# Patient Record
Sex: FEMALE | Race: WHITE | Employment: UNEMPLOYED | URBAN - METROPOLITAN AREA
[De-identification: names, ages, dates, MRNs, and addresses within clinical notes are randomized per-mention and may not be internally consistent; named-entity substitution may affect disease eponyms.]

---

## 2022-03-18 PROBLEM — Z98.84 STATUS POST GASTRIC BYPASS FOR OBESITY: Status: ACTIVE | Noted: 2020-11-10

## 2022-03-19 PROBLEM — I10 ESSENTIAL HYPERTENSION: Status: ACTIVE | Noted: 2018-03-23

## 2022-06-02 ENCOUNTER — APPOINTMENT (OUTPATIENT)
Dept: ULTRASOUND IMAGING | Age: 32
End: 2022-06-02
Payer: COMMERCIAL

## 2022-06-02 ENCOUNTER — HOSPITAL ENCOUNTER (OUTPATIENT)
Age: 32
Setting detail: OBSERVATION
Discharge: HOME | End: 2022-06-03
Attending: EMERGENCY MEDICINE
Payer: COMMERCIAL

## 2022-06-02 ENCOUNTER — HOSPITAL ENCOUNTER (EMERGENCY)
Dept: CT IMAGING | Age: 32
Discharge: HOME | End: 2022-06-05
Payer: COMMERCIAL

## 2022-06-02 ENCOUNTER — HOSPITAL ENCOUNTER (EMERGENCY)
Dept: GENERAL RADIOLOGY | Age: 32
Discharge: HOME | End: 2022-06-05
Payer: COMMERCIAL

## 2022-06-02 DIAGNOSIS — R79.89 ELEVATED LFTS: ICD-10-CM

## 2022-06-02 DIAGNOSIS — R60.0 BILATERAL LOWER EXTREMITY EDEMA: ICD-10-CM

## 2022-06-02 DIAGNOSIS — E88.09 HYPOALBUMINEMIA: ICD-10-CM

## 2022-06-02 DIAGNOSIS — D64.9 ANEMIA, UNSPECIFIED TYPE: Primary | ICD-10-CM

## 2022-06-02 PROBLEM — R74.8 ELEVATED LIVER ENZYMES: Status: ACTIVE | Noted: 2022-06-02

## 2022-06-02 PROBLEM — Z98.84 STATUS POST GASTRIC BYPASS FOR OBESITY: Status: ACTIVE | Noted: 2020-11-10

## 2022-06-02 PROBLEM — I10 ESSENTIAL HYPERTENSION: Status: ACTIVE | Noted: 2018-03-23

## 2022-06-02 LAB
ALBUMIN SERPL-MCNC: 1.8 G/DL (ref 3.5–5)
ALBUMIN/GLOB SERPL: 0.5 {RATIO} (ref 1.2–3.5)
ALP SERPL-CCNC: 187 U/L (ref 50–130)
ALT SERPL-CCNC: 17 U/L (ref 12–65)
ANION GAP SERPL CALC-SCNC: 7 MMOL/L (ref 7–16)
AST SERPL-CCNC: 51 U/L (ref 15–37)
BASOPHILS # BLD: 0 K/UL (ref 0–0.2)
BASOPHILS NFR BLD: 0 % (ref 0–2)
BILIRUB SERPL-MCNC: 2.3 MG/DL (ref 0.2–1.1)
BUN SERPL-MCNC: 6 MG/DL (ref 6–23)
CALCIUM SERPL-MCNC: 7.5 MG/DL (ref 8.3–10.4)
CHLORIDE SERPL-SCNC: 110 MMOL/L (ref 98–107)
CO2 SERPL-SCNC: 26 MMOL/L (ref 21–32)
CREAT SERPL-MCNC: 0.6 MG/DL (ref 0.6–1)
DIFFERENTIAL METHOD BLD: ABNORMAL
EKG ATRIAL RATE: 95 BPM
EKG DIAGNOSIS: NORMAL
EKG P AXIS: 56 DEGREES
EKG P-R INTERVAL: 150 MS
EKG Q-T INTERVAL: 390 MS
EKG QRS DURATION: 96 MS
EKG QTC CALCULATION (BAZETT): 490 MS
EKG R AXIS: 24 DEGREES
EKG T AXIS: 37 DEGREES
EKG VENTRICULAR RATE: 95 BPM
EOSINOPHIL # BLD: 0.1 K/UL (ref 0–0.8)
EOSINOPHIL NFR BLD: 1 % (ref 0.5–7.8)
ERYTHROCYTE [DISTWIDTH] IN BLOOD BY AUTOMATED COUNT: 25.5 % (ref 11.9–14.6)
GLOBULIN SER CALC-MCNC: 3.5 G/DL (ref 2.3–3.5)
GLUCOSE SERPL-MCNC: 87 MG/DL (ref 65–100)
HCG UR QL: NEGATIVE
HCT VFR BLD AUTO: 23.6 % (ref 35.8–46.3)
HGB BLD-MCNC: 7.1 G/DL (ref 11.7–15.4)
HGB RETIC QN AUTO: 31 PG (ref 29–35)
IMM GRANULOCYTES # BLD AUTO: 0 K/UL (ref 0–0.5)
IMM GRANULOCYTES NFR BLD AUTO: 1 % (ref 0–5)
IMM RETICS NFR: 30.4 % (ref 3–15.9)
LYMPHOCYTES # BLD: 2.6 K/UL (ref 0.5–4.6)
LYMPHOCYTES NFR BLD: 40 % (ref 13–44)
MCH RBC QN AUTO: 25.2 PG (ref 26.1–32.9)
MCHC RBC AUTO-ENTMCNC: 30.1 G/DL (ref 31.4–35)
MCV RBC AUTO: 83.7 FL (ref 79.6–97.8)
MONOCYTES # BLD: 0.5 K/UL (ref 0.1–1.3)
MONOCYTES NFR BLD: 8 % (ref 4–12)
NEUTS SEG # BLD: 3.3 K/UL (ref 1.7–8.2)
NEUTS SEG NFR BLD: 51 % (ref 43–78)
NRBC # BLD: 0 K/UL (ref 0–0.2)
NT PRO BNP: 93 PG/ML (ref 5–125)
PLATELET # BLD AUTO: 196 K/UL (ref 150–450)
PMV BLD AUTO: 12.2 FL (ref 9.4–12.3)
POTASSIUM SERPL-SCNC: 4.2 MMOL/L (ref 3.5–5.1)
PROT SERPL-MCNC: 5.3 G/DL (ref 6.3–8.2)
RBC # BLD AUTO: 2.82 M/UL (ref 4.05–5.2)
RETICS # AUTO: 0.1 M/UL (ref 0.03–0.1)
RETICS/RBC NFR AUTO: 3.7 % (ref 0.3–2)
SODIUM SERPL-SCNC: 143 MMOL/L (ref 136–145)
WBC # BLD AUTO: 6.6 K/UL (ref 4.3–11.1)

## 2022-06-02 PROCEDURE — 36415 COLL VENOUS BLD VENIPUNCTURE: CPT

## 2022-06-02 PROCEDURE — 82746 ASSAY OF FOLIC ACID SERUM: CPT

## 2022-06-02 PROCEDURE — 76700 US EXAM ABDOM COMPLETE: CPT

## 2022-06-02 PROCEDURE — 83540 ASSAY OF IRON: CPT

## 2022-06-02 PROCEDURE — 84300 ASSAY OF URINE SODIUM: CPT

## 2022-06-02 PROCEDURE — 80053 COMPREHEN METABOLIC PANEL: CPT

## 2022-06-02 PROCEDURE — 84156 ASSAY OF PROTEIN URINE: CPT

## 2022-06-02 PROCEDURE — G0378 HOSPITAL OBSERVATION PER HR: HCPCS

## 2022-06-02 PROCEDURE — 82607 VITAMIN B-12: CPT

## 2022-06-02 PROCEDURE — 80143 DRUG ASSAY ACETAMINOPHEN: CPT

## 2022-06-02 PROCEDURE — 96366 THER/PROPH/DIAG IV INF ADDON: CPT

## 2022-06-02 PROCEDURE — 80074 ACUTE HEPATITIS PANEL: CPT

## 2022-06-02 PROCEDURE — 85025 COMPLETE CBC W/AUTO DIFF WBC: CPT

## 2022-06-02 PROCEDURE — 83880 ASSAY OF NATRIURETIC PEPTIDE: CPT

## 2022-06-02 PROCEDURE — 96375 TX/PRO/DX INJ NEW DRUG ADDON: CPT

## 2022-06-02 PROCEDURE — 82570 ASSAY OF URINE CREATININE: CPT

## 2022-06-02 PROCEDURE — P9047 ALBUMIN (HUMAN), 25%, 50ML: HCPCS | Performed by: NURSE PRACTITIONER

## 2022-06-02 PROCEDURE — 70450 CT HEAD/BRAIN W/O DYE: CPT

## 2022-06-02 PROCEDURE — 6370000000 HC RX 637 (ALT 250 FOR IP): Performed by: FAMILY MEDICINE

## 2022-06-02 PROCEDURE — 82728 ASSAY OF FERRITIN: CPT

## 2022-06-02 PROCEDURE — 86923 COMPATIBILITY TEST ELECTRIC: CPT

## 2022-06-02 PROCEDURE — 86900 BLOOD TYPING SEROLOGIC ABO: CPT

## 2022-06-02 PROCEDURE — 93005 ELECTROCARDIOGRAM TRACING: CPT | Performed by: EMERGENCY MEDICINE

## 2022-06-02 PROCEDURE — 71045 X-RAY EXAM CHEST 1 VIEW: CPT

## 2022-06-02 PROCEDURE — 6360000002 HC RX W HCPCS: Performed by: NURSE PRACTITIONER

## 2022-06-02 PROCEDURE — 6370000000 HC RX 637 (ALT 250 FOR IP): Performed by: NURSE PRACTITIONER

## 2022-06-02 PROCEDURE — 85046 RETICYTE/HGB CONCENTRATE: CPT

## 2022-06-02 PROCEDURE — 99285 EMERGENCY DEPT VISIT HI MDM: CPT

## 2022-06-02 PROCEDURE — 94760 N-INVAS EAR/PLS OXIMETRY 1: CPT

## 2022-06-02 PROCEDURE — 96365 THER/PROPH/DIAG IV INF INIT: CPT

## 2022-06-02 PROCEDURE — 2580000003 HC RX 258: Performed by: NURSE PRACTITIONER

## 2022-06-02 PROCEDURE — 81025 URINE PREGNANCY TEST: CPT

## 2022-06-02 RX ORDER — POLYETHYLENE GLYCOL 3350 17 G/17G
17 POWDER, FOR SOLUTION ORAL DAILY PRN
Status: DISCONTINUED | OUTPATIENT
Start: 2022-06-02 | End: 2022-06-03 | Stop reason: HOSPADM

## 2022-06-02 RX ORDER — ONDANSETRON 2 MG/ML
4 INJECTION INTRAMUSCULAR; INTRAVENOUS EVERY 6 HOURS PRN
Status: DISCONTINUED | OUTPATIENT
Start: 2022-06-02 | End: 2022-06-03 | Stop reason: HOSPADM

## 2022-06-02 RX ORDER — ACETAMINOPHEN 650 MG/1
650 SUPPOSITORY RECTAL EVERY 6 HOURS PRN
Status: DISCONTINUED | OUTPATIENT
Start: 2022-06-02 | End: 2022-06-03 | Stop reason: HOSPADM

## 2022-06-02 RX ORDER — DULOXETIN HYDROCHLORIDE 30 MG/1
30 CAPSULE, DELAYED RELEASE ORAL DAILY
Status: DISCONTINUED | OUTPATIENT
Start: 2022-06-02 | End: 2022-06-03 | Stop reason: HOSPADM

## 2022-06-02 RX ORDER — LORAZEPAM 0.5 MG/1
0.5 TABLET ORAL ONCE
Status: COMPLETED | OUTPATIENT
Start: 2022-06-02 | End: 2022-06-02

## 2022-06-02 RX ORDER — SODIUM CHLORIDE 9 MG/ML
INJECTION, SOLUTION INTRAVENOUS PRN
Status: DISCONTINUED | OUTPATIENT
Start: 2022-06-02 | End: 2022-06-03 | Stop reason: HOSPADM

## 2022-06-02 RX ORDER — LAMOTRIGINE 25 MG/1
75 TABLET ORAL DAILY
Status: DISCONTINUED | OUTPATIENT
Start: 2022-06-02 | End: 2022-06-03 | Stop reason: HOSPADM

## 2022-06-02 RX ORDER — FAMOTIDINE 20 MG/1
20 TABLET, FILM COATED ORAL 2 TIMES DAILY
Status: DISCONTINUED | OUTPATIENT
Start: 2022-06-02 | End: 2022-06-03 | Stop reason: HOSPADM

## 2022-06-02 RX ORDER — ONDANSETRON 4 MG/1
4 TABLET, ORALLY DISINTEGRATING ORAL EVERY 8 HOURS PRN
Status: DISCONTINUED | OUTPATIENT
Start: 2022-06-02 | End: 2022-06-03 | Stop reason: HOSPADM

## 2022-06-02 RX ORDER — SODIUM CHLORIDE 0.9 % (FLUSH) 0.9 %
5-40 SYRINGE (ML) INJECTION EVERY 12 HOURS SCHEDULED
Status: DISCONTINUED | OUTPATIENT
Start: 2022-06-02 | End: 2022-06-03 | Stop reason: HOSPADM

## 2022-06-02 RX ORDER — 0.9 % SODIUM CHLORIDE 0.9 %
1000 INTRAVENOUS SOLUTION INTRAVENOUS ONCE
Status: DISCONTINUED | OUTPATIENT
Start: 2022-06-02 | End: 2022-06-03 | Stop reason: HOSPADM

## 2022-06-02 RX ORDER — ALBUMIN (HUMAN) 12.5 G/50ML
25 SOLUTION INTRAVENOUS EVERY 6 HOURS
Status: DISCONTINUED | OUTPATIENT
Start: 2022-06-02 | End: 2022-06-03 | Stop reason: HOSPADM

## 2022-06-02 RX ORDER — SODIUM CHLORIDE 0.9 % (FLUSH) 0.9 %
5-40 SYRINGE (ML) INJECTION PRN
Status: DISCONTINUED | OUTPATIENT
Start: 2022-06-02 | End: 2022-06-03 | Stop reason: HOSPADM

## 2022-06-02 RX ORDER — FUROSEMIDE 10 MG/ML
20 INJECTION INTRAMUSCULAR; INTRAVENOUS 2 TIMES DAILY
Status: COMPLETED | OUTPATIENT
Start: 2022-06-02 | End: 2022-06-03

## 2022-06-02 RX ORDER — ACETAMINOPHEN 325 MG/1
650 TABLET ORAL EVERY 6 HOURS PRN
Status: DISCONTINUED | OUTPATIENT
Start: 2022-06-02 | End: 2022-06-03 | Stop reason: HOSPADM

## 2022-06-02 RX ORDER — ENOXAPARIN SODIUM 100 MG/ML
40 INJECTION SUBCUTANEOUS DAILY
Status: DISCONTINUED | OUTPATIENT
Start: 2022-06-03 | End: 2022-06-03 | Stop reason: HOSPADM

## 2022-06-02 RX ADMIN — DULOXETINE HYDROCHLORIDE 30 MG: 30 CAPSULE, DELAYED RELEASE ORAL at 20:17

## 2022-06-02 RX ADMIN — FAMOTIDINE 20 MG: 20 TABLET, FILM COATED ORAL at 20:17

## 2022-06-02 RX ADMIN — ALBUMIN (HUMAN) 25 G: 0.25 INJECTION, SOLUTION INTRAVENOUS at 16:49

## 2022-06-02 RX ADMIN — SODIUM CHLORIDE, PRESERVATIVE FREE 10 ML: 5 INJECTION INTRAVENOUS at 20:18

## 2022-06-02 RX ADMIN — LORAZEPAM 0.5 MG: 0.5 TABLET ORAL at 20:18

## 2022-06-02 RX ADMIN — LAMOTRIGINE 75 MG: 25 TABLET ORAL at 20:17

## 2022-06-02 RX ADMIN — ALBUMIN (HUMAN) 25 G: 0.25 INJECTION, SOLUTION INTRAVENOUS at 23:12

## 2022-06-02 RX ADMIN — METOPROLOL TARTRATE 25 MG: 25 TABLET, FILM COATED ORAL at 20:18

## 2022-06-02 RX ADMIN — FUROSEMIDE 20 MG: 10 INJECTION, SOLUTION INTRAMUSCULAR; INTRAVENOUS at 17:42

## 2022-06-02 ASSESSMENT — ENCOUNTER SYMPTOMS
ABDOMINAL DISTENTION: 1
SHORTNESS OF BREATH: 1

## 2022-06-02 NOTE — CONSULTS
[facility] Consult Note       Referring Physician:  Jelly Lazcano    Consult Date:  6/2/2022    Admit Date:  6/2/2022    Chief Complaint:  Elevated liver enzymes    Subjective:     History of Present Illness:  Patient is a 36 y.o. female, new to [facility], with a remote Vanessa-en-Y bypass who is seen in consultation at the request of Naresh Ramon for elevated liver enzymes. She was admitted for BLE edema, fatigue, anemia, low albumin. She reports elevated liver enzymes 2 years ago that were blamed on acetaminophen overuse for tooth pain. She and her mom report that they do not know if enzymes returned to normal.    She endorses chronic, unchanged abdominal pain since age 17. She has 3 professionally done tattoos. She denies frequent NSAID/acetaminophen use, melena, rectal bleed, N/V, alcohol use, history of IVDA or nasal cocaine, blood transfusion. PMH:  Past Medical History:   Diagnosis Date    Anxiety     Bilateral leg edema 2020    if she doesn't eat enough protein    Hypertension 02/09/2021    Interstitial cystitis 2017       PSH:  Past Surgical History:   Procedure Laterality Date    CHOLECYSTECTOMY  2012    GASTRIC BYPASS SURGERY  2010    was 283 lbs       Allergies:  No Known Allergies    Home Medications:  Prior to Admission medications    Medication Sig Start Date End Date Taking? Authorizing Provider   DULoxetine (CYMBALTA) 30 MG extended release capsule Take 30 mg by mouth daily 3/1/22   Ar Automatic Reconciliation   HYDROcodone-acetaminophen (NORCO) 5-325 MG per tablet Take 1 tablet by mouth every 4 hours as needed.  12/4/21   Ar Automatic Reconciliation   ibuprofen (ADVIL;MOTRIN) 600 MG tablet Take 1 tablet by mouth every 6 hours as needed 12/1/21   Ar Automatic Reconciliation   lamoTRIgine (LAMICTAL) 25 MG tablet Take 75 mg by mouth daily 3/1/22   Ar Automatic Reconciliation   metoprolol tartrate (LOPRESSOR) 25 MG tablet Take 25 mg by mouth 2 times daily 11/3/21   Ar Automatic Reconciliation       [facility] Medications:  Current Facility-Administered Medications   Medication Dose Route Frequency    0.9 % sodium chloride bolus  1,000 mL IntraVENous Once    albumin human 25 % IV solution 25 g  25 g IntraVENous Q6H    furosemide (LASIX) injection 20 mg  20 mg IntraVENous BID    sodium chloride flush 0.9 % injection 5-40 mL  5-40 mL IntraVENous 2 times per day    sodium chloride flush 0.9 % injection 5-40 mL  5-40 mL IntraVENous PRN    0.9 % sodium chloride infusion   IntraVENous PRN    [START ON 6/3/2022] enoxaparin (LOVENOX) injection 40 mg  40 mg SubCUTAneous Daily    ondansetron (ZOFRAN-ODT) disintegrating tablet 4 mg  4 mg Oral Q8H PRN    Or    ondansetron (ZOFRAN) injection 4 mg  4 mg IntraVENous Q6H PRN    polyethylene glycol (GLYCOLAX) packet 17 g  17 g Oral Daily PRN    acetaminophen (TYLENOL) tablet 650 mg  650 mg Oral Q6H PRN    Or    acetaminophen (TYLENOL) suppository 650 mg  650 mg Rectal Q6H PRN    famotidine (PEPCID) tablet 20 mg  20 mg Oral BID       Social History:  Social History     Tobacco Use    Smoking status: Current Every Day Smoker     Packs/day: 0.50    Smokeless tobacco: Never Used    Tobacco comment: Quit smoking: started age 14   Substance Use Topics    Alcohol use: Not Currently       Family History:  Family History   Problem Relation Age of Onset    Other Father         doesn't have contact with    Ovarian Cancer Mother 39    Other Mother         fibromyalgia    Rheum Arthritis Mother        Review of Systems:  A detailed 10 system ROS is obtained, with pertinent positives as listed above. All others are negative. Diet:      Objective:     Physical Exam:  Vitals:  /88   Pulse 88   Temp 97.7 °F (36.5 °C) (Oral)   Resp 16   Ht 5' 6\" (1.676 m)   Wt 206 lb (93.4 kg)   SpO2 100%   BMI 33.25 kg/m²   Gen:  Pt is alert, cooperative, no acute distress  Skin:  Extremities and face reveal no rashes.  No palmar erythema. No telangiectasias on the chest wall. HEENT: Sclerae anicteric. Extra-occular muscles are intact. No oral ulcers. No abnormal pigmentation of the lips. The neck is supple. Nasal and lip piercings  Cardiovascular: Regular rate and rhythm. No murmurs, gallops, or rubs. Respiratory:  Comfortable breathing with no accessory muscle use. Clear breath sounds anteriorly with no wheezes, rales, or rhonchi. GI:  Abdomen nondistended, soft, and nontender. Normal active bowel sounds. No enlargement of the liver or spleen. No masses palpable. Rectal:  Deferred  Musculoskeletal:  No pitting edema of the lower legs. Extremities have good range of motion. No costovertebral tenderness. Neurological:  Gross memory appears intact. Patient is alert and oriented. Psychiatric:  Mood appears appropriate with judgement intact. Lymphatic:  No cervical or supraclavicular adenopathy. Laboratory:    Recent Labs     06/02/22  1219   WBC 6.6   HGB 7.1*   HCT 23.6*      MCV 83.7      K 4.2   *   CO2 26   BUN 6          Assessment:       Principal Problem:    Elevated LFTs  Active Problems:    Hypoalbuminemia    Anemia    Leg edema    Elevated liver enzymes    Status post gastric bypass for obesity    Essential hypertension  Resolved Problems:    * No resolved hospital problems. *     36 y.o. female, new to [facility], with a remote Vanessa-en-Y bypass who is seen in consultation at the request of Arcenio Dubose for elevated liver enzymes. She was admitted for BLE edema, fatigue, anemia, low albumin. Plan:       1. RUQ US  2. Hepatitis panel - pending  3. Iron labs, folate, B12  4. Outpatient follow up with bariatric nutrition  5. EGD/colon as outpatient if iron deficient  6.  Check prealbumin    Joe Akers MD  [facility], [place]

## 2022-06-02 NOTE — ED NOTES
Report called to [facility], RN on 3rd floor. #20 jelco via the left upper arm intact without swelling or redness.   Transported to [facility] via the wheelchair     Krunal Zapata RN  06/02/22 2936

## 2022-06-02 NOTE — ACP (ADVANCE CARE PLANNING)
[facility] Hospitalist Service  At the heart of better care     Advance Care Planning   Admit Date:  2022 12:17 PM   Name:  Leydi Mcfarlane   Age:  36 y.o. Sex:  female  :  1990   MRN:  [id number]   Room:  [address] [place] [address] is able to make her own decisions:   Yes    If pt unable to make decisions, POA/surrogate decision maker:  Kenny Orellana    Other members present in the meeting:   NA    Patient / surrogate decision-maker directed: Full code    Other ACP topics discussed, if applicable:   Blood transfusion    Patient or surrogate consented to discussion of the current conditions, workup, management plans, prognosis, and understand the risk for further deterioration. Time spent: 30 minutes in direct discussion (face to face and/or over phone).       Signed:  AMERICA Shearer CNP

## 2022-06-02 NOTE — ED TRIAGE NOTES
Pt states that she's had lower body swelling for the past several weeks. Pt states that today she had a syncopal episode and fell today. Pt hit her head on the edge of a counter when she fell. Pt denies blood thinners. Pt denies neck pain but endorses headache.

## 2022-06-02 NOTE — ED PROVIDER NOTES
[facility] Emergency Department Provider Note                     PCP:                Parker Hopkins DO               Age: 36 y.o. Sex: female           ICD-10-CM    1. Anemia, unspecified type  D64.9       2. Bilateral lower extremity edema  R60.0       3. Hypoalbuminemia  E88.09       4. Elevated LFTs  R79.89           EKG: NSR rate of 95. Normal axis. Nonspecific st changes. No stemi    MDM  Number of Diagnoses or Management Options  Anemia, unspecified type  Bilateral lower extremity edema  Elevated LFTs  Hypoalbuminemia  Diagnosis management comments: Patient's 74-year-old female with history of gastric bypass and cholecystectomy who presents with lower extremity edema and fatigue worsening over the past 2 to 3 weeks. Found to have an acute anemia with hemoglobin of 7.1. Hemoccult negative. Denies any bleeding. Does not have menstrual cycles. Also has hypoalbuminemia along with elevated liver enzymes. No longer has a gallbladder and has no abdominal pain. At this point in time hepatitis panel is pending. No evidence of heart failure with clear chest x-ray and normal BNP. Hospitalist team consulted for admission for further evaluation and management.     Risk of Complications, Morbidity, and/or Mortality  Presenting problems: moderate  Diagnostic procedures: moderate  Management options: moderate    Patient Progress  Patient progress: stable      Orders Placed This Encounter   Procedures    CT HEAD WO CONTRAST    XR CHEST PORTABLE    US ABDOMEN COMPLETE    CBC with Auto Differential    CMP    proBNP, N-TERMINAL    Hepatitis Panel, Acute    Sodium, urine, random    Protein / creatinine ratio, urine    Protein, urine, random    Transferrin Saturation    Ferritin    Folate    Vitamin B12    Reticulocytes    Acetaminophen Level    Prealbumin    Magnesium    Haptoglobin    Iron    Hemoglobin and Hematocrit    Vital signs per unit routine    Inpatient consult to GI    Inpatient consult to Dietitian    POC Pregnancy Urine Qual    POC Pregnancy Urine Qual    EKG 12 Lead    TYPE AND SCREEN    PREPARE RBC (CROSSMATCH), 1 Units    Saline lock IV    Place in Observation Service    Discharge patient        Ricardo Marie is a 36 y.o. female who presents to the Emergency Department with chief complaint of    Chief Complaint   Patient presents with    Fall    Loss of Consciousness    Groin Swelling      Patient is a 68-year-old female with history of gastric bypass surgery who presents with lower extremity edema. She says she had lower leg swelling about a year ago that resolved spontaneously after several days. She states this started a few weeks ago and has gotten to the point that her legs are very heavy and painful. Swelling has progressed to her lower abdomen. She states that she is urinating fine. Sometimes she gets short of breath going upstairs because her legs are so heavy. No chest pain or abdominal pain. No recent medication changes. History of heart failure liver renal failure. Denies possible pregnancy. Review of Systems   Constitutional:  Positive for fatigue. HENT: Negative. Respiratory:  Positive for shortness of breath. Cardiovascular:  Positive for leg swelling. Negative for chest pain and palpitations. Gastrointestinal:  Positive for abdominal distention. Genitourinary: Negative. All other systems reviewed and are negative. All other systems reviewed and are negative. [unfilled]     [unfilled]    @[email or web address]     Social Connections: Not on file        Allergies   Allergen Reactions    Lamictal [Lamotrigine] Rash        Vitals signs and nursing note reviewed. No data found. Physical Exam  Vitals and nursing note reviewed. Constitutional:       General: She is not in acute distress. Appearance: She is well-developed. She is obese. She is not ill-appearing or toxic-appearing. HENT:      Head: Normocephalic.    Eyes: Extraocular Movements: Extraocular movements intact. Comments: Conjunctival pallor   Cardiovascular:      Rate and Rhythm: Regular rhythm. Tachycardia present. Pulses: Normal pulses. Heart sounds: Normal heart sounds. Pulmonary:      Effort: Pulmonary effort is normal.      Breath sounds: Normal breath sounds. Genitourinary:     Rectum: Guaiac result negative. Musculoskeletal:         General: Swelling and tenderness present. Cervical back: Normal range of motion and neck supple. Right lower leg: Edema present. Left lower leg: Edema present. Comments: Bilateral lower extremity edema up to the pannus both legs are tight and tender. No erythema or warmth. 2+ pitting edema. Skin:     General: Skin is warm and dry. Findings: No rash. Neurological:      General: No focal deficit present. Mental Status: She is alert. Mental status is at baseline. Psychiatric:         Mood and Affect: Mood normal.         Behavior: Behavior normal.         Thought Content: Thought content normal.            Procedures    [unfilled]     US ABDOMEN COMPLETE   Final Result      1. Mild hepatosplenomegaly and probable hepatic steatosis no definite focal   liver mass identified. 2.  No biliary ductal dilatation status post cholecystectomy. CT HEAD WO CONTRAST   Final Result   No acute intracranial abnormality. XR CHEST PORTABLE   Final Result   Unremarkable portable chest radiograph. Voice dictation software was used during the making of this note. This software is not perfect and grammatical and other typographical errors may be present. This note has not been completely proofread for errors.        BENITO Haq  06/02/22 [facility] [address]  11/01/22 6786

## 2022-06-02 NOTE — H&P
Hospitalist History and Physical   Admit Date:  2022 12:17 PM   Name:  Nikkie Smith   Age:  36 y.o. Sex:  female  :  1990   MRN:  [id number]   Room:      Presenting Complaint: Fall, Loss of Consciousness, and Groin Swelling     Reason(s) for Admission: Elevated liver enzymes [R74.8]     History of Present Illness:   Nikkie Smith is a 36 y.o. female with medical history of gastric bypass, HTN who presented with  lower extremity edema and fatigue worsening over the past 2 to 3 weeks. Found to have an acute anemia with hemoglobin of 7.1. Hemoccult negative. Denies any bleeding. Does not have menstrual cycles. Also has hypoalbuminemia along with elevated liver enzymes. No longer has a gallbladder and has no abdominal pain. She denies any N/V/D, denies melena or hematemesis. She does state taking Exedrin and Tylenol \"probably more than I should\" for teeth pain. Review of Systems:  10 systems reviewed and negative except as noted in HPI. Assessment & Plan:     Principal Problem:    Elevated LFTs  Plan: liver U/S  Consult GI  Trend  Hepatitis panel pending  Tylenol level pending    Active Problems:    Hypoalbuminemia  Plan: Albumin IV Q6H  Trend  Likely absorption issue from gastric bypass      Anemia  Plan: GI consult  Anemia panel  Trend  Transfuse <7      Leg edema  Plan: as listed above  Check renal U/S and labs for concerns of nephrotic syndrome        Status post gastric bypass for obesity  Plan: noted  Nutrition consult      Essential hypertension  Plan: home meds resumed        Dispo/Discharge Plannin-2 days    Diet: ADULT DIET; Full Liquid  VTE ppx: Lovenox SQ  Code status: Full Code    Hospital Problems:  Principal Problem:    Elevated LFTs  Active Problems:    Hypoalbuminemia    Anemia    Leg edema    Elevated liver enzymes    Status post gastric bypass for obesity    Essential hypertension  Resolved Problems:    * No resolved hospital problems.  *       Past History:     Past Medical History:   Diagnosis Date    Anxiety     Bilateral leg edema 2020    if she doesn't eat enough protein    Hypertension 02/09/2021    Interstitial cystitis 2017     Past Surgical History:   Procedure Laterality Date    CHOLECYSTECTOMY  2012    GASTRIC BYPASS SURGERY  2010    was 283 lbs      No Known Allergies   Social History     Tobacco Use    Smoking status: Current Every Day Smoker     Packs/day: 0.50    Smokeless tobacco: Never Used    Tobacco comment: Quit smoking: started age 14   Substance Use Topics    Alcohol use: Not Currently      Family History   Problem Relation Age of Onset    Other Father         doesn't have contact with    Ovarian Cancer Mother 39    Other Mother         fibromyalgia    Rheum Arthritis Mother       Family history reviewed and negative except as noted above. There is no immunization history on file for this patient. Prior to Admit Medications:  Current Outpatient Medications   Medication Instructions    DULoxetine (CYMBALTA) 30 mg, Oral, DAILY    HYDROcodone-acetaminophen (NORCO) 5-325 MG per tablet 1 tablet, Oral, EVERY 4 HOURS PRN    ibuprofen (ADVIL;MOTRIN) 600 MG tablet 1 tablet, Oral, EVERY 6 HOURS PRN    lamoTRIgine (LAMICTAL) 75 mg, Oral, DAILY    metoprolol tartrate (LOPRESSOR) 25 mg, Oral, 2 TIMES DAILY         Objective:     Patient Vitals for the past 24 hrs:   Temp Pulse Resp BP SpO2   06/02/22 1515 -- (!) 133 16 (!) 144/90 93 %   06/02/22 1154 98.2 °F (36.8 °C) (!) 114 18 107/68 100 %       Oxygen Therapy  SpO2: 93 %    Estimated body mass index is 33.25 kg/m² as calculated from the following:    Height as of this encounter: 5' 6\" (1.676 m). Weight as of this encounter: 206 lb (93.4 kg). No intake or output data in the 24 hours ending 06/02/22 1526      Physical Exam:  Blood pressure (!) 144/90, pulse (!) 133, temperature 98.2 °F (36.8 °C), resp.  rate 16, height 5' 6\" (1.676 m), weight 206 lb (93.4 kg), SpO2 93 %.  General:    Well nourished. No overt distress, BMI 33.25  Head:  Normocephalic, atraumatic  Eyes:  Sclerae appear normal.  Pupils equally round. ENT:  Nares appear normal, no drainage. Moist oral mucosa  Neck:  No restricted ROM. Trachea midline   CV:   RRR. No m/r/g. No jugular venous distension. Lungs:   CTAB. No wheezing, rhonchi, or rales. Respirations even, unlabored  Abdomen: Bowel sounds present. Soft, nontender, nondistended. Extremities: No cyanosis or clubbing. 2+ edema LE bilat. Skin:     No rashes; pale coloration. Warm and dry. Neuro:  CN II-XII grossly intact. Sensation intact. A&Ox3  Psych:  Normal mood and affect.       I have reviewed ordered lab tests and independently visualized imaging below:    Last 24hr Labs:  Recent Results (from the past 24 hour(s))   EKG 12 Lead    Collection Time: 06/02/22 12:14 PM   Result Value Ref Range    Ventricular Rate 95 BPM    Atrial Rate 95 BPM    P-R Interval 150 ms    QRS Duration 96 ms    Q-T Interval 390 ms    QTc Calculation (Bazett) 490 ms    P Axis 56 degrees    R Axis 24 degrees    T Axis 37 degrees    Diagnosis Normal sinus rhythm    CBC with Auto Differential    Collection Time: 06/02/22 12:19 PM   Result Value Ref Range    WBC 6.6 4.3 - 11.1 K/uL    RBC 2.82 (L) 4.05 - 5.2 M/uL    Hemoglobin 7.1 (L) 11.7 - 15.4 g/dL    Hematocrit 23.6 (L) 35.8 - 46.3 %    MCV 83.7 79.6 - 97.8 FL    MCH 25.2 (L) 26.1 - 32.9 PG    MCHC 30.1 (L) 31.4 - 35.0 g/dL    RDW 25.5 (H) 11.9 - 14.6 %    Platelets 063 547 - 805 K/uL    MPV 12.2 9.4 - 12.3 FL    nRBC 0.00 0.0 - 0.2 K/uL    Differential Type AUTOMATED      Seg Neutrophils 51 43 - 78 %    Lymphocytes 40 13 - 44 %    Monocytes 8 4.0 - 12.0 %    Eosinophils % 1 0.5 - 7.8 %    Basophils 0 0.0 - 2.0 %    Immature Granulocytes 1 0.0 - 5.0 %    Segs Absolute 3.3 1.7 - 8.2 K/UL    Absolute Lymph # 2.6 0.5 - 4.6 K/UL    Absolute Mono # 0.5 0.1 - 1.3 K/UL    Absolute Eos # 0.1 0.0 - 0.8 K/UL    Basophils Absolute 0.0 0.0 - 0.2 K/UL    Absolute Immature Granulocyte 0.0 0.0 - 0.5 K/UL   CMP    Collection Time: 06/02/22 12:19 PM   Result Value Ref Range    Sodium 143 136 - 145 mmol/L    Potassium 4.2 3.5 - 5.1 mmol/L    Chloride 110 (H) 98 - 107 mmol/L    CO2 26 21 - 32 mmol/L    Anion Gap 7 7 - 16 mmol/L    Glucose 87 65 - 100 mg/dL    BUN 6 6 - 23 MG/DL    CREATININE 0.60 0.6 - 1.0 MG/DL    GFR African American >60 >60 ml/min/1.73m2    GFR Non- >60 >60 ml/min/1.73m2    Calcium 7.5 (L) 8.3 - 10.4 MG/DL    Total Bilirubin 2.3 (H) 0.2 - 1.1 MG/DL    ALT 17 12 - 65 U/L    AST 51 (H) 15 - 37 U/L    Alk Phosphatase 187 (H) 50 - 130 U/L    Total Protein 5.3 (L) 6.3 - 8.2 g/dL    Albumin 1.8 (L) 3.5 - 5.0 g/dL    Globulin 3.5 2.3 - 3.5 g/dL    Albumin/Globulin Ratio 0.5 (L) 1.2 - 3.5     proBNP, N-TERMINAL    Collection Time: 06/02/22 12:19 PM   Result Value Ref Range    NT Pro-BNP 93 5 - 125 PG/ML   TYPE AND SCREEN    Collection Time: 06/02/22  1:03 PM   Result Value Ref Range    Crossmatch expiration date 06/05/2022,2359     ABO/Rh AB POSITIVE     Antibody Screen NEG    POC Pregnancy Urine Qual    Collection Time: 06/02/22  2:12 PM   Result Value Ref Range    Preg Test, Ur Negative NEG         Other Studies:  CT HEAD WO CONTRAST    Result Date: 6/2/2022  EXAM: CT HEAD WITHOUT CONTRAST INDICATION: fall. COMPARISON: None. TECHNIQUE: Contiguous axial images were obtained from the skull base through the vertex without IV contrast. Radiation dose reduction techniques were used for this study. Our CT scanners use one or all of the following: Automated exposure control, adjustment of the mA and/or kV according to patient size, iterative reconstruction. FINDINGS: Normal appearance of the brain parenchyma without evidence of acute infarction, hemorrhage, or mass lesion. No hydrocephalus or midline shift. No extra-axial mass or hemorrhage. The basal cisterns are patent.  The visualized portions of the orbits are normal. The mastoid air cells and paranasal sinuses are patent. The visualized vascular structures have an unremarkable noncontrast appearance. The calvarium and soft tissues appear normal.     No acute intracranial abnormality. XR CHEST PORTABLE    Result Date: 6/2/2022  Portable chest: History: Dyspnea Comparison: None Findings: A single view of the chest was obtained at 12:36 PM. The cardiac silhouette is normal in size and configuration. The lungs and pleural spaces are clear. The pulmonary vascularity is within normal limits. Unremarkable portable chest radiograph. Echocardiogram:  No results found for this or any previous visit. Meds previously ordered:  Orders Placed This Encounter   Medications    0.9 % sodium chloride bolus    albumin human 25 % IV solution 25 g    furosemide (LASIX) injection 20 mg    sodium chloride flush 0.9 % injection 5-40 mL    sodium chloride flush 0.9 % injection 5-40 mL    0.9 % sodium chloride infusion    enoxaparin (LOVENOX) injection 40 mg     Order Specific Question:   Indication of Use     Answer:   Prophylaxis-DVT/PE    OR Linked Order Group     ondansetron (ZOFRAN-ODT) disintegrating tablet 4 mg     ondansetron (ZOFRAN) injection 4 mg    polyethylene glycol (GLYCOLAX) packet 17 g    OR Linked Order Group     acetaminophen (TYLENOL) tablet 650 mg     acetaminophen (TYLENOL) suppository 650 mg    famotidine (PEPCID) tablet 20 mg       Blood Product Consent:  I have discussed with the patient the rationale for blood component transfusion; its benefits in treating or preventing fatigue, organ damage, or death; and its risk which includes mild transfusion reactions, rare risk of blood borne infection, or more serious but rare reactions. I have discussed the alternatives to transfusion, including the risk and consequences of not receiving transfusion.  The patient had an opportunity to ask questions and had agreed to proceed with transfusion of blood components.     Signed:  AMERICA Richardson - CNP

## 2022-06-03 VITALS
DIASTOLIC BLOOD PRESSURE: 79 MMHG | BODY MASS INDEX: 33.11 KG/M2 | TEMPERATURE: 98.4 F | HEART RATE: 82 BPM | WEIGHT: 206 LBS | SYSTOLIC BLOOD PRESSURE: 123 MMHG | RESPIRATION RATE: 17 BRPM | HEIGHT: 66 IN | OXYGEN SATURATION: 100 %

## 2022-06-03 PROBLEM — E87.6 ACUTE HYPOKALEMIA: Status: ACTIVE | Noted: 2020-01-01

## 2022-06-03 PROBLEM — R17 ELEVATED BILIRUBIN: Status: ACTIVE | Noted: 2022-06-02

## 2022-06-03 LAB
ALBUMIN SERPL-MCNC: 2 G/DL (ref 3.5–5)
ALBUMIN/GLOB SERPL: 0.7 {RATIO} (ref 1.2–3.5)
ALP SERPL-CCNC: 158 U/L (ref 50–130)
ALT SERPL-CCNC: 12 U/L (ref 12–65)
ANION GAP SERPL CALC-SCNC: 11 MMOL/L (ref 7–16)
APAP SERPL-MCNC: 4 UG/ML (ref 10–30)
AST SERPL-CCNC: 17 U/L (ref 15–37)
BASOPHILS # BLD: 0 K/UL (ref 0–0.2)
BASOPHILS NFR BLD: 0 % (ref 0–2)
BILIRUB SERPL-MCNC: 2 MG/DL (ref 0.2–1.1)
BUN SERPL-MCNC: 5 MG/DL (ref 6–23)
CALCIUM SERPL-MCNC: 7.5 MG/DL (ref 8.3–10.4)
CHLORIDE SERPL-SCNC: 108 MMOL/L (ref 98–107)
CO2 SERPL-SCNC: 27 MMOL/L (ref 21–32)
CREAT SERPL-MCNC: 0.5 MG/DL (ref 0.6–1)
CREAT UR-MCNC: 73 MG/DL
DIFFERENTIAL METHOD BLD: ABNORMAL
EOSINOPHIL # BLD: 0.1 K/UL (ref 0–0.8)
EOSINOPHIL NFR BLD: 1 % (ref 0.5–7.8)
ERYTHROCYTE [DISTWIDTH] IN BLOOD BY AUTOMATED COUNT: 26 % (ref 11.9–14.6)
FERRITIN SERPL-MCNC: 12 NG/ML (ref 8–388)
FOLATE SERPL-MCNC: 12.8 NG/ML (ref 3.1–17.5)
GLOBULIN SER CALC-MCNC: 2.9 G/DL (ref 2.3–3.5)
GLUCOSE SERPL-MCNC: 79 MG/DL (ref 65–100)
HAPTOGLOB SERPL-MCNC: <8 MG/DL (ref 30–200)
HAV IGM SER QL: NONREACTIVE
HBV CORE IGM SER QL: NONREACTIVE
HBV SURFACE AG SER QL: NONREACTIVE
HCT VFR BLD AUTO: 21.1 % (ref 35.8–46.3)
HCT VFR BLD AUTO: 24.8 % (ref 35.8–46.3)
HCV AB SER QL: NONREACTIVE
HGB BLD-MCNC: 6.3 G/DL (ref 11.7–15.4)
HGB BLD-MCNC: 7.5 G/DL (ref 11.7–15.4)
HISTORY CHECK: NORMAL
IMM GRANULOCYTES # BLD AUTO: 0.1 K/UL (ref 0–0.5)
IMM GRANULOCYTES NFR BLD AUTO: 1 % (ref 0–5)
IRON SATN MFR SERPL: 15 %
IRON SERPL-MCNC: 24 UG/DL (ref 35–150)
IRON SERPL-MCNC: 41 UG/DL (ref 35–150)
LYMPHOCYTES # BLD: 2.6 K/UL (ref 0.5–4.6)
LYMPHOCYTES NFR BLD: 42 % (ref 13–44)
MAGNESIUM SERPL-MCNC: 1.9 MG/DL (ref 1.8–2.4)
MCH RBC QN AUTO: 25.4 PG (ref 26.1–32.9)
MCHC RBC AUTO-ENTMCNC: 29.9 G/DL (ref 31.4–35)
MCV RBC AUTO: 85.1 FL (ref 79.6–97.8)
MONOCYTES # BLD: 0.6 K/UL (ref 0.1–1.3)
MONOCYTES NFR BLD: 9 % (ref 4–12)
NEUTS SEG # BLD: 2.8 K/UL (ref 1.7–8.2)
NEUTS SEG NFR BLD: 47 % (ref 43–78)
NRBC # BLD: 0 K/UL (ref 0–0.2)
PLATELET # BLD AUTO: 184 K/UL (ref 150–450)
PLATELET COMMENT: ADEQUATE
PMV BLD AUTO: 11.5 FL (ref 9.4–12.3)
POTASSIUM SERPL-SCNC: 2.8 MMOL/L (ref 3.5–5.1)
PREALB SERPL-MCNC: 3.3 MG/DL (ref 18–35.7)
PROT SERPL-MCNC: 4.9 G/DL (ref 6.3–8.2)
PROT UR-MCNC: 13 MG/DL
PROT UR-MCNC: 15 MG/DL
PROT/CREAT UR-RTO: 0.2
RBC # BLD AUTO: 2.48 M/UL (ref 4.05–5.2)
RBC MORPH BLD: ABNORMAL
SODIUM SERPL-SCNC: 146 MMOL/L (ref 136–145)
SODIUM UR-SCNC: 72 MMOL/L
TIBC SERPL-MCNC: 274 UG/DL (ref 250–450)
VIT B12 SERPL-MCNC: 891 PG/ML (ref 193–986)
WBC # BLD AUTO: 6.2 K/UL (ref 4.3–11.1)
WBC MORPH BLD: ABNORMAL

## 2022-06-03 PROCEDURE — P9047 ALBUMIN (HUMAN), 25%, 50ML: HCPCS | Performed by: NURSE PRACTITIONER

## 2022-06-03 PROCEDURE — 84134 ASSAY OF PREALBUMIN: CPT

## 2022-06-03 PROCEDURE — P9016 RBC LEUKOCYTES REDUCED: HCPCS

## 2022-06-03 PROCEDURE — 83010 ASSAY OF HAPTOGLOBIN QUANT: CPT

## 2022-06-03 PROCEDURE — 36430 TRANSFUSION BLD/BLD COMPNT: CPT

## 2022-06-03 PROCEDURE — 36415 COLL VENOUS BLD VENIPUNCTURE: CPT

## 2022-06-03 PROCEDURE — 83540 ASSAY OF IRON: CPT

## 2022-06-03 PROCEDURE — 85025 COMPLETE CBC W/AUTO DIFF WBC: CPT

## 2022-06-03 PROCEDURE — 6370000000 HC RX 637 (ALT 250 FOR IP): Performed by: INTERNAL MEDICINE

## 2022-06-03 PROCEDURE — G0378 HOSPITAL OBSERVATION PER HR: HCPCS

## 2022-06-03 PROCEDURE — 6360000002 HC RX W HCPCS: Performed by: NURSE PRACTITIONER

## 2022-06-03 PROCEDURE — 96366 THER/PROPH/DIAG IV INF ADDON: CPT

## 2022-06-03 PROCEDURE — 2580000003 HC RX 258: Performed by: NURSE PRACTITIONER

## 2022-06-03 PROCEDURE — 6370000000 HC RX 637 (ALT 250 FOR IP): Performed by: NURSE PRACTITIONER

## 2022-06-03 PROCEDURE — 85018 HEMOGLOBIN: CPT

## 2022-06-03 PROCEDURE — 96376 TX/PRO/DX INJ SAME DRUG ADON: CPT

## 2022-06-03 PROCEDURE — 83735 ASSAY OF MAGNESIUM: CPT

## 2022-06-03 PROCEDURE — 80053 COMPREHEN METABOLIC PANEL: CPT

## 2022-06-03 RX ORDER — POTASSIUM BICARBONATE 25 MEQ/1
50 TABLET, EFFERVESCENT ORAL 2 TIMES DAILY
Qty: 120 TABLET | Refills: 0 | Status: SHIPPED | OUTPATIENT
Start: 2022-06-03 | End: 2022-06-22 | Stop reason: ALTCHOICE

## 2022-06-03 RX ORDER — POTASSIUM CHLORIDE 20 MEQ/1
40 TABLET, EXTENDED RELEASE ORAL
Status: DISCONTINUED | OUTPATIENT
Start: 2022-06-03 | End: 2022-06-03

## 2022-06-03 RX ORDER — SODIUM CHLORIDE 9 MG/ML
INJECTION, SOLUTION INTRAVENOUS PRN
Status: DISCONTINUED | OUTPATIENT
Start: 2022-06-03 | End: 2022-06-03 | Stop reason: HOSPADM

## 2022-06-03 RX ADMIN — POTASSIUM BICARBONATE 40 MEQ: 782 TABLET, EFFERVESCENT ORAL at 16:45

## 2022-06-03 RX ADMIN — FAMOTIDINE 20 MG: 20 TABLET, FILM COATED ORAL at 10:09

## 2022-06-03 RX ADMIN — DULOXETINE HYDROCHLORIDE 30 MG: 30 CAPSULE, DELAYED RELEASE ORAL at 10:09

## 2022-06-03 RX ADMIN — ALBUMIN (HUMAN) 25 G: 0.25 INJECTION, SOLUTION INTRAVENOUS at 11:01

## 2022-06-03 RX ADMIN — ALBUMIN (HUMAN) 25 G: 0.25 INJECTION, SOLUTION INTRAVENOUS at 16:23

## 2022-06-03 RX ADMIN — ALBUMIN (HUMAN) 25 G: 0.25 INJECTION, SOLUTION INTRAVENOUS at 05:37

## 2022-06-03 RX ADMIN — SODIUM CHLORIDE, PRESERVATIVE FREE 10 ML: 5 INJECTION INTRAVENOUS at 10:13

## 2022-06-03 RX ADMIN — POTASSIUM CHLORIDE 40 MEQ: 20 TABLET, EXTENDED RELEASE ORAL at 12:16

## 2022-06-03 RX ADMIN — FUROSEMIDE 20 MG: 10 INJECTION, SOLUTION INTRAMUSCULAR; INTRAVENOUS at 10:58

## 2022-06-03 RX ADMIN — METOPROLOL TARTRATE 25 MG: 25 TABLET, FILM COATED ORAL at 10:10

## 2022-06-03 RX ADMIN — LAMOTRIGINE 75 MG: 25 TABLET ORAL at 10:09

## 2022-06-03 NOTE — PLAN OF CARE
Problem: Discharge Planning  Goal: Discharge to home or other facility with appropriate resources  Outcome: Progressing  Flowsheets (Taken 6/3/2022 5630)  Discharge to home or other facility with appropriate resources: Identify barriers to discharge with patient and caregiver     Problem: Safety - Adult  Goal: Free from fall injury  Outcome: Progressing     Problem: ABCDS Injury Assessment  Goal: Absence of physical injury  Outcome: Progressing

## 2022-06-03 NOTE — PLAN OF CARE
Problem: Discharge Planning  Goal: Discharge to home or other facility with appropriate resources  6/3/2022 1846 by Ada Valentino RN  Outcome: Completed  6/3/2022 1046 by Ada Valentino RN  Outcome: Progressing  Flowsheets (Taken 6/3/2022 0930)  Discharge to home or other facility with appropriate resources: Identify barriers to discharge with patient and caregiver     Problem: Safety - Adult  Goal: Free from fall injury  6/3/2022 1846 by Ada Valentino RN  Outcome: Completed  6/3/2022 1046 by Ada Valentino RN  Outcome: Progressing     Problem: ABCDS Injury Assessment  Goal: Absence of physical injury  6/3/2022 1846 by Ada Valentino RN  Outcome: Completed  6/3/2022 1046 by Ada Valentino RN  Outcome: Progressing

## 2022-06-03 NOTE — PROGRESS NOTES
[facility] Progress Note         Admit Date:  6/2/2022    Today's Date:  6/3/2022    CC: Elevated liver enzymes     Subjective:     Patient denies abdominal pain, nausea, or vomiting. She wants to eat. Medications:   Current Facility-Administered Medications   Medication Dose Route Frequency    0.9 % sodium chloride infusion   IntraVENous PRN    potassium chloride (KLOR-CON M) extended release tablet 40 mEq  40 mEq Oral TID WC    0.9 % sodium chloride bolus  1,000 mL IntraVENous Once    albumin human 25 % IV solution 25 g  25 g IntraVENous Q6H    sodium chloride flush 0.9 % injection 5-40 mL  5-40 mL IntraVENous 2 times per day    sodium chloride flush 0.9 % injection 5-40 mL  5-40 mL IntraVENous PRN    0.9 % sodium chloride infusion   IntraVENous PRN    enoxaparin (LOVENOX) injection 40 mg  40 mg SubCUTAneous Daily    ondansetron (ZOFRAN-ODT) disintegrating tablet 4 mg  4 mg Oral Q8H PRN    Or    ondansetron (ZOFRAN) injection 4 mg  4 mg IntraVENous Q6H PRN    polyethylene glycol (GLYCOLAX) packet 17 g  17 g Oral Daily PRN    acetaminophen (TYLENOL) tablet 650 mg  650 mg Oral Q6H PRN    Or    acetaminophen (TYLENOL) suppository 650 mg  650 mg Rectal Q6H PRN    famotidine (PEPCID) tablet 20 mg  20 mg Oral BID    DULoxetine (CYMBALTA) extended release capsule 30 mg  30 mg Oral Daily    lamoTRIgine (LAMICTAL) tablet 75 mg  75 mg Oral Daily    metoprolol tartrate (LOPRESSOR) tablet 25 mg  25 mg Oral BID       Review of Systems:  ROS was obtained, with pertinent positives as listed above. No chest pain or SOB.     Diet:  Full liquid     Objective:   Vitals:  BP (!) 115/95   Pulse 73   Temp 98.4 °F (36.9 °C) (Oral)   Resp 17   Ht 5' 6\" (1.676 m)   Wt 206 lb (93.4 kg)   SpO2 100%   BMI 33.25 kg/m²   Intake/Output:  06/03 0701 - 06/03 1900  In: 567.5 [P.O.:240]  Out: 0   06/01 1901 - 06/03 0700  In: 205 [I.V.:205]  Out: -   Exam:  General appearance: alert, cooperative, no distress, lying in bed  Lungs: clear to auscultation bilaterally anteriorly  Heart: regular rate and rhythm  Abdomen: soft, mild TTP to lower abdomen. Bowel sounds normal. No masses, no organomegaly  Extremities: extremities normal, atraumatic, no cyanosis or edema  Neuro:  alert and oriented    Data Review (Labs):    Recent Labs     06/02/22  1219 06/03/22  0439   WBC 6.6 6.2   HGB 7.1* 6.3*   HCT 23.6* 21.1*    184   MCV 83.7 85.1    146*   K 4.2 2.8*   * 108*   CO2 26 27   BUN 6 5*   MG  --  1.9   AST 51* 17   ALT 17 12     6/2/22  Hep A IgM NR   NONREACTIVE    Hep B Core Ab, IgM NR   NONREACTIVE    Hepatitis B Surface Ag NR   NONREACTIVE    Hepatitis C Ab NR   NONREACTIVE      COMPLETE ABDOMINAL SONOGRAPHY, June 2, 2022:       CLINICAL HISTORY: Abnormal liver function tests and anemia.       FINDINGS: Multiple images from real time ultrasound evaluation of the abdomen   show that the gallbladder is surgically absent.  The common bile duct is normal   in caliber at 5 mm, and no significant intrahepatic bilary ductal dilatation is   evident.  Diffuse hepatic parenchymal hyperechogenicity with sonic attenuation   is most commonly associated with fatty infiltration.  The liver is mildly   enlarged at 21 cm.  Diffuse hepatic parenchymal hyperechogenicity with sonic   attenuation is most commonly associated with fatty infiltration. No focal liver   lesion is seen.  The spleen is mildly enlarged at 14.2 cm.  The pancreas,   kidneys, aorta, and IVC appear unremarkable as imaged.           Impression       1.  Mild hepatosplenomegaly and probable hepatic steatosis no definite focal   liver mass identified.    2.  No biliary ductal dilatation status post cholecystectomy.         EXAM: CT HEAD WITHOUT CONTRAST 6/2/22       INDICATION: fall.       COMPARISON: None.        TECHNIQUE: Contiguous axial images were obtained from the skull base through the   vertex without IV contrast. Radiation dose reduction techniques were used for   this study. Our CT scanners use one or all of the following: Automated exposure   control, adjustment of the mA and/or kV according to patient size, iterative   reconstruction.       FINDINGS:    Normal appearance of the brain parenchyma without evidence of acute infarction,   hemorrhage, or mass lesion. No hydrocephalus or midline shift. No extra-axial   mass or hemorrhage. The basal cisterns are patent.        The visualized portions of the orbits are normal. The mastoid air cells and   paranasal sinuses are patent.       The visualized vascular structures have an unremarkable noncontrast appearance.       The calvarium and soft tissues appear normal.           Impression   No acute intracranial abnormality. Portable chest: 6/2/2       History: Dyspnea       Comparison: None       Findings: A single view of the chest was obtained at 12:36 PM.       The cardiac silhouette is normal in size and configuration. The lungs and   pleural spaces are clear. The pulmonary vascularity is within normal limits.           Impression   Unremarkable portable chest radiograph. Assessment:     Principal Problem:    Elevated bilirubin  Active Problems:    Hypoalbuminemia    Normocytic anemia    Bilateral leg edema    Acute hypokalemia    Status post gastric bypass for obesity    Essential hypertension  Resolved Problems:    * No resolved hospital problems. *    36 y.o. female, new to [facility], with a remote Vanessa-en-Y bypass who is seen in consultation at the request of Diaz Root for elevated liver enzymes. She was admitted for BLE edema, fatigue, anemia, low albumin. Abdominal US 6/2/22  1.  Mild hepatosplenomegaly and probable hepatic steatosis no definite focal   liver mass identified. 2.  No biliary ductal dilatation status post cholecystectomy. LFTs trending down, , ALT 12 (normal), AST 17 (normal), bilirubin 2.0. Acute Hepatitis panel non reactive.

## 2022-06-03 NOTE — CARE COORDINATION
Chart screened by  for discharge planning. Patient is ambulatory, independent, insured, and established with primary care. No needs identified at this time. Please consult  if any new issues arise.     Nadia Montero LMSW    [address] [facility]

## 2022-06-03 NOTE — PROGRESS NOTES
Pt is A&Ox4. Discharged at this time. H/H re-check >7.0. PIVx1 removed w tip intact. AVS printed and reviewed w patient. Paper scrips for K+ given to patient.

## 2022-06-03 NOTE — CARE COORDINATION
06/03/22 1430   Service Assessment   PCP Verified by CM Yes   Discharge Planning   Patient expects to be discharged to: [facility]   Services At/After Discharge None     Carmell Romberg, LMSW    [address] [facility]

## 2022-06-03 NOTE — DISCHARGE SUMMARY
Hospitalist Discharge Summary   Admit Date:  2022 12:17 PM   DC Note date: 6/3/2022  Name:  Shaquille Evans   Age:  36 y.o. Sex:  female  :  1990   MRN:  [id number]   Room:  [facility]  PCP:  Ranjeet Parker DO    Presenting Complaint: Fall, Loss of Consciousness, and Groin Swelling     Initial Admission Diagnosis: Hypoalbuminemia [E88.09]  Elevated LFTs [R79.89]  Elevated liver enzymes [R74.8]  Bilateral lower extremity edema [R60.0]  Anemia, unspecified type [D64.9]     Problem List for this Hospitalization (present on admission):    Principal Problem:    Elevated bilirubin  Active Problems:    Hypoalbuminemia    Normocytic anemia    Bilateral leg edema    Acute hypokalemia    Status post gastric bypass for obesity    Essential hypertension  Resolved Problems:    * No resolved hospital problems. *    Did Patient have Sepsis (YES OR NO): NO    Hospital Course:  36 y.o. female with medical history of gastric bypass, HTN who presented with  lower extremity edema and fatigue worsening over the past 2 to 3 weeks.  Found to have an acute anemia with hemoglobin of 7.1. Hemoccult negative.  Denies any bleeding.  Does not have menstrual cycles.  Also has hypoalbuminemia along with elevated liver enzymes.  No longer has a gallbladder and has no abdominal pain. She denies any N/V/D, denies melena or hematemesis. She does state taking Exedrin and Tylenol \"probably more than I should\" for teeth pain. Hgb dropped to 6.3 and she got 1U PRBC. Hgb came up to 7.5. Her liver enzymes improved. RUQ U/S showed fatty liver. Her potassium dropped to 2.8 so it was replaced. She remained stable and was discharged home with oral potassium supplementation. Disposition: HOME  Diet: ADULT DIET;  Regular  Code Status: Full Code    Follow Ups:  PCP in 3-5 days    Follow up labs/diagnostics (ultimately defer to outpatient provider):  CMP early next week by PCP    Time spent in patient discharge and coordination 33 minutes. Plan was discussed with family member, nursing, and case management. All questions answered. Patient was stable at time of discharge. Instructions given to call a physician or return if any concerns. Current Discharge Medication List      START taking these medications    Details   potassium bicarbonate (EFFER-K) 25 MEQ disintegrating tablet Take 2 tablets by mouth 2 times daily  Qty: 120 tablet, Refills: 0         CONTINUE these medications which have NOT CHANGED    Details   DULoxetine (CYMBALTA) 30 MG extended release capsule Take 30 mg by mouth daily      HYDROcodone-acetaminophen (NORCO) 5-325 MG per tablet Take 1 tablet by mouth every 4 hours as needed. ibuprofen (ADVIL;MOTRIN) 600 MG tablet Take 1 tablet by mouth every 6 hours as needed      lamoTRIgine (LAMICTAL) 25 MG tablet Take 75 mg by mouth daily      metoprolol tartrate (LOPRESSOR) 25 MG tablet Take 25 mg by mouth 2 times daily             Procedures done this admission:  * No surgery found *    Consults this admission:  IP CONSULT TO GI  IP CONSULT TO DIETITIAN    Echocardiogram results:  No results found for this or any previous visit. Diagnostic Imaging/Tests:   CT HEAD WO CONTRAST    Result Date: 6/2/2022  EXAM: CT HEAD WITHOUT CONTRAST INDICATION: fall. COMPARISON: None. TECHNIQUE: Contiguous axial images were obtained from the skull base through the vertex without IV contrast. Radiation dose reduction techniques were used for this study. Our CT scanners use one or all of the following: Automated exposure control, adjustment of the mA and/or kV according to patient size, iterative reconstruction. FINDINGS: Normal appearance of the brain parenchyma without evidence of acute infarction, hemorrhage, or mass lesion. No hydrocephalus or midline shift. No extra-axial mass or hemorrhage. The basal cisterns are patent. The visualized portions of the orbits are normal. The mastoid air cells and paranasal sinuses are patent.  The visualized vascular structures have an unremarkable noncontrast appearance. The calvarium and soft tissues appear normal.     No acute intracranial abnormality. US ABDOMEN COMPLETE    Result Date: 6/2/2022  COMPLETE ABDOMINAL SONOGRAPHY, June 2, 2022: CLINICAL HISTORY: Abnormal liver function tests and anemia. FINDINGS: Multiple images from real time ultrasound evaluation of the abdomen show that the gallbladder is surgically absent. The common bile duct is normal in caliber at 5 mm, and no significant intrahepatic bilary ductal dilatation is evident. Diffuse hepatic parenchymal hyperechogenicity with sonic attenuation is most commonly associated with fatty infiltration. The liver is mildly enlarged at 21 cm. Diffuse hepatic parenchymal hyperechogenicity with sonic attenuation is most commonly associated with fatty infiltration. No focal liver lesion is seen. The spleen is mildly enlarged at 14.2 cm. The pancreas, kidneys, aorta, and IVC appear unremarkable as imaged. 1.  Mild hepatosplenomegaly and probable hepatic steatosis no definite focal liver mass identified. 2.  No biliary ductal dilatation status post cholecystectomy. XR CHEST PORTABLE    Result Date: 6/2/2022  Portable chest: History: Dyspnea Comparison: None Findings: A single view of the chest was obtained at 12:36 PM. The cardiac silhouette is normal in size and configuration. The lungs and pleural spaces are clear. The pulmonary vascularity is within normal limits. Unremarkable portable chest radiograph.          Labs: Results:       BMP, Mg, Phos Recent Labs     06/02/22 1219 06/03/22 0439    146*   K 4.2 2.8*   * 108*   CO2 26 27   BUN 6 5*   MG  --  1.9      CBC Recent Labs     06/02/22 1219 06/03/22 0439 06/03/22  1844   WBC 6.6 6.2  --    RBC 2.82* 2.48*  --    HGB 7.1* 6.3* 7.5*   HCT 23.6* 21.1* 24.8*    184  --       LFT Recent Labs     06/02/22 1219 06/03/22 0439   ALT 17 12   GLOB 3.5 2.9 Cardiac Testing No results found for: BNP, CPK, RCK1, CKMB   Coagulation Tests No results found for: INR, APTT   A1c No results found for: HBA1C   Lipid Panel No results found for: CHOL, CHOLPOCT, CHOLX, CHLST, CHOLV, 949402, HDL, HDLC, LDL, LDLC, 346039, VLDLC, VLDL, TGLX, TRIGL   Thyroid Panel Lab Results   Component Value Date    TSH 1.840 11/03/2021    TSH 1.460 11/10/2020        Most Recent UA No results found for: MUCUS, UCOM       All Labs from Last 24 Hrs:  Recent Results (from the past 24 hour(s))   Comprehensive Metabolic Panel w/ Reflex to MG    Collection Time: 06/03/22  4:39 AM   Result Value Ref Range    Sodium 146 (H) 136 - 145 mmol/L    Potassium 2.8 (L) 3.5 - 5.1 mmol/L    Chloride 108 (H) 98 - 107 mmol/L    CO2 27 21 - 32 mmol/L    Anion Gap 11 7 - 16 mmol/L    Glucose 79 65 - 100 mg/dL    BUN 5 (L) 6 - 23 MG/DL    CREATININE 0.50 (L) 0.6 - 1.0 MG/DL    GFR African American >60 >60 ml/min/1.73m2    GFR Non- >60 >60 ml/min/1.73m2    Calcium 7.5 (L) 8.3 - 10.4 MG/DL    Total Bilirubin 2.0 (H) 0.2 - 1.1 MG/DL    ALT 12 12 - 65 U/L    AST 17 15 - 37 U/L    Alk Phosphatase 158 (H) 50 - 130 U/L    Total Protein 4.9 (L) 6.3 - 8.2 g/dL    Albumin 2.0 (L) 3.5 - 5.0 g/dL    Globulin 2.9 2.3 - 3.5 g/dL    Albumin/Globulin Ratio 0.7 (L) 1.2 - 3.5     CBC with Auto Differential    Collection Time: 06/03/22  4:39 AM   Result Value Ref Range    WBC 6.2 4.3 - 11.1 K/uL    RBC 2.48 (L) 4.05 - 5.2 M/uL    Hemoglobin 6.3 (LL) 11.7 - 15.4 g/dL    Hematocrit 21.1 (L) 35.8 - 46.3 %    MCV 85.1 79.6 - 97.8 FL    MCH 25.4 (L) 26.1 - 32.9 PG    MCHC 29.9 (L) 31.4 - 35.0 g/dL    RDW 26.0 (H) 11.9 - 14.6 %    Platelets 474 072 - 024 K/uL    MPV 11.5 9.4 - 12.3 FL    nRBC 0.00 0.0 - 0.2 K/uL    Seg Neutrophils 47 43 - 78 %    Lymphocytes 42 13 - 44 %    Monocytes 9 4.0 - 12.0 %    Eosinophils % 1 0.5 - 7.8 %    Basophils 0 0.0 - 2.0 %    Immature Granulocytes 1 0.0 - 5.0 %    Segs Absolute 2.8 1.7 - 8.2 K/UL Absolute Lymph # 2.6 0.5 - 4.6 K/UL    Absolute Mono # 0.6 0.1 - 1.3 K/UL    Absolute Eos # 0.1 0.0 - 0.8 K/UL    Basophils Absolute 0.0 0.0 - 0.2 K/UL    Absolute Immature Granulocyte 0.1 0.0 - 0.5 K/UL    RBC Comment SLIGHT  ANISOCYTOSIS        RBC Comment SLIGHT  POLYCHROMASIA        RBC Comment SLIGHT  MICROCYTOSIS        RBC Comment SLIGHT  HYPOCHROMIA        WBC Comment Result Confirmed By Smear      Platelet Comment ADEQUATE      Differential Type AUTOMATED     Prealbumin    Collection Time: 06/03/22  4:39 AM   Result Value Ref Range    Prealbumin 3.3 (L) 18.0 - 35.7 MG/DL   Magnesium    Collection Time: 06/03/22  4:39 AM   Result Value Ref Range    Magnesium 1.9 1.8 - 2.4 mg/dL   Haptoglobin    Collection Time: 06/03/22  4:39 AM   Result Value Ref Range    Haptoglobin <8 (L) 30 - 200 mg/dL   Iron    Collection Time: 06/03/22  4:39 AM   Result Value Ref Range    Iron 24 (L) 35 - 150 ug/dL   PREPARE RBC (CROSSMATCH), 1 Units    Collection Time: 06/03/22  5:30 AM   Result Value Ref Range    History Check Historical check performed    Hemoglobin and Hematocrit    Collection Time: 06/03/22  6:44 PM   Result Value Ref Range    Hemoglobin 7.5 (L) 11.7 - 15.4 g/dL    Hematocrit 24.8 (L) 35.8 - 46.3 %       No Known Allergies    There is no immunization history on file for this patient.     Recent Vital Data:  Patient Vitals for the past 24 hrs:   Temp Pulse Resp BP SpO2   06/03/22 1603 98.4 °F (36.9 °C) 82 17 123/79 100 %   06/03/22 1155 98.4 °F (36.9 °C) 73 17 (!) 115/95 100 %   06/03/22 1040 97.9 °F (36.6 °C) 92 18 (!) 134/100 93 %   06/03/22 0835 97.8 °F (36.6 °C) 85 18 122/79 99 %   06/03/22 0811 97.7 °F (36.5 °C) 86 16 108/72 100 %   06/03/22 0732 97.7 °F (36.5 °C) 80 16 107/66 99 %   06/03/22 0553 97.7 °F (36.5 °C) 83 16 115/70 99 %   06/03/22 0340 97.5 °F (36.4 °C) 85 16 108/69 98 %   06/02/22 2323 97.7 °F (36.5 °C) 78 18 91/62 100 %   06/02/22 2124 -- 100 16 -- 90 %       Oxygen Therapy  SpO2: 100 %  Pulse Oximeter Device Mode: Intermittent  O2 Device: None (Room air)    Estimated body mass index is 33.25 kg/m² as calculated from the following:    Height as of this encounter: 5' 6\" (1.676 m). Weight as of this encounter: 206 lb (93.4 kg). Intake/Output Summary (Last 24 hours) at 6/3/2022 1938  Last data filed at 6/3/2022 1846  Gross per 24 hour   Intake 1492.5 ml   Output 0 ml   Net 1492.5 ml         Physical Exam:  General:    Well nourished. No overt distress  Head:  Normocephalic, atraumatic  Eyes:  Sclerae appear normal.  Pupils equally round. HENT:  Nares appear normal, no drainage. Moist mucous membranes  Neck:  No restricted ROM. Trachea midline  CV:   RRR. No m/r/g. No JVD  Lungs:   CTAB. No wheezing, rhonchi, or rales. Even, unlabored  Abdomen:   Soft, nontender, nondistended. Extremities: Warm and dry. No cyanosis or clubbing. No edema. Skin:     No rashes. Normal coloration  Neuro:  CN II-XII grossly intact. Psych:  Normal mood and affect. Signed:  Neena Lau DO    Part of this note may have been written by using a voice dictation software. The note has been proof read but may still contain some grammatical/other typographical errors.

## 2022-06-04 LAB
ABO + RH BLD: NORMAL
BLD PROD TYP BPU: NORMAL
BLOOD BANK DISPENSE STATUS: NORMAL
BLOOD GROUP ANTIBODIES SERPL: NORMAL
BPU ID: NORMAL
CROSSMATCH RESULT: NORMAL
SPECIMEN EXP DATE BLD: NORMAL
UNIT DIVISION: 0

## 2022-06-13 ENCOUNTER — TELEMEDICINE (OUTPATIENT)
Dept: FAMILY MEDICINE CLINIC | Facility: CLINIC | Age: 32
End: 2022-06-13
Payer: COMMERCIAL

## 2022-06-13 DIAGNOSIS — D50.9 MICROCYTIC ANEMIA: ICD-10-CM

## 2022-06-13 DIAGNOSIS — E87.6 ACUTE HYPOKALEMIA: ICD-10-CM

## 2022-06-13 DIAGNOSIS — F41.9 ANXIETY AND DEPRESSION: ICD-10-CM

## 2022-06-13 DIAGNOSIS — Z98.84 STATUS POST GASTRIC BYPASS FOR OBESITY: ICD-10-CM

## 2022-06-13 DIAGNOSIS — I10 ESSENTIAL HYPERTENSION: Primary | ICD-10-CM

## 2022-06-13 DIAGNOSIS — R60.0 BILATERAL LEG EDEMA: ICD-10-CM

## 2022-06-13 DIAGNOSIS — F32.A ANXIETY AND DEPRESSION: ICD-10-CM

## 2022-06-13 DIAGNOSIS — E88.09 HYPOALBUMINEMIA: ICD-10-CM

## 2022-06-13 PROCEDURE — 99214 OFFICE O/P EST MOD 30 MIN: CPT | Performed by: FAMILY MEDICINE

## 2022-06-13 RX ORDER — DULOXETIN HYDROCHLORIDE 30 MG/1
30 CAPSULE, DELAYED RELEASE ORAL DAILY
Qty: 90 CAPSULE | Refills: 1 | Status: SHIPPED | OUTPATIENT
Start: 2022-06-13

## 2022-06-13 RX ORDER — LAMOTRIGINE 25 MG/1
75 TABLET ORAL DAILY
Qty: 270 TABLET | Refills: 1 | Status: SHIPPED
Start: 2022-06-13 | End: 2022-06-17 | Stop reason: SINTOL

## 2022-06-13 ASSESSMENT — PATIENT HEALTH QUESTIONNAIRE - PHQ9
2. FEELING DOWN, DEPRESSED OR HOPELESS: 0
SUM OF ALL RESPONSES TO PHQ9 QUESTIONS 1 & 2: 0
3. TROUBLE FALLING OR STAYING ASLEEP: 0
1. LITTLE INTEREST OR PLEASURE IN DOING THINGS: 0
9. THOUGHTS THAT YOU WOULD BE BETTER OFF DEAD, OR OF HURTING YOURSELF: 0
8. MOVING OR SPEAKING SO SLOWLY THAT OTHER PEOPLE COULD HAVE NOTICED. OR THE OPPOSITE, BEING SO FIGETY OR RESTLESS THAT YOU HAVE BEEN MOVING AROUND A LOT MORE THAN USUAL: 0
SUM OF ALL RESPONSES TO PHQ QUESTIONS 1-9: 1
5. POOR APPETITE OR OVEREATING: 0
6. FEELING BAD ABOUT YOURSELF - OR THAT YOU ARE A FAILURE OR HAVE LET YOURSELF OR YOUR FAMILY DOWN: 0
SUM OF ALL RESPONSES TO PHQ QUESTIONS 1-9: 1
4. FEELING TIRED OR HAVING LITTLE ENERGY: 1
SUM OF ALL RESPONSES TO PHQ QUESTIONS 1-9: 1
SUM OF ALL RESPONSES TO PHQ QUESTIONS 1-9: 1
7. TROUBLE CONCENTRATING ON THINGS, SUCH AS READING THE NEWSPAPER OR WATCHING TELEVISION: 0

## 2022-06-13 NOTE — PROGRESS NOTES
Puja Erazo is a 36 y.o. female who was seen by synchronous (real-time) audio-video technology on 6/13/2022. Subjective:   Puja Erazo was seen for Anxiety and Hypertension  3 mo chronic f/u    Pt started swelling again and ended up falling and hitting her head. She went to the ER on 6/2 for this. Hg was 6.3 and they gave her a blood transfusion, brought it up to 7.5. MCV normal. Iron 24  Prealbumin was 3.3  K 2.8 - was written for K supp but [employer] had to order it so hasn't been on anything. Mag normal    She will be seeing GI 7/13    She's been really tired and sleeping a lot lately    Pt hasn't had a normal period in years. But then this past Sat she had vaginal fresh blood, and then that's it. Pt feels like she can feel like something in the ovary area. Anx/dep: no rash on the lamictal. Cymbalta. Isn't sure how her mood is doing - if fatigue is from anemia or uncontrolled mood  HTN: has been out of lopressor for about 1 wk. No Known Allergies      Objective:     General: alert, cooperative, fatigued, no distress and seen w/ mom   Mental  status: mental status: alert, oriented to person, place, and time, normal mood, behavior, speech, dress, motor activity, and thought processes   Resp: No distress. Neuro: No acute deficits   Skin: skin: no discoloration or lesions of concern on visible areas     Due to this being a TeleHealth evaluation, many elements of the physical examination are unable to be assessed. Assessment & Plan:   Davian Schilling was seen today for anxiety and hypertension. Diagnoses and all orders for this visit:    Essential hypertension  -     metoprolol tartrate (LOPRESSOR) 25 MG tablet;  Take 1 tablet by mouth 2 times daily    Hypoalbuminemia  -     Comprehensive Metabolic Panel    Bilateral leg edema    Acute hypokalemia  -     Comprehensive Metabolic Panel    Status post gastric bypass for obesity  -     [address] [place] [address] Hematology & Oncology    Anxiety and depression  -     DULoxetine (CYMBALTA) 30 MG extended release capsule; Take 1 capsule by mouth daily  -     lamoTRIgine (LAMICTAL) 25 MG tablet; Take 3 tablets by mouth daily    Microcytic anemia  -     [address] Hematology & Oncology      She hadn't been eating a whole lot and not eating well again. Encouraged her to increase her protein intake. Referral to [facility] for iron transfusions. She will call to schedule lab appt to f/u on K. Will keep her meds as is and allow time for labs to normalize. Significant anemia can certainly contribute to low mood. F/u 6 mos, sooner prn        CPT Codes 30059-32938 for Established Patients may apply to this [address] [place] [address] Road was evaluated through a synchronous (real-time) audio-video encounter. The patient (or guardian if applicable) is aware that this is a billable service, which includes applicable co-pays. This Virtual Visit was conducted with patient's (and/or leg guardian's) consent. The visit was conducted pursuant to the emergency declaration under the [address] [place] [address], [facility] waiver authority and the [employer] General Act. Patient identification was verified, and a caregiver was present when appropriate. The patient was located in a state where the provider was licensed to provide care. I was at home while conducting this encounter. Pt was at home. We discussed the expected course, resolution and complications of the diagnosis(es) in detail. Medication risks, benefits, costs, interactions, and alternatives were discussed as indicated. I advised her to contact the office if her condition worsens, changes or fails to improve as anticipated. She expressed understanding with the diagnosis(es) and plan.      Joanna Paredes DO

## 2022-06-17 ENCOUNTER — OFFICE VISIT (OUTPATIENT)
Dept: FAMILY MEDICINE CLINIC | Facility: CLINIC | Age: 32
End: 2022-06-17
Payer: COMMERCIAL

## 2022-06-17 VITALS
DIASTOLIC BLOOD PRESSURE: 58 MMHG | HEIGHT: 66 IN | WEIGHT: 196.6 LBS | BODY MASS INDEX: 31.6 KG/M2 | SYSTOLIC BLOOD PRESSURE: 99 MMHG | HEART RATE: 91 BPM | TEMPERATURE: 97.3 F

## 2022-06-17 DIAGNOSIS — R21 RASH AND NONSPECIFIC SKIN ERUPTION: Primary | ICD-10-CM

## 2022-06-17 DIAGNOSIS — R60.0 BILATERAL LEG EDEMA: ICD-10-CM

## 2022-06-17 LAB
ALBUMIN SERPL-MCNC: 2.3 G/DL (ref 3.5–5)
ALBUMIN/GLOB SERPL: 0.8 {RATIO} (ref 1.2–3.5)
ALP SERPL-CCNC: 161 U/L (ref 50–136)
ALT SERPL-CCNC: 18 U/L (ref 12–65)
ANION GAP SERPL CALC-SCNC: 9 MMOL/L (ref 7–16)
AST SERPL-CCNC: 23 U/L (ref 15–37)
BILIRUB SERPL-MCNC: 5.1 MG/DL (ref 0.2–1.1)
BUN SERPL-MCNC: 5 MG/DL (ref 6–23)
CALCIUM SERPL-MCNC: 8.1 MG/DL (ref 8.3–10.4)
CHLORIDE SERPL-SCNC: 108 MMOL/L (ref 98–107)
CO2 SERPL-SCNC: 27 MMOL/L (ref 21–32)
CREAT SERPL-MCNC: 0.7 MG/DL (ref 0.6–1)
GLOBULIN SER CALC-MCNC: 3 G/DL (ref 2.3–3.5)
GLUCOSE SERPL-MCNC: 107 MG/DL (ref 65–100)
POTASSIUM SERPL-SCNC: 3.8 MMOL/L (ref 3.5–5.1)
PROT SERPL-MCNC: 5.3 G/DL (ref 6.3–8.2)
SODIUM SERPL-SCNC: 144 MMOL/L (ref 136–145)

## 2022-06-17 PROCEDURE — 99214 OFFICE O/P EST MOD 30 MIN: CPT | Performed by: FAMILY MEDICINE

## 2022-06-17 ASSESSMENT — PATIENT HEALTH QUESTIONNAIRE - PHQ9
2. FEELING DOWN, DEPRESSED OR HOPELESS: 0
SUM OF ALL RESPONSES TO PHQ QUESTIONS 1-9: 0
SUM OF ALL RESPONSES TO PHQ9 QUESTIONS 1 & 2: 0
SUM OF ALL RESPONSES TO PHQ QUESTIONS 1-9: 0
1. LITTLE INTEREST OR PLEASURE IN DOING THINGS: 0
SUM OF ALL RESPONSES TO PHQ9 QUESTIONS 1 & 2: 0
SUM OF ALL RESPONSES TO PHQ QUESTIONS 1-9: 0
SUM OF ALL RESPONSES TO PHQ QUESTIONS 1-9: 0
2. FEELING DOWN, DEPRESSED OR HOPELESS: 0
1. LITTLE INTEREST OR PLEASURE IN DOING THINGS: 0
SUM OF ALL RESPONSES TO PHQ QUESTIONS 1-9: 0

## 2022-06-17 ASSESSMENT — ANXIETY QUESTIONNAIRES
GAD7 TOTAL SCORE: 1
4. TROUBLE RELAXING: 1
6. BECOMING EASILY ANNOYED OR IRRITABLE: 0
3. WORRYING TOO MUCH ABOUT DIFFERENT THINGS: 0
IF YOU CHECKED OFF ANY PROBLEMS ON THIS QUESTIONNAIRE, HOW DIFFICULT HAVE THESE PROBLEMS MADE IT FOR YOU TO DO YOUR WORK, TAKE CARE OF THINGS AT HOME, OR GET ALONG WITH OTHER PEOPLE: NOT DIFFICULT AT ALL
7. FEELING AFRAID AS IF SOMETHING AWFUL MIGHT HAPPEN: 0
1. FEELING NERVOUS, ANXIOUS, OR ON EDGE: 0
7. FEELING AFRAID AS IF SOMETHING AWFUL MIGHT HAPPEN: 0
2. NOT BEING ABLE TO STOP OR CONTROL WORRYING: 0
4. TROUBLE RELAXING: 0
IF YOU CHECKED OFF ANY PROBLEMS ON THIS QUESTIONNAIRE, HOW DIFFICULT HAVE THESE PROBLEMS MADE IT FOR YOU TO DO YOUR WORK, TAKE CARE OF THINGS AT HOME, OR GET ALONG WITH OTHER PEOPLE: NOT DIFFICULT AT ALL
2. NOT BEING ABLE TO STOP OR CONTROL WORRYING: 0
GAD7 TOTAL SCORE: 0
1. FEELING NERVOUS, ANXIOUS, OR ON EDGE: 0
5. BEING SO RESTLESS THAT IT IS HARD TO SIT STILL: 0
6. BECOMING EASILY ANNOYED OR IRRITABLE: 0
3. WORRYING TOO MUCH ABOUT DIFFERENT THINGS: 0
5. BEING SO RESTLESS THAT IT IS HARD TO SIT STILL: 0

## 2022-06-17 NOTE — PROGRESS NOTES
Bruising on arms and hands from IVs   Neurological:      General: No focal deficit present. Mental Status: She is alert and oriented to person, place, and time. Cranial Nerves: No cranial nerve deficit.                Hillary Elizabeth DO  6/17/2022  1:31 PM

## 2022-06-20 ENCOUNTER — TELEPHONE (OUTPATIENT)
Dept: FAMILY MEDICINE CLINIC | Facility: CLINIC | Age: 32
End: 2022-06-20

## 2022-06-20 NOTE — TELEPHONE ENCOUNTER
Called and left voicemail with nereida about her rash. Told them that if they feel like the rash is spreading and is worse. They were instructed to go to the ER that was discussed in last visit.

## 2022-06-20 NOTE — TELEPHONE ENCOUNTER
Called patient to go over lab results. Her legs feels a little better but mom thinks the rash is spreading. Has an appointment on Wednesday.

## 2022-06-22 ENCOUNTER — OFFICE VISIT (OUTPATIENT)
Dept: FAMILY MEDICINE CLINIC | Facility: CLINIC | Age: 32
End: 2022-06-22
Payer: COMMERCIAL

## 2022-06-22 VITALS
BODY MASS INDEX: 31.43 KG/M2 | TEMPERATURE: 97.3 F | OXYGEN SATURATION: 93 % | SYSTOLIC BLOOD PRESSURE: 104 MMHG | WEIGHT: 195.6 LBS | HEART RATE: 91 BPM | DIASTOLIC BLOOD PRESSURE: 57 MMHG | HEIGHT: 66 IN

## 2022-06-22 DIAGNOSIS — E88.09 HYPOALBUMINEMIA: ICD-10-CM

## 2022-06-22 DIAGNOSIS — R21 RASH: ICD-10-CM

## 2022-06-22 DIAGNOSIS — L24.A9 WOUND DRAINAGE: ICD-10-CM

## 2022-06-22 DIAGNOSIS — I10 ESSENTIAL HYPERTENSION: ICD-10-CM

## 2022-06-22 DIAGNOSIS — R60.0 BILATERAL LEG EDEMA: Primary | ICD-10-CM

## 2022-06-22 DIAGNOSIS — D64.9 ANEMIA, UNSPECIFIED TYPE: ICD-10-CM

## 2022-06-22 LAB
BASOPHILS # BLD: 0 K/UL (ref 0–0.2)
BASOPHILS NFR BLD: 0 % (ref 0–2)
DIFFERENTIAL METHOD BLD: ABNORMAL
EOSINOPHIL # BLD: 0.1 K/UL (ref 0–0.8)
EOSINOPHIL NFR BLD: 1 % (ref 0.5–7.8)
ERYTHROCYTE [DISTWIDTH] IN BLOOD BY AUTOMATED COUNT: 28.9 % (ref 11.9–14.6)
HCT VFR BLD AUTO: 27 % (ref 35.8–46.3)
HGB BLD-MCNC: 7.9 G/DL (ref 11.7–15.4)
IMM GRANULOCYTES # BLD AUTO: 0.1 K/UL (ref 0–0.5)
IMM GRANULOCYTES NFR BLD AUTO: 1 % (ref 0–5)
LYMPHOCYTES # BLD: 4.6 K/UL (ref 0.5–4.6)
LYMPHOCYTES NFR BLD: 31 % (ref 13–44)
MCH RBC QN AUTO: 29.4 PG (ref 26.1–32.9)
MCHC RBC AUTO-ENTMCNC: 29.3 G/DL (ref 31.4–35)
MCV RBC AUTO: 100.4 FL (ref 79.6–97.8)
MONOCYTES # BLD: 0.7 K/UL (ref 0.1–1.3)
MONOCYTES NFR BLD: 5 % (ref 4–12)
NEUTS SEG # BLD: 9.4 K/UL (ref 1.7–8.2)
NEUTS SEG NFR BLD: 62 % (ref 43–78)
NRBC # BLD: 0 K/UL (ref 0–0.2)
PLATELET # BLD AUTO: 218 K/UL (ref 150–450)
PLATELET COMMENT: ADEQUATE
PMV BLD AUTO: 12.2 FL (ref 9.4–12.3)
RBC # BLD AUTO: 2.69 M/UL (ref 4.05–5.2)
RBC MORPH BLD: ABNORMAL
WBC # BLD AUTO: 14.9 K/UL (ref 4.3–11.1)
WBC MORPH BLD: ABNORMAL

## 2022-06-22 PROCEDURE — 99214 OFFICE O/P EST MOD 30 MIN: CPT | Performed by: FAMILY MEDICINE

## 2022-06-22 RX ORDER — M-VIT,TX,IRON,MINS/CALC/FOLIC 27MG-0.4MG
1 TABLET ORAL DAILY
COMMUNITY

## 2022-06-22 RX ORDER — FUROSEMIDE 20 MG/1
20 TABLET ORAL DAILY
Qty: 30 TABLET | Refills: 0 | Status: SHIPPED | OUTPATIENT
Start: 2022-06-22

## 2022-06-22 ASSESSMENT — ANXIETY QUESTIONNAIRES
2. NOT BEING ABLE TO STOP OR CONTROL WORRYING: 0
GAD7 TOTAL SCORE: 0
6. BECOMING EASILY ANNOYED OR IRRITABLE: 0
IF YOU CHECKED OFF ANY PROBLEMS ON THIS QUESTIONNAIRE, HOW DIFFICULT HAVE THESE PROBLEMS MADE IT FOR YOU TO DO YOUR WORK, TAKE CARE OF THINGS AT HOME, OR GET ALONG WITH OTHER PEOPLE: NOT DIFFICULT AT ALL
5. BEING SO RESTLESS THAT IT IS HARD TO SIT STILL: 0
1. FEELING NERVOUS, ANXIOUS, OR ON EDGE: 0
3. WORRYING TOO MUCH ABOUT DIFFERENT THINGS: 0
4. TROUBLE RELAXING: 0
7. FEELING AFRAID AS IF SOMETHING AWFUL MIGHT HAPPEN: 0

## 2022-06-22 ASSESSMENT — PATIENT HEALTH QUESTIONNAIRE - PHQ9
SUM OF ALL RESPONSES TO PHQ QUESTIONS 1-9: 0
SUM OF ALL RESPONSES TO PHQ9 QUESTIONS 1 & 2: 0
2. FEELING DOWN, DEPRESSED OR HOPELESS: 0
SUM OF ALL RESPONSES TO PHQ QUESTIONS 1-9: 0
1. LITTLE INTEREST OR PLEASURE IN DOING THINGS: 0

## 2022-06-22 NOTE — PROGRESS NOTES
Dyllan Nj is a 36 y.o. female here today for   Chief Complaint   Patient presents with    Rash     not sure if it has gotten any better. some areas of more red today than they were. ASSESSMENT/PLAN:   Diagnosis Orders   1. Bilateral leg edema  furosemide (LASIX) 20 MG tablet    [facility]   2. Essential hypertension     3. Hypoalbuminemia     4. Rash  [facility]   5. Anemia, unspecified type  CBC with Auto Differential   6. Wound drainage  [facility]     Pt was given integra iron tablets to start BID. Checking CBC. Start lasix for edema. Given nonadherent wrap and xeroform to change at minimum daily, BID if necessary, until seen by wound care. Referral to derm for extensive rash. Cut lopressor in half to 12.5mg BID. The anemia, low BP could be contributing to her fatigue  F/u prn as she should get in w/ specialists    HPI:  1 wk f/u rash and b/l LE edema  lamictal was stopped. Is still having weeping from lesions on posterior calves. Hasn't been putting any sort of bandage on them. Has increased her protein intake. The rash on abdomen and lower back and buttocks is unchanged but very troublesome. appt w/ heme isn't until 7/5. Is still feeling very tired. BP (!) 104/57 (Site: Left Upper Arm, Position: Sitting, Cuff Size: Large Adult)   Pulse 91   Temp 97.3 °F (36.3 °C) (Temporal)   Ht 5' 6\" (1.676 m)   Wt 195 lb 9.6 oz (88.7 kg)   SpO2 93%   BMI 31.57 kg/m²      Physical Exam  Vitals reviewed. Constitutional:       General: She is in acute distress (weak, using a walker today. requires assistance going from sitting to standing). Musculoskeletal:      Comments: Significant edema of b/l LEs. Skin:     Comments: Scabbed over area b/l lower abdomen unchanged from last week. Macular rash on lower back and buttocks.  Weeping wounds b/l posterior calves, no sign of secondary infection   Neurological:      General: No focal deficit present. Mental Status: She is alert and oriented to person, place, and time. Cranial Nerves: No cranial nerve deficit.                Jose Palacios DO  6/22/2022  10:48 AM

## 2022-06-23 DIAGNOSIS — D50.8 OTHER IRON DEFICIENCY ANEMIA: Primary | ICD-10-CM

## 2022-06-23 RX ORDER — CEPHALEXIN 500 MG/1
500 CAPSULE ORAL 3 TIMES DAILY
Qty: 21 CAPSULE | Refills: 0 | Status: SHIPPED | OUTPATIENT
Start: 2022-06-23 | End: 2022-06-30

## 2022-06-24 DIAGNOSIS — D72.829 LEUKOCYTOSIS, UNSPECIFIED TYPE: ICD-10-CM

## 2022-06-24 DIAGNOSIS — R17 ELEVATED BILIRUBIN: Primary | ICD-10-CM

## 2022-06-24 DIAGNOSIS — D53.9 MACROCYTIC ANEMIA: ICD-10-CM

## 2022-06-24 DIAGNOSIS — Z98.84 STATUS POST GASTRIC BYPASS FOR OBESITY: ICD-10-CM

## 2022-06-24 NOTE — PROGRESS NOTES
New Patient Abstract    Reason for Referral: Other iron deficiency anemia    Referring Provider:  Olga Price DO    Primary Care Provider: Olga Price DO    Family History of Cancer/Hematologic Disorders: Mother with ovarian cancer    Presenting Symptoms: fatigue    Narrative with recent with Results/Procedures/Biopsies and Dates completed:   Ms. Mack Ortiz is a 74-year-old  female who reports to be a current every day smoker of 0.5 pack per day of cigarettes. She reports alcohol use and drug substance use as not currently. Her medical history reports as abnormal pap smear, stomach ulcer, anxiety, bilateral LE edema, HTN and interstitial cystitis. Her surgical history reports as cholecystectomy, tonsillectomy, EGD, hysteroscopy, cystoscopy, colonoscopy and gastric bypass (2010). On 6/2/22, Ms. Mack Ortiz presented to [facility] ED with complaints of body swelling and a syncopal fall that she hit her head on the counter as she fell. Her hemoglobin in ED reported decreased to 7.1. ED reported hemoccult negative. She denied any bleeding and reports to not have menstrual cycles. Her albumin was decreased to 1.8 and her LFTs were elevated with an , AST of 51 and a total bilirubin of 2.3. Her renal function was normal, and her hepatitis screening reported non-reactive. Her repeat hemoglobin reported at 6.3 and she received 1 unit of PRBCs with a post transfusion hemoglobin reported at 7.5. Her 6/2/22 iron panel reported normal. She had an abdominal ultrasound due to elevated LFTs. The imaging reported mild hepatosplenomegaly and probable hepatic steatosis. Her 6/3/22 labs reported a decreased iron of 24, haptoglobin of <8 with an increased retic count of 3.7, absolute retic of 0.18051 and immature retic fraction of 30.4. She was reported as stable and was discharged home. On 6/13/22 she presented to her PCP via telemedicine for follow up.  She reported to not be eating well and was encouraged to increase her protein intake. A referral was placed to hematology to further evaluate her anemia and possible iron infusions. On 6/17/22 she saw her PCP for a rash over abdominal and groin. Her 6/17/22 CMP reported decreased total protein and albumin with elevated ALK and total bilirubin. She reports to be seeing GI on 7/13/22. She was to start oral iron supplement. On 6/22/22 she saw PCP for rash follow up. Rash remained therefore referral placed to dermatology. Her 6/22/22 CBC reported an elevated WBC of 14.9, MCV of 100.4 and RDW of 28.9 with a decreased RBC of 2.68, hemoglobin of 7.9, hematocrit of 27 and MCHC of 29.3.     Labs     11/10/2020 12:32 6/2/2022 12:19 6/2/2022 17:14 6/3/2022 04:39 6/3/2022 18:44 6/22/2022 11:36   WBC 6.5 6.6  6.2  14.9 (H)   RBC 3.84 2.82 (L)  2.48 (L)  2.69 (L)   Hemoglobin Quant 12.1 7.1 (L)  6.3 (LL) 7.5 (L) 7.9 (L)   Hematocrit 36.5 23.6 (L)  21.1 (L) 24.8 (L) 27.0 (L)   MCV 95 83.7  85.1  100.4 (H)   MCH 31.5 25.2 (L)  25.4 (L)  29.4   MCHC 33.2 30.1 (L)  29.9 (L)  29.3 (L)   MPV  12.2  11.5  12.2   RDW 13.6 25.5 (H)  26.0 (H)  28.9 (H)   Platelet Count 593 703  184  218   Platelet Comment    ADEQUATE  ADEQUATE   Neutrophils % 40        Lymphocyte % 51        Absolute Mono #  0.5  0.6  0.7   Monocytes % 6        Eosinophils % 2 1  1  1   Basophils % 1        Neutrophils Absolute 2.6        Lymphocytes Absolute 3.3 (H)        Monocytes Absolute 0.4        Eosinophils Absolute 0.1        Basophils Absolute 0.0 0.0  0.0  0.0   Differential Type  AUTOMATED  AUTOMATED  AUTOMATED   Seg Neutrophils  51  47  62   GRANULOCYTE ABSOLUTE COUNT 0.0        Segs Absolute  3.3  2.8  9.4 (H)   Lymphocytes  40  42  31   Absolute Lymph #  2.6  2.6  4.6   Monocytes  8  9  5   Absolute Eos #  0.1  0.1  0.1   Basophils  0  0  0   Immature Granulocytes 0 1  1  1   Nucleated Red Blood Cells  0.00  0.00  0.00   WBC Comment    Result Confirmed By Smear  RARE   RETIC HGB CONC.  31       Absolute Immature Granulocyte  0.0  0.1  0.1   RBC Comment    SLIGHT. .. MODERATE. .. Ferritin   12      Iron   41 24 (L)     TIBC   274      FOLATE, FOLAT   12.8      Vitamin B-12   891      Haptoglobin    <8 (L)     IMMATURE RETIC FRACTION  30.4 (H)       Absolute Retic #  0.1045 (H)          11/10/2020 12:32 6/2/2022 12:19 6/2/2022 17:14 6/3/2022 04:39 6/17/2022 11:35   Sodium 143 143  146 (H) 144   Potassium 3.9 4.2  2.8 (L) 3.8   Chloride 107 (H) 110 (H)  108 (H) 108 (H)   CO2 25 26  27 27   BUN 17 6  5 (L) 5 (L)   Creatinine 0.49 (L) 0.60  0.50 (L) 0.70   Bun/Cre Ratio 35 (H)       Anion Gap  7  11 9   GFR Non-African American  >60  >60 >60   EGFR IF NonAfrican American 134       FOLATE   12.8     GFR  154 >60  >60 >60   Magnesium    1.9    GLUCOSE 74 87  79 107 (H)   CALCIUM 8.0 (L) 7.5 (L)  7.5 (L) 8.1 (L)   ALBUMIN/GLOBULIN RATIO  0.5 (L)  0.7 (L) 0.8 (L)   Total Protein 5.2 (L) 5.3 (L)  4.9 (L) 5.3 (L)   TRANSFERRIN SATURATION   15     Vitamin B-12   891     Albumin 2.8 (L) 1.8 (L)  2.0 (L) 2.3 (L)   Globulin  3.5  2.9 3.0   Albumin/Globulin Ratio 1.2       Alk Phos 116       Alk Phosphatase  187 (H)  158 (H) 161 (H)   ALT 25 17  12 18   AST 25 51 (H)  17 23   Bilirubin 0.5 2.3 (H)  2.0 (H) 5.1 (H)   Prealbumin    3.3 (L)       6/2/2022 14:43   Hep A IgM NONREACTIVE   Hepatitis B Surface Ag NONREACTIVE   Hepatitis C Ab NONREACTIVE   Hep B Core Ab, IgM NONREACTIVE     6/2/22 Abdominal Ultrasound     FINDINGS: Multiple images from real time ultrasound evaluation of the abdomen   show that the gallbladder is surgically absent. The common bile duct is normal   in caliber at 5 mm, and no significant intrahepatic bilary ductal dilatation is   evident. Diffuse hepatic parenchymal hyperechogenicity with sonic attenuation   is most commonly associated with fatty infiltration. The liver is mildly   enlarged at 21 cm.   Diffuse hepatic parenchymal hyperechogenicity with sonic   attenuation is most commonly associated with fatty infiltration. No focal liver   lesion is seen. The spleen is mildly enlarged at 14.2 cm. The pancreas,   kidneys, aorta, and IVC appear unremarkable as imaged. Impression       1. Mild hepatosplenomegaly and probable hepatic steatosis no definite focal   liver mass identified. 2.  No biliary ductal dilatation status post cholecystectomy. Notes from Referring Provider: n/a    Other Pertinent Information: She saw pain management in the past for chronic pain however was discharged in 9/2018 due to breaking their contract with use of narcotics and THC.      Presented at Tumor Board: n/a

## 2022-06-28 ENCOUNTER — OFFICE VISIT (OUTPATIENT)
Dept: ONCOLOGY | Age: 32
End: 2022-06-28
Payer: COMMERCIAL

## 2022-06-28 ENCOUNTER — HOSPITAL ENCOUNTER (OUTPATIENT)
Dept: LAB | Age: 32
Discharge: HOME | End: 2022-07-01
Payer: COMMERCIAL

## 2022-06-28 VITALS
BODY MASS INDEX: 29.6 KG/M2 | OXYGEN SATURATION: 100 % | HEART RATE: 101 BPM | DIASTOLIC BLOOD PRESSURE: 60 MMHG | HEIGHT: 66 IN | WEIGHT: 184.2 LBS | RESPIRATION RATE: 16 BRPM | SYSTOLIC BLOOD PRESSURE: 111 MMHG | TEMPERATURE: 97.9 F

## 2022-06-28 DIAGNOSIS — D72.829 LEUKOCYTOSIS, UNSPECIFIED TYPE: ICD-10-CM

## 2022-06-28 DIAGNOSIS — D53.9 MACROCYTIC ANEMIA: ICD-10-CM

## 2022-06-28 DIAGNOSIS — D53.9 MACROCYTIC ANEMIA: Primary | ICD-10-CM

## 2022-06-28 DIAGNOSIS — R17 ELEVATED BILIRUBIN: ICD-10-CM

## 2022-06-28 DIAGNOSIS — Z98.84 STATUS POST GASTRIC BYPASS FOR OBESITY: ICD-10-CM

## 2022-06-28 LAB
ALBUMIN SERPL-MCNC: 1.9 G/DL (ref 3.5–5)
ALBUMIN/GLOB SERPL: 0.6 {RATIO} (ref 1.2–3.5)
ALP SERPL-CCNC: 132 U/L (ref 50–136)
ALT SERPL-CCNC: 33 U/L (ref 12–65)
ANION GAP SERPL CALC-SCNC: 10 MMOL/L (ref 7–16)
APPEARANCE UR: CLEAR
AST SERPL-CCNC: 49 U/L (ref 15–37)
BACTERIA URNS QL MICRO: ABNORMAL /HPF
BASOPHILS # BLD: 0 K/UL (ref 0–0.2)
BASOPHILS NFR BLD: 0 % (ref 0–2)
BILIRUB SERPL-MCNC: 9.7 MG/DL (ref 0.2–1.1)
BILIRUB UR QL: ABNORMAL
BUN SERPL-MCNC: 7 MG/DL (ref 6–23)
CALCIUM SERPL-MCNC: 7.4 MG/DL (ref 8.3–10.4)
CASTS URNS QL MICRO: ABNORMAL /LPF
CEA SERPL-MCNC: 6 NG/ML (ref 0–3)
CHLORIDE SERPL-SCNC: 108 MMOL/L (ref 98–107)
CO2 SERPL-SCNC: 27 MMOL/L (ref 21–32)
COLOR UR: ABNORMAL
CREAT SERPL-MCNC: 0.6 MG/DL (ref 0.6–1)
CRP SERPL-MCNC: 4 MG/DL (ref 0–0.9)
CRYSTALS URNS QL MICRO: 0 /LPF
DAT POLY-SP REAG RBC QL: NORMAL
DIFFERENTIAL METHOD BLD: ABNORMAL
EOSINOPHIL # BLD: 0.2 K/UL (ref 0–0.8)
EOSINOPHIL NFR BLD: 1 % (ref 0.5–7.8)
EPI CELLS #/AREA URNS HPF: ABNORMAL /HPF
ERYTHROCYTE [DISTWIDTH] IN BLOOD BY AUTOMATED COUNT: 28.7 % (ref 11.9–14.6)
ERYTHROCYTE [SEDIMENTATION RATE] IN BLOOD: 20 MM/HR (ref 0–20)
FERRITIN SERPL-MCNC: 74 NG/ML (ref 8–388)
FOLATE SERPL-MCNC: 8.6 NG/ML (ref 3.1–17.5)
GLOBULIN SER CALC-MCNC: 3.3 G/DL (ref 2.3–3.5)
GLUCOSE SERPL-MCNC: 83 MG/DL (ref 65–100)
GLUCOSE UR STRIP.AUTO-MCNC: 100 MG/DL
HAPTOGLOB SERPL-MCNC: <8 MG/DL (ref 30–200)
HAV IGM SER QL: NONREACTIVE
HBV CORE IGM SER QL: NONREACTIVE
HBV SURFACE AG SER QL: NONREACTIVE
HCT VFR BLD AUTO: 23.4 % (ref 35.8–46.3)
HCV AB SER QL: NONREACTIVE
HGB BLD-MCNC: 7.1 G/DL (ref 11.7–15.4)
HGB RETIC QN AUTO: 39 PG (ref 29–35)
HGB UR QL STRIP: ABNORMAL
HISTORY CHECK: NORMAL
HIV 1+2 AB+HIV1 P24 AG SERPL QL IA: NONREACTIVE
HIV 1/2 RESULT COMMENT: NORMAL
IMM GRANULOCYTES # BLD AUTO: 0.2 K/UL (ref 0–0.5)
IMM GRANULOCYTES NFR BLD AUTO: 1 % (ref 0–5)
IMM RETICS NFR: 32.1 % (ref 3–15.9)
IRON SATN MFR SERPL: 84 %
IRON SERPL-MCNC: 115 UG/DL (ref 35–150)
KETONES UR QL STRIP.AUTO: 15 MG/DL
LDH SERPL L TO P-CCNC: 204 U/L (ref 100–190)
LEUKOCYTE ESTERASE UR QL STRIP.AUTO: NEGATIVE
LYMPHOCYTES # BLD: 3.7 K/UL (ref 0.5–4.6)
LYMPHOCYTES NFR BLD: 24 % (ref 13–44)
Lab: NORMAL
MCH RBC QN AUTO: 31.1 PG (ref 26.1–32.9)
MCHC RBC AUTO-ENTMCNC: 30.3 G/DL (ref 31.4–35)
MCV RBC AUTO: 102.6 FL (ref 79.6–97.8)
MONOCYTES # BLD: 0.9 K/UL (ref 0.1–1.3)
MONOCYTES NFR BLD: 6 % (ref 4–12)
MUCOUS THREADS URNS QL MICRO: ABNORMAL /LPF
NEUTS SEG # BLD: 10.4 K/UL (ref 1.7–8.2)
NEUTS SEG NFR BLD: 68 % (ref 43–78)
NITRITE UR QL STRIP.AUTO: NEGATIVE
NRBC # BLD: 0 K/UL (ref 0–0.2)
OTHER OBSERVATIONS: ABNORMAL
PH UR STRIP: 5.5 [PH] (ref 5–9)
PHOSPHATE SERPL-MCNC: 2.6 MG/DL (ref 2.5–4.5)
PLATELET # BLD AUTO: 220 K/UL (ref 150–450)
PLATELET COMMENT: ADEQUATE
PMV BLD AUTO: 12 FL (ref 9.4–12.3)
POTASSIUM SERPL-SCNC: 3.3 MMOL/L (ref 3.5–5.1)
PROT SERPL-MCNC: 5.2 G/DL (ref 6.3–8.2)
PROT UR STRIP-MCNC: 30 MG/DL
RBC # BLD AUTO: 2.28 M/UL (ref 4.05–5.2)
RBC #/AREA URNS HPF: ABNORMAL /HPF
RBC MORPH BLD: ABNORMAL
REFERENCE LAB: NORMAL
RETICS # AUTO: 0.12 M/UL (ref 0.03–0.1)
RETICS/RBC NFR AUTO: 5.1 % (ref 0.3–2)
SODIUM SERPL-SCNC: 145 MMOL/L (ref 136–145)
SP GR UR REFRACTOMETRY: 1.02 (ref 1–1.02)
T4 FREE SERPL-MCNC: 1.1 NG/DL (ref 0.78–1.4)
TIBC SERPL-MCNC: 137 UG/DL (ref 250–450)
TSH, 3RD GENERATION: 1.45 UIU/ML (ref 0.36–3)
URATE SERPL-MCNC: 6.6 MG/DL (ref 2.6–6)
UROBILINOGEN UR QL STRIP.AUTO: >=8 EU/DL (ref 0.2–1)
VIT B12 SERPL-MCNC: 1836 PG/ML (ref 193–986)
WBC # BLD AUTO: 15.4 K/UL (ref 4.3–11.1)
WBC MORPH BLD: ABNORMAL
WBC URNS QL MICRO: ABNORMAL /HPF

## 2022-06-28 PROCEDURE — 86146 BETA-2 GLYCOPROTEIN ANTIBODY: CPT

## 2022-06-28 PROCEDURE — 83540 ASSAY OF IRON: CPT

## 2022-06-28 PROCEDURE — 83010 ASSAY OF HAPTOGLOBIN QUANT: CPT

## 2022-06-28 PROCEDURE — 84100 ASSAY OF PHOSPHORUS: CPT

## 2022-06-28 PROCEDURE — 82378 CARCINOEMBRYONIC ANTIGEN: CPT

## 2022-06-28 PROCEDURE — 85046 RETICYTE/HGB CONCENTRATE: CPT

## 2022-06-28 PROCEDURE — 82746 ASSAY OF FOLIC ACID SERUM: CPT

## 2022-06-28 PROCEDURE — 86140 C-REACTIVE PROTEIN: CPT

## 2022-06-28 PROCEDURE — 82607 VITAMIN B-12: CPT

## 2022-06-28 PROCEDURE — 80053 COMPREHEN METABOLIC PANEL: CPT

## 2022-06-28 PROCEDURE — 84550 ASSAY OF BLOOD/URIC ACID: CPT

## 2022-06-28 PROCEDURE — 86147 CARDIOLIPIN ANTIBODY EA IG: CPT

## 2022-06-28 PROCEDURE — 36415 COLL VENOUS BLD VENIPUNCTURE: CPT

## 2022-06-28 PROCEDURE — 82785 ASSAY OF IGE: CPT

## 2022-06-28 PROCEDURE — 84439 ASSAY OF FREE THYROXINE: CPT

## 2022-06-28 PROCEDURE — 83020 HEMOGLOBIN ELECTROPHORESIS: CPT

## 2022-06-28 PROCEDURE — 86038 ANTINUCLEAR ANTIBODIES: CPT

## 2022-06-28 PROCEDURE — 85025 COMPLETE CBC W/AUTO DIFF WBC: CPT

## 2022-06-28 PROCEDURE — 85732 THROMBOPLASTIN TIME PARTIAL: CPT

## 2022-06-28 PROCEDURE — 86157 COLD AGGLUTININ TITER: CPT

## 2022-06-28 PROCEDURE — 86037 ANCA TITER EACH ANTIBODY: CPT

## 2022-06-28 PROCEDURE — 86644 CMV ANTIBODY: CPT

## 2022-06-28 PROCEDURE — 86162 COMPLEMENT TOTAL (CH50): CPT

## 2022-06-28 PROCEDURE — 84238 ASSAY NONENDOCRINE RECEPTOR: CPT

## 2022-06-28 PROCEDURE — 99205 OFFICE O/P NEW HI 60 MIN: CPT | Performed by: PEDIATRICS

## 2022-06-28 PROCEDURE — 81374 HLA I TYPING 1 ANTIGEN LR: CPT

## 2022-06-28 PROCEDURE — 84630 ASSAY OF ZINC: CPT

## 2022-06-28 PROCEDURE — 82784 ASSAY IGA/IGD/IGG/IGM EACH: CPT

## 2022-06-28 PROCEDURE — 86665 EPSTEIN-BARR CAPSID VCA: CPT

## 2022-06-28 PROCEDURE — 86880 COOMBS TEST DIRECT: CPT

## 2022-06-28 PROCEDURE — 83921 ORGANIC ACID SINGLE QUANT: CPT

## 2022-06-28 PROCEDURE — 81003 URINALYSIS AUTO W/O SCOPE: CPT

## 2022-06-28 PROCEDURE — 85652 RBC SED RATE AUTOMATED: CPT

## 2022-06-28 PROCEDURE — 99417 PROLNG OP E/M EACH 15 MIN: CPT | Performed by: PEDIATRICS

## 2022-06-28 PROCEDURE — 82525 ASSAY OF COPPER: CPT

## 2022-06-28 PROCEDURE — 87389 HIV-1 AG W/HIV-1&-2 AB AG IA: CPT

## 2022-06-28 PROCEDURE — 80074 ACUTE HEPATITIS PANEL: CPT

## 2022-06-28 PROCEDURE — 82180 ASSAY OF ASCORBIC ACID: CPT

## 2022-06-28 PROCEDURE — 86901 BLOOD TYPING SEROLOGIC RH(D): CPT

## 2022-06-28 PROCEDURE — 86923 COMPATIBILITY TEST ELECTRIC: CPT

## 2022-06-28 PROCEDURE — 85598 HEXAGNAL PHOSPH PLTLT NEUTRL: CPT

## 2022-06-28 PROCEDURE — 84443 ASSAY THYROID STIM HORMONE: CPT

## 2022-06-28 PROCEDURE — 83615 LACTATE (LD) (LDH) ENZYME: CPT

## 2022-06-28 PROCEDURE — 82728 ASSAY OF FERRITIN: CPT

## 2022-06-28 PROCEDURE — 81015 MICROSCOPIC EXAM OF URINE: CPT

## 2022-06-28 RX ORDER — EPINEPHRINE 1 MG/ML
0.3 INJECTION, SOLUTION, CONCENTRATE INTRAVENOUS PRN
OUTPATIENT
Start: 2022-06-28

## 2022-06-28 RX ORDER — EPINEPHRINE 1 MG/ML
0.3 INJECTION, SOLUTION, CONCENTRATE INTRAVENOUS PRN
Status: CANCELLED | OUTPATIENT
Start: 2022-06-29

## 2022-06-28 RX ORDER — SODIUM CHLORIDE 9 MG/ML
INJECTION, SOLUTION INTRAVENOUS CONTINUOUS
Status: CANCELLED | OUTPATIENT
Start: 2022-06-29

## 2022-06-28 RX ORDER — DIPHENHYDRAMINE HYDROCHLORIDE 50 MG/ML
50 INJECTION INTRAMUSCULAR; INTRAVENOUS
Status: CANCELLED | OUTPATIENT
Start: 2022-06-29

## 2022-06-28 RX ORDER — DIPHENHYDRAMINE HCL 25 MG
25 CAPSULE ORAL ONCE
Status: CANCELLED | OUTPATIENT
Start: 2022-06-28 | End: 2022-06-28

## 2022-06-28 RX ORDER — ACETAMINOPHEN 325 MG/1
650 TABLET ORAL ONCE
Status: CANCELLED | OUTPATIENT
Start: 2022-06-28 | End: 2022-06-28

## 2022-06-28 RX ORDER — SODIUM CHLORIDE 0.9 % (FLUSH) 0.9 %
5-40 SYRINGE (ML) INJECTION PRN
Status: CANCELLED | OUTPATIENT
Start: 2022-06-28

## 2022-06-28 RX ORDER — ONDANSETRON 2 MG/ML
8 INJECTION INTRAMUSCULAR; INTRAVENOUS
Status: CANCELLED | OUTPATIENT
Start: 2022-06-29

## 2022-06-28 RX ORDER — SODIUM CHLORIDE 9 MG/ML
20 INJECTION, SOLUTION INTRAVENOUS CONTINUOUS
Status: CANCELLED | OUTPATIENT
Start: 2022-06-28

## 2022-06-28 RX ORDER — ACETAMINOPHEN 325 MG/1
650 TABLET ORAL
OUTPATIENT
Start: 2022-06-28

## 2022-06-28 RX ORDER — SODIUM CHLORIDE 9 MG/ML
25 INJECTION, SOLUTION INTRAVENOUS PRN
Status: CANCELLED | OUTPATIENT
Start: 2022-06-28

## 2022-06-28 RX ORDER — ONDANSETRON 2 MG/ML
8 INJECTION INTRAMUSCULAR; INTRAVENOUS
OUTPATIENT
Start: 2022-06-28

## 2022-06-28 RX ORDER — ACETAMINOPHEN 325 MG/1
650 TABLET ORAL
Status: CANCELLED | OUTPATIENT
Start: 2022-06-29

## 2022-06-28 RX ORDER — ALBUTEROL SULFATE 90 UG/1
4 AEROSOL, METERED RESPIRATORY (INHALATION) PRN
OUTPATIENT
Start: 2022-06-28

## 2022-06-28 RX ORDER — ALBUTEROL SULFATE 90 UG/1
4 AEROSOL, METERED RESPIRATORY (INHALATION) PRN
Status: CANCELLED | OUTPATIENT
Start: 2022-06-29

## 2022-06-28 RX ORDER — SODIUM CHLORIDE 9 MG/ML
INJECTION, SOLUTION INTRAVENOUS CONTINUOUS
OUTPATIENT
Start: 2022-06-28

## 2022-06-28 RX ORDER — DIPHENHYDRAMINE HYDROCHLORIDE 50 MG/ML
50 INJECTION INTRAMUSCULAR; INTRAVENOUS
OUTPATIENT
Start: 2022-06-28

## 2022-06-28 NOTE — PROGRESS NOTES
HISTORY OF PRESENT ILLNESS  Sriram Penaloza is a 36 y.o. y.o. female with  Microcytic anemia  ABSTRACT  Reason for Referral: Other iron deficiency anemia     Referring Provider:  Michael Ortega DO     Primary Care Provider: Michael Ortega DO     Family History of Cancer/Hematologic Disorders: Mother with ovarian cancer     Presenting Symptoms: fatigue     Narrative with recent with Results/Procedures/Biopsies and Dates completed:   Ms. Muriel Maxwell is a 26-year-old  female who reports to be a current every day smoker of 0.5 pack per day of cigarettes. She reports alcohol use and drug substance use as not currently. Her medical history reports as abnormal pap smear, stomach ulcer, anxiety, bilateral LE edema, HTN and interstitial cystitis. Her surgical history reports as cholecystectomy, tonsillectomy, EGD, hysteroscopy, cystoscopy, colonoscopy and gastric bypass (2010). On 6/2/22, Ms. Muriel Maxwell presented to [facility] ED with complaints of body swelling and a syncopal fall that she hit her head on the counter as she fell. Her hemoglobin in ED reported decreased to 7.1. ED reported hemoccult negative. She denied any bleeding and reports to not have menstrual cycles. Her albumin was decreased to 1.8 and her LFTs were elevated with an , AST of 51 and a total bilirubin of 2.3. Her renal function was normal, and her hepatitis screening reported non-reactive. Her repeat hemoglobin reported at 6.3 and she received 1 unit of PRBCs with a post transfusion hemoglobin reported at 7.5. Her 6/2/22 iron panel reported normal. She had an abdominal ultrasound due to elevated LFTs. The imaging reported mild hepatosplenomegaly and probable hepatic steatosis. Her 6/3/22 labs reported a decreased iron of 24, haptoglobin of <8 with an increased retic count of 3.7, absolute retic of 0.15712 and immature retic fraction of 30.4. She was reported as stable and was discharged home.   On 6/13/22 she presented to her PCP via telemedicine for follow up. She reported to not be eating well and was encouraged to increase her protein intake. A referral was placed to hematology to further evaluate her anemia and possible iron infusions. On 6/17/22 she saw her PCP for a rash over abdominal and groin. Her 6/17/22 CMP reported decreased total protein and albumin with elevated ALK and total bilirubin. She reports to be seeing GI on 7/13/22. She was to start oral iron supplement. On 6/22/22 she saw PCP for rash follow up. Rash remained therefore referral placed to dermatology.  Her 6/22/22 CBC reported an elevated WBC of 14.9, MCV of 100.4 and RDW of 28.9 with a decreased RBC of 2.68, hemoglobin of 7.9, hematocrit of 27 and MCHC of 29.3.     Labs       11/10/2020 12:32 6/2/2022 12:19 6/2/2022 17:14 6/3/2022 04:39 6/3/2022 18:44 6/22/2022 11:36   WBC 6.5 6.6   6.2   14.9 (H)   RBC 3.84 2.82 (L)   2.48 (L)   2.69 (L)   Hemoglobin Quant 12.1 7.1 (L)   6.3 (LL) 7.5 (L) 7.9 (L)   Hematocrit 36.5 23.6 (L)   21.1 (L) 24.8 (L) 27.0 (L)   MCV 95 83.7   85.1   100.4 (H)   MCH 31.5 25.2 (L)   25.4 (L)   29.4   MCHC 33.2 30.1 (L)   29.9 (L)   29.3 (L)   MPV   12.2   11.5   12.2   RDW 13.6 25.5 (H)   26.0 (H)   28.9 (H)   Platelet Count 086 847   184   218   Platelet Comment       ADEQUATE   ADEQUATE   Neutrophils % 40             Lymphocyte % 51             Absolute Mono #   0.5   0.6   0.7   Monocytes % 6             Eosinophils % 2 1   1   1   Basophils % 1             Neutrophils Absolute 2.6             Lymphocytes Absolute 3.3 (H)             Monocytes Absolute 0.4             Eosinophils Absolute 0.1             Basophils Absolute 0.0 0.0   0.0   0.0   Differential Type   AUTOMATED   AUTOMATED   AUTOMATED   Seg Neutrophils   51   47   62   GRANULOCYTE ABSOLUTE COUNT 0.0             Segs Absolute   3.3   2.8   9.4 (H)   Lymphocytes   40   42   31   Absolute Lymph #   2.6   2.6   4.6   Monocytes   8   9   5   Absolute Eos #   0.1   0.1   0.1   Basophils   0   0   0   Immature Granulocytes 0 1   1   1   Nucleated Red Blood Cells   0.00   0.00   0.00   WBC Comment       Result Confirmed By Smear   RARE   RETIC HGB CONC.   31           Absolute Immature Granulocyte   0.0   0.1   0.1   RBC Comment       SLIGHT. ..   MODERATE. .. Ferritin     12         Iron     41 24 (L)       TIBC     274         FOLATE, FOLAT     12. 8         Vitamin B-12     891         Haptoglobin       <8 (L)       IMMATURE RETIC FRACTION   30.4 (H)           Absolute Retic #   0.1045 (H)                11/10/2020 12:32 6/2/2022 12:19 6/2/2022 17:14 6/3/2022 04:39 6/17/2022 11:35   Sodium 143 143   146 (H) 144   Potassium 3.9 4.2   2.8 (L) 3.8   Chloride 107 (H) 110 (H)   108 (H) 108 (H)   CO2 25 26   27 27   BUN 17 6   5 (L) 5 (L)   Creatinine 0.49 (L) 0.60   0.50 (L) 0.70   Bun/Cre Ratio 35 (H)           Anion Gap   7   11 9   GFR Non-   >60   >60 >60   EGFR IF NonAfrican American 134           FOLATE     12. 8       GFR  154 >60   >60 >60   Magnesium       1.9     GLUCOSE 74 87   79 107 (H)   CALCIUM 8.0 (L) 7.5 (L)   7.5 (L) 8.1 (L)   ALBUMIN/GLOBULIN RATIO   0.5 (L)   0.7 (L) 0.8 (L)   Total Protein 5.2 (L) 5.3 (L)   4.9 (L) 5.3 (L)   TRANSFERRIN SATURATION     15       Vitamin B-12     891       Albumin 2.8 (L) 1.8 (L)   2.0 (L) 2.3 (L)   Globulin   3.5   2.9 3.0   Albumin/Globulin Ratio 1.2           Alk Phos 116           Alk Phosphatase   187 (H)   158 (H) 161 (H)   ALT 25 17   12 18   AST 25 51 (H)   17 23   Bilirubin 0.5 2.3 (H)   2.0 (H) 5.1 (H)   Prealbumin       3.3 (L)          6/2/2022 14:43   Hep A IgM NONREACTIVE   Hepatitis B Surface Ag NONREACTIVE   Hepatitis C Ab NONREACTIVE   Hep B Core Ab, IgM NONREACTIVE      6/2/22 Abdominal Ultrasound      FINDINGS: Multiple images from real time ultrasound evaluation of the abdomen   show that the gallbladder is surgically absent.  The common bile duct is normal   in caliber at 5 mm, and no significant intrahepatic bilary ductal dilatation is   evident.  Diffuse hepatic parenchymal hyperechogenicity with sonic attenuation   is most commonly associated with fatty infiltration.  The liver is mildly   enlarged at 21 cm.  Diffuse hepatic parenchymal hyperechogenicity with sonic   attenuation is most commonly associated with fatty infiltration. No focal liver   lesion is seen.  The spleen is mildly enlarged at 14.2 cm.  The pancreas,   kidneys, aorta, and IVC appear unremarkable as imaged.           Impression       1.  Mild hepatosplenomegaly and probable hepatic steatosis no definite focal   liver mass identified. 2.  No biliary ductal dilatation status post cholecystectomy.         Notes from Referring Provider: n/a     Other Pertinent Information: She saw pain management in the past for chronic pain however was discharged in 9/2018 due to breaking their contract with use of narcotics and THC. HPI: history of gastric bypass 2010  No prior history of anemia or iron infusion  Fell early June/lat may  Has dentures, rotting out    No multiple injuries or broken bones    No etoh  No drugs, met  Smoke 1 pack a day    occ marijauna  No bulemia or anorexia    Poor healing and or scar issues    Significant skin issues  Derm appt.  Tomorrow      Patient Denies:   Fevers   Night Sweats   Chills   Weight Loss   Bone Pain   Lymphadenopathy  Patient Denies:  Nose bleeds  Gum bleeds  Bruising or petechia  Bleeding with surgery  Bleeding with accidents  Transfusions  History or free bleeding or hemophilia    Current Outpatient Medications   Medication Sig    Fe Fum-FePoly-FA-Vit C-Vit B3 (INTEGRA F PO) Take by mouth    Potassium Bicarbonate (EFFER-K PO) Take by mouth    cephALEXin (KEFLEX) 500 MG capsule Take 1 capsule by mouth 3 times daily for 7 days    furosemide (LASIX) 20 MG tablet Take 1 tablet by mouth daily    DULoxetine (CYMBALTA) 30 MG extended release capsule Take 1 capsule by mouth daily    metoprolol tartrate (LOPRESSOR) 25 MG tablet Take 1 tablet by mouth 2 times daily    Multiple Vitamins-Minerals (THERAPEUTIC MULTIVITAMIN-MINERALS) tablet Take 1 tablet by mouth daily (Patient not taking: Reported on 6/28/2022)     No current facility-administered medications for this visit. Past Medical History:   Diagnosis Date    Anxiety     Bilateral leg edema 2020    if she doesn't eat enough protein    History of blood transfusion 06/02/2022    Hypertension 02/09/2021    Interstitial cystitis 2017       Past Surgical History:   Procedure Laterality Date    CHOLECYSTECTOMY  2012    GASTRIC BYPASS SURGERY  2010    was 283 lbs    TONSILLECTOMY      WRIST SURGERY         Family History   Problem Relation Age of Onset    Other Father         doesn't have contact with    Ovarian Cancer Mother 39   Nellie Fishers Other Mother         fibromyalgia    Rheum Arthritis Mother        Social History     Tobacco Use    Smoking status: Current Every Day Smoker     Packs/day: 0.50    Smokeless tobacco: Never Used    Tobacco comment: Quit smoking: started age 14   Substance Use Topics    Alcohol use: Not Currently    Drug use: Not Currently         There is no immunization history on file for this patient. Allergies   Allergen Reactions    Lamictal [Lamotrigine] Rash         Review of Systems   Review of Systems   Constitutional: Negative. HENT: Negative. Eyes: Negative. Respiratory: Negative. Cardiovascular: Negative. Gastrointestinal: Negative. Genitourinary: Negative. Musculoskeletal: Negative. Skin: Negative. Neurological: Negative. Endo/Heme/Allergies: Negative. Psychiatric/Behavioral: Negative. Pain reviewed fully and addressed this visit  Med review and reconciliation addressed fully this visit  ADLs and performance level addressed, ECOG 0 unless otherwise addressed    Blood pressure 111/60, pulse (!) 101, temperature 97.9 °F (36.6 °C), temperature source Oral, resp.  rate 16, confirm with Ictotest.    Blood, Urine 06/28/2022 TRACE* NEG   Final    Urobilinogen, Urine 06/28/2022 >=8.0  0.2 - 1.0 EU/dL Final    Nitrite, Urine 06/28/2022 Negative  NEG   Final    Leukocyte Esterase, Urine 06/28/2022 Negative  NEG   Final    Folate 06/28/2022 8.6  3.1 - 17.5 ng/mL Final    Vitamin B-12 06/28/2022 1836* 193 - 986 pg/mL Final    Uric Acid 06/28/2022 6.6* 2.6 - 6.0 MG/DL Final    LD 06/28/2022 204* 100 - 190 U/L Final    Phosphorus 06/28/2022 2.6  2.5 - 4.5 MG/DL Final    Sed Rate, Automated 06/28/2022 20  0 - 20 mm/hr Final    TSH, 3RD GENERATION 06/28/2022 1.450  0.358 - 3 uIU/mL Final    Ferritin 06/28/2022 74  8 - 388 NG/ML Final    Iron 06/28/2022 115  35 - 150 ug/dL Final    Comment: Known Interfering Substances section:  \"Iron values may be falsely elevated in  serum samples from patients with  anticoagulants (e.g., hemodialysis patients). \"  Limitations of Procedure section:  \"Turbidity resulting from precipitation of  fibrinogen in the serum of patients treated  with anticoagulants (e.g. hemodialysis  patients) may cause spuriously elevated  iron results. \"      TIBC 06/28/2022 137* 250 - 450 ug/dL Final    TRANSFERRIN SATURATION 06/28/2022 84  >20 % Final    Reticulocyte Count,Automated 06/28/2022 5.1* 0.3 - 2.0 % Final    Absolute Retic # 06/28/2022 0.1165* 0.026 - 0.095 M/ul Final    Immature Retic Fraction 06/28/2022 32.1* 3.0 - 15.9 % Final    Retic Hemoglobin conc. 06/28/2022 39* 29 - 35 pg Final    Sodium 06/28/2022 145  136 - 145 mmol/L Final    Potassium 06/28/2022 3.3* 3.5 - 5.1 mmol/L Final    Chloride 06/28/2022 108* 98 - 107 mmol/L Final    CO2 06/28/2022 27  21 - 32 mmol/L Final    Anion Gap 06/28/2022 10  7 - 16 mmol/L Final    Glucose 06/28/2022 83  65 - 100 mg/dL Final    BUN 06/28/2022 7  6 - 23 MG/DL Final    CREATININE 06/28/2022 0.60  0.6 - 1.0 MG/DL Final    GFR  06/28/2022 >60  >60 ml/min/1.73m2 Final    GFR Non-African American 06/28/2022 >60  >60 ml/min/1.73m2 Final    Comment:      Estimated GFR is calculated using the Modification of Diet in Renal Disease (MDRD) Study equation, reported for both  Americans (GFRAA) and non- Americans (GFRNA), and normalized to 1.73m2 body surface area. The physician must decide which value applies to the patient. The MDRD study equation should only be used in individuals age 25 or older. It has not been validated for the following: pregnant women, patients with serious comorbid conditions,or on certain medications, or persons with extremes of body size, muscle mass, or nutritional status.       Calcium 06/28/2022 7.4* 8.3 - 10.4 MG/DL Final    Total Bilirubin 06/28/2022 9.7* 0.2 - 1.1 MG/DL Final    ALT 06/28/2022 33  12 - 65 U/L Final    AST 06/28/2022 49* 15 - 37 U/L Final    Alk Phosphatase 06/28/2022 132  50 - 136 U/L Final    Total Protein 06/28/2022 5.2* 6.3 - 8.2 g/dL Final    Albumin 06/28/2022 1.9* 3.5 - 5.0 g/dL Final    Globulin 06/28/2022 3.3  2.3 - 3.5 g/dL Final    Albumin/Globulin Ratio 06/28/2022 0.6* 1.2 - 3.5   Final    WBC 06/28/2022 15.4* 4.3 - 11.1 K/uL Final    Comment: RESULTS CHECKED X 2  PERIPHERAL REVIEW TO FOLLOW      RBC 06/28/2022 2.28* 4.05 - 5.2 M/uL Final    Hemoglobin 06/28/2022 7.1* 11.7 - 15.4 g/dL Final    Hematocrit 06/28/2022 23.4* 35.8 - 46.3 % Final    MCV 06/28/2022 102.6* 79.6 - 97.8 FL Final    MCH 06/28/2022 31.1  26.1 - 32.9 PG Final    MCHC 06/28/2022 30.3* 31.4 - 35.0 g/dL Final    RDW 06/28/2022 28.7* 11.9 - 14.6 % Final    Platelets 30/35/6319 220  150 - 450 K/uL Final    MPV 06/28/2022 12.0  9.4 - 12.3 FL Final    nRBC 06/28/2022 0.00  0.0 - 0.2 K/uL Final    **Note: Absolute NRBC parameter is now reported with Hemogram**    Seg Neutrophils 06/28/2022 68  43 - 78 % Final    Lymphocytes 06/28/2022 24  13 - 44 % Final    Monocytes 06/28/2022 6  4.0 - 12.0 % Final    Eosinophils % 06/28/2022 1  0.5 - 7.8 % Final  Basophils 06/28/2022 0  0.0 - 2.0 % Final    Immature Granulocytes 06/28/2022 1  0.0 - 5.0 % Final    Segs Absolute 06/28/2022 10.4* 1.7 - 8.2 K/UL Final    Absolute Lymph # 06/28/2022 3.7  0.5 - 4.6 K/UL Final    Absolute Mono # 06/28/2022 0.9  0.1 - 1.3 K/UL Final    Absolute Eos # 06/28/2022 0.2  0.0 - 0.8 K/UL Final    Basophils Absolute 06/28/2022 0.0  0.0 - 0.2 K/UL Final    Absolute Immature Granulocyte 06/28/2022 0.2  0.0 - 0.5 K/UL Final    RBC Comment 06/28/2022     Final                    Value:MARKED  ANISOCYTOSIS + POIKILOCYTOSIS      RBC Comment 06/28/2022     Final                    Value:OCCASIONAL  OVALOCYTES      RBC Comment 06/28/2022     Final                    Value:OCCASIONAL  TARGET CELLS      RBC Comment 06/28/2022     Final                    Value:OCCASIONAL  SCHISTOCYTES      RBC Comment 06/28/2022     Final                    Value:MODERATE  HYPOCHROMIA      RBC Comment 06/28/2022     Final                    Value:MODERATE  POLYCHROMASIA      RBC Comment 06/28/2022 Dimorphic RBCs    Final    WBC Comment 06/28/2022 Result Confirmed By Smear    Final    Comment: OCCASIONAL  ATYPICAL LYMPHOCYTES PRESENT      Platelet Comment 25/12/6226 ADEQUATE    Final    Comment: OCCASIONAL  LARGE FORMS PRESENT      Differential Type 06/28/2022 AUTOMATED    Final    CEA 06/28/2022 6.0* 0.0 - 3.0 ng/mL Final    Comment: Nonsmoker:  <3.0 ng/mL  Smoker:     <5.0 ng/mL  [employer]. Patient's results of tumor marker testing may not be comparable to labs using different manufacturers/methods.       Hep A IgM 06/28/2022 NONREACTIVE  NR   Final    Hep B Core Ab, IgM 06/28/2022 NONREACTIVE  NR   Final    Hepatitis B Surface Ag 06/28/2022 NONREACTIVE  NR   Final    Hepatitis C Ab 06/28/2022 NONREACTIVE  NR   Final    HIV 1/2 Interp 06/28/2022 NONREACTIVE  NR   Final    HIV 1/2 Result Comment 06/28/2022 SEE NOTE    Final    Comment: While this assay is highly sensitive, a non-reactive/negative result for HIV antibodies HIV-1 and HIV-2 and p24 antigen, does not exclude the possibility of exposure to or infection with HIV. HIV antibodies and/or p24 antigen may be undetectable in some stages of the infection and in some clinical conditions. Test performed by the [employer] HIV Ag/Ab Combo (CHIV), 4th generation assay. Recommend consulting relevant CDC guidelines for informing test subject of the result and its interpretation.  CRP 06/28/2022 4.0* 0.0 - 0.9 mg/dL Final    WBC, UA 06/28/2022 0-3  0 /hpf Final    RBC, UA 06/28/2022 0-3  0 /hpf Final    Epithelial Cells UA 06/28/2022 3-5  0 /hpf Final    BACTERIA, URINE 06/28/2022 2+* 0 /hpf Final    Casts 06/28/2022 HYALINE  0 /lpf Final    0-3    Crystals 06/28/2022 0  0 /LPF Final    Mucus, UA 06/28/2022 3+* 0 /lpf Final    OTHER OBSERVATIONS 06/28/2022 Interfering substances due to urine color may affect results of dipstick chemistries. Final    Haptoglobin 06/28/2022 <8* 30 - 200 mg/dL Final     Results for Birdie Andrews" (MRN [id number]) as of 6/28/2022 13:58   Ref.  Range 11/10/2020 12:32   WBC Latest Ref Range: 3.4 - 10.8 x10E3/uL 6.5   RBC Latest Ref Range: 3.77 - 5.28 x10E6/uL 3.84   Hemoglobin Quant Latest Ref Range: 11.1 - 15.9 g/dL 12.1   Hematocrit Latest Ref Range: 34.0 - 46.6 % 36.5   MCV Latest Ref Range: 79 - 97 fL 95   MCH Latest Ref Range: 26.6 - 33.0 pg 31.5   MCHC Latest Ref Range: 31.5 - 35.7 g/dL 33.2   RDW Latest Ref Range: 11.7 - 15.4 % 13.6   Platelet Count Latest Ref Range: 150 - 450 x10E3/uL 178   Neutrophils % Latest Ref Range: Not Estab. % 40   Lymphocyte % Latest Ref Range: Not Estab. % 51   Monocytes % Latest Ref Range: Not Estab. % 6   Eosinophils % Latest Ref Range: Not Estab. % 2   Basophils % Latest Ref Range: Not Estab. % 1   Neutrophils Absolute Latest Ref Range: 1.4 - 7.0 x10E3/uL 2.6   Lymphocytes Absolute Latest Ref Range: 0.7 - 3.1 x10E3/uL 3.3 (H) Monocytes Absolute Latest Ref Range: 0.1 - 0.9 x10E3/uL 0.4   Eosinophils Absolute Latest Ref Range: 0.0 - 0.4 x10E3/uL 0.1   Basophils Absolute Latest Ref Range: 0.0 - 0.2 x10E3/uL 0.0   GRANULOCYTE ABSOLUTE COUNT Latest Ref Range: 0.0 - 0.1 x10E3/uL 0.0   Immature Granulocytes Latest Ref Range: Not Estab. % 0       Radiology:  CT Results (most recent):  No results found for this or any previous visit from the past 365 days. PET Results (most recent):  No results found for this or any previous visit from the past 365 days. AGUILAR Results (most recent):  No results found for this or any previous visit from the past 365 days. US  No results found for this or any previous visit from the past 365 days. Patient Active Problem List   Diagnosis    Status post gastric bypass for obesity    Essential hypertension    Hypoalbuminemia    Normocytic anemia    Bilateral leg edema    Elevated bilirubin    Acute hypokalemia    Macrocytic anemia         ASSESSMENT and PLAN  Hyun is a 35 yo with progressive macrocytic anemia, purpuric rash and fatigue s/p bariatric surgery remote and has significant target cells and elevated retic, bili and anemia c/w hemolysis, ? Immune versus non immune.     1) Hemolysis: direct larissa and hgb electrophoresis pending  Past labs c/w normal MCV and MCHC suggest no hereditary LCAT or hgb C  -haptoglobin <8 c/w hemolysis  Likely immune mediated  -CT CAP to assess liver and lymphoproliferative disease  -BMA/Bx under IR/sedation  -transfuse 1 unt PRBCs, if cold AIHA, then use blood warmer  -follow up 1-2 weeks  2) Anemia  -transfuse as above  -full nutritional work up   -zinc copper methyl malonic acid  3) purpuric rash  -? HSP or possible lymphoproliferative component  -derm biopsy pending, sees tomorrow  -on keflex per PCP, agree with empiric tx  Reassurance and continue eval  Total time 70 min 50% in direct consultation about the patient's diagnosis and management  Cherise ENRIQUEZ

## 2022-06-28 NOTE — PATIENT INSTRUCTIONS
Patient Instructions from Today's Visit    Reason for Visit:  New Patient Visit    Plan:  -You have some deficiencies that seem to be causing the anemia. The iron stores look normal at this time. This has been consistent with Macrocytic Anemia    -We do have a few tests we want to set you up for - additional labs, B12 injection, unit of blood, bone marrow biopsy    Follow Up: Follow up with Dr. Angel Cos Visit on 06/28/2022   Component Date Value Ref Range Status    Color, UA 06/28/2022 BROWN    Final    Appearance 06/28/2022 CLEAR    Final    Specific [place], UA 06/28/2022 1.020  1.001 - 1.023   Final    pH, Urine 06/28/2022 5.5  5.0 - 9.0   Final    Protein, UA 06/28/2022 30* NEG mg/dL Final    Glucose, UA 06/28/2022 Negative  NEG mg/dL Final    Ketones, Urine 06/28/2022 15* NEG mg/dL Final    Bilirubin Urine 06/28/2022 LARGE* NEG   Final    Positive, unable to confirm with Ictotest.    Blood, Urine 06/28/2022 TRACE* NEG   Final    Urobilinogen, Urine 06/28/2022 >=8.0  0.2 - 1.0 EU/dL Final    Nitrite, Urine 06/28/2022 Negative  NEG   Final    Leukocyte Esterase, Urine 06/28/2022 Negative  NEG   Final    Uric Acid 06/28/2022 6.6* 2.6 - 6.0 MG/DL Final    LD 06/28/2022 204* 100 - 190 U/L Final    Phosphorus 06/28/2022 2.6  2.5 - 4.5 MG/DL Final    Ferritin 06/28/2022 74  8 - 388 NG/ML Final    Iron 06/28/2022 115  35 - 150 ug/dL Final    Comment: Known Interfering Substances section:  \"Iron values may be falsely elevated in  serum samples from patients with  anticoagulants (e.g., hemodialysis patients). \"  Limitations of Procedure section:  \"Turbidity resulting from precipitation of  fibrinogen in the serum of patients treated  with anticoagulants (e.g. hemodialysis  patients) may cause spuriously elevated  iron results. \"      TIBC 06/28/2022 137* 250 - 450 ug/dL Final    TRANSFERRIN SATURATION 06/28/2022 84  >20 % Final    Reticulocyte Count,Automated 06/28/2022 5.1* 0.3 - 2.0 % Final    Absolute Retic # 06/28/2022 0.1165* 0.026 - 0.095 M/ul Final    Immature Retic Fraction 06/28/2022 32.1* 3.0 - 15.9 % Final    Retic Hemoglobin conc. 06/28/2022 39* 29 - 35 pg Final    Sodium 06/28/2022 145  136 - 145 mmol/L Final    Potassium 06/28/2022 3.3* 3.5 - 5.1 mmol/L Final    Chloride 06/28/2022 108* 98 - 107 mmol/L Final    CO2 06/28/2022 27  21 - 32 mmol/L Final    Anion Gap 06/28/2022 10  7 - 16 mmol/L Final    Glucose 06/28/2022 83  65 - 100 mg/dL Final    BUN 06/28/2022 7  6 - 23 MG/DL Final    CREATININE 06/28/2022 0.60  0.6 - 1.0 MG/DL Final    GFR  06/28/2022 >60  >60 ml/min/1.73m2 Final    GFR Non- 06/28/2022 >60  >60 ml/min/1.73m2 Final    Comment:      Estimated GFR is calculated using the Modification of Diet in Renal Disease (MDRD) Study equation, reported for both  Americans (GFRAA) and non- Americans (GFRNA), and normalized to 1.73m2 body surface area. The physician must decide which value applies to the patient. The MDRD study equation should only be used in individuals age 25 or older. It has not been validated for the following: pregnant women, patients with serious comorbid conditions,or on certain medications, or persons with extremes of body size, muscle mass, or nutritional status.       Calcium 06/28/2022 7.4* 8.3 - 10.4 MG/DL Final    Total Bilirubin 06/28/2022 9.7* 0.2 - 1.1 MG/DL Final    ALT 06/28/2022 33  12 - 65 U/L Final    AST 06/28/2022 49* 15 - 37 U/L Final    Alk Phosphatase 06/28/2022 132  50 - 136 U/L Final    Total Protein 06/28/2022 5.2* 6.3 - 8.2 g/dL Final    Albumin 06/28/2022 1.9* 3.5 - 5.0 g/dL Final    Globulin 06/28/2022 3.3  2.3 - 3.5 g/dL Final    Albumin/Globulin Ratio 06/28/2022 0.6* 1.2 - 3.5   Final    WBC 06/28/2022 15.4* 4.3 - 11.1 K/uL Final    Comment: RESULTS CHECKED X 2  PERIPHERAL REVIEW TO FOLLOW      RBC 06/28/2022 2.28* 4.05 - 5.2 M/uL Final    Hemoglobin 06/28/2022 7.1* 11.7 - 15.4 g/dL Final    Hematocrit 06/28/2022 23.4* 35.8 - 46.3 % Final    MCV 06/28/2022 102.6* 79.6 - 97.8 FL Final    MCH 06/28/2022 31.1  26.1 - 32.9 PG Final    MCHC 06/28/2022 30.3* 31.4 - 35.0 g/dL Final    RDW 06/28/2022 28.7* 11.9 - 14.6 % Final    Platelets 37/65/5359 220  150 - 450 K/uL Final    MPV 06/28/2022 12.0  9.4 - 12.3 FL Final    nRBC 06/28/2022 0.00  0.0 - 0.2 K/uL Final    **Note: Absolute NRBC parameter is now reported with Hemogram**    Differential Type 06/28/2022 PENDING   Incomplete    CEA 06/28/2022 6.0* 0.0 - 3.0 ng/mL Final    Comment: Nonsmoker:  <3.0 ng/mL  Smoker:     <5.0 ng/mL  [employer]. Patient's results of tumor marker testing may not be comparable to labs using different manufacturers/methods.  CRP 06/28/2022 4.0* 0.0 - 0.9 mg/dL Final    WBC, UA 06/28/2022 0-3  0 /hpf Final    RBC, UA 06/28/2022 0-3  0 /hpf Final    Epithelial Cells UA 06/28/2022 3-5  0 /hpf Final    BACTERIA, URINE 06/28/2022 2+* 0 /hpf Final    Casts 06/28/2022 HYALINE  0 /lpf Final    0-3    Crystals 06/28/2022 0  0 /LPF Final    Mucus, UA 06/28/2022 3+* 0 /lpf Final    OTHER OBSERVATIONS 06/28/2022 Interfering substances due to urine color may affect results of dipstick chemistries. Final           Treatment Summary has been discussed and given to patient: n/a        -------------------------------------------------------------------------------------------------------------------  Please call our office at [phone] if you have any  of the following symptoms:   · Fever of 100.5 or greater  · Chills  · Shortness of breath  · Swelling or pain in one leg    After office hours an answering service is available and will contact a provider for emergencies or if you are experiencing any of the above symptoms.  Patient did express an interest in My Chart.   My Chart log in information explained on the after visit summary printout at the [facility] [address] desk.

## 2022-06-29 ENCOUNTER — HOSPITAL ENCOUNTER (OUTPATIENT)
Dept: INFUSION THERAPY | Age: 32
Discharge: HOME | End: 2022-06-29
Payer: COMMERCIAL

## 2022-06-29 ENCOUNTER — APPOINTMENT (RX ONLY)
Dept: URBAN - METROPOLITAN AREA CLINIC 329 | Facility: CLINIC | Age: 32
Setting detail: DERMATOLOGY
End: 2022-06-29

## 2022-06-29 VITALS
TEMPERATURE: 97.9 F | RESPIRATION RATE: 18 BRPM | SYSTOLIC BLOOD PRESSURE: 123 MMHG | OXYGEN SATURATION: 99 % | DIASTOLIC BLOOD PRESSURE: 66 MMHG

## 2022-06-29 DIAGNOSIS — R17 UNSPECIFIED JAUNDICE: ICD-10-CM

## 2022-06-29 DIAGNOSIS — L85.3 XEROSIS CUTIS: ICD-10-CM

## 2022-06-29 DIAGNOSIS — D53.9 MACROCYTIC ANEMIA: Primary | ICD-10-CM

## 2022-06-29 DIAGNOSIS — D485 NEOPLASM OF UNCERTAIN BEHAVIOR OF SKIN: ICD-10-CM

## 2022-06-29 DIAGNOSIS — L30.9 DERMATITIS, UNSPECIFIED: ICD-10-CM

## 2022-06-29 DIAGNOSIS — R60.0 LOCALIZED EDEMA: ICD-10-CM

## 2022-06-29 DIAGNOSIS — I89.0 LYMPHEDEMA, NOT ELSEWHERE CLASSIFIED: ICD-10-CM

## 2022-06-29 PROBLEM — D48.5 NEOPLASM OF UNCERTAIN BEHAVIOR OF SKIN: Status: ACTIVE | Noted: 2022-06-29

## 2022-06-29 LAB
APTT SCREEN TO CONFIRM RATIO: 1.13 RATIO (ref 0–1.34)
B2 GLYCOPROT1 IGA SER-ACNC: 13 GPI IGA UNITS (ref 0–25)
B2 GLYCOPROT1 IGG SER-ACNC: <9 GPI IGG UNITS (ref 0–20)
B2 GLYCOPROT1 IGM SER-ACNC: 12 GPI IGM UNITS (ref 0–32)
C-ANCA TITR SER IF: NORMAL TITER
CA TITR SERPL AGGL: NEGATIVE {TITER}
CMV IGG SERPL IA-ACNC: 9.9 U/ML (ref 0–0.59)
CMV IGM SERPL IA-ACNC: <30 AU/ML (ref 0–29.9)
CONFIRM APTT/NORMAL: 57.1 SEC (ref 0–47.6)
COPPER SERPL-MCNC: 50 UG/DL (ref 80–158)
DRVVT RATIO: 1.4 RATIO (ref 0.8–1.2)
HEXAGONAL PHASE PHOSPHOLIPID: 3 SEC (ref 0–11)
IGA SERPL-MCNC: 369 MG/DL (ref 87–352)
IGG SERPL-MCNC: 1197 MG/DL (ref 586–1602)
IGM SERPL-MCNC: 224 MG/DL (ref 26–217)
LA 2 SCREEN W REFLEX-IMP: ABNORMAL
MIXING DRVVT: 54.1 SEC (ref 0–40.4)
MYELOPEROXIDASE AB SER IA-ACNC: <0.2 UNITS (ref 0–0.9)
P-ANCA ATYPICAL TITR SER IF: NORMAL TITER
P-ANCA TITR SER IF: NORMAL TITER
PROTEINASE3 AB SER IA-ACNC: <0.2 UNITS (ref 0–0.9)
PTT-LA MIX: 49.7 SEC (ref 0–48.9)
SCREEN APTT: 57.3 SEC (ref 0–51.9)
SCREEN DRVVT: 80.1 SEC (ref 0–47)
THROMBIN TIME: 27.6 SEC (ref 0–23)
ZINC SERPL-MCNC: 45 UG/DL (ref 44–115)

## 2022-06-29 PROCEDURE — 11105 PUNCH BX SKIN EA SEP/ADDL: CPT

## 2022-06-29 PROCEDURE — ? RECOMMENDATIONS

## 2022-06-29 PROCEDURE — P9016 RBC LEUKOCYTES REDUCED: HCPCS

## 2022-06-29 PROCEDURE — 11104 PUNCH BX SKIN SINGLE LESION: CPT

## 2022-06-29 PROCEDURE — 6370000000 HC RX 637 (ALT 250 FOR IP): Performed by: PEDIATRICS

## 2022-06-29 PROCEDURE — ? BIOPSY BY PUNCH METHOD

## 2022-06-29 PROCEDURE — 99203 OFFICE O/P NEW LOW 30 MIN: CPT | Mod: 25

## 2022-06-29 PROCEDURE — ? PRESCRIPTION

## 2022-06-29 PROCEDURE — 2580000003 HC RX 258: Performed by: PEDIATRICS

## 2022-06-29 PROCEDURE — ? ADDITIONAL NOTES

## 2022-06-29 PROCEDURE — ? FULL BODY SKIN EXAM

## 2022-06-29 PROCEDURE — ? COUNSELING

## 2022-06-29 PROCEDURE — 36430 TRANSFUSION BLD/BLD COMPNT: CPT

## 2022-06-29 PROCEDURE — ? OTHER

## 2022-06-29 RX ORDER — SODIUM CHLORIDE 9 MG/ML
25 INJECTION, SOLUTION INTRAVENOUS PRN
Status: DISCONTINUED | OUTPATIENT
Start: 2022-06-29 | End: 2022-06-30 | Stop reason: HOSPADM

## 2022-06-29 RX ORDER — SODIUM CHLORIDE 9 MG/ML
20 INJECTION, SOLUTION INTRAVENOUS CONTINUOUS
Status: DISCONTINUED | OUTPATIENT
Start: 2022-06-29 | End: 2022-06-30 | Stop reason: HOSPADM

## 2022-06-29 RX ORDER — SODIUM CHLORIDE 0.9 % (FLUSH) 0.9 %
5-40 SYRINGE (ML) INJECTION PRN
Status: DISCONTINUED | OUTPATIENT
Start: 2022-06-29 | End: 2022-06-30 | Stop reason: HOSPADM

## 2022-06-29 RX ORDER — TRIAMCINOLONE ACETONIDE 1 MG/G
CREAM TOPICAL
Qty: 454 | Refills: 1 | Status: ERX | COMMUNITY
Start: 2022-06-29

## 2022-06-29 RX ORDER — DIPHENHYDRAMINE HCL 25 MG
25 CAPSULE ORAL ONCE
Status: COMPLETED | OUTPATIENT
Start: 2022-06-29 | End: 2022-06-29

## 2022-06-29 RX ORDER — ACETAMINOPHEN 325 MG/1
650 TABLET ORAL ONCE
Status: COMPLETED | OUTPATIENT
Start: 2022-06-29 | End: 2022-06-29

## 2022-06-29 RX ADMIN — SODIUM CHLORIDE 20 ML/HR: 900 INJECTION, SOLUTION INTRAVENOUS at 08:10

## 2022-06-29 RX ADMIN — SODIUM CHLORIDE, PRESERVATIVE FREE 10 ML: 5 INJECTION INTRAVENOUS at 08:10

## 2022-06-29 RX ADMIN — DIPHENHYDRAMINE HYDROCHLORIDE 25 MG: 25 CAPSULE ORAL at 07:54

## 2022-06-29 RX ADMIN — TRIAMCINOLONE ACETONIDE: 1 CREAM TOPICAL at 00:00

## 2022-06-29 RX ADMIN — ACETAMINOPHEN 650 MG: 325 TABLET ORAL at 07:53

## 2022-06-29 ASSESSMENT — LOCATION DETAILED DESCRIPTION DERM
LOCATION DETAILED: LEFT PROXIMAL PRETIBIAL REGION
LOCATION DETAILED: RIGHT BUTTOCK
LOCATION DETAILED: LEFT VENTRAL DISTAL FOREARM
LOCATION DETAILED: LEFT VENTRAL PROXIMAL FOREARM
LOCATION DETAILED: RIGHT PROXIMAL PRETIBIAL REGION
LOCATION DETAILED: RIGHT PROXIMAL PRETIBIAL REGION
LOCATION DETAILED: LEFT PROXIMAL PRETIBIAL REGION
LOCATION DETAILED: LEFT INFERIOR MEDIAL MIDBACK
LOCATION DETAILED: LEFT BUTTOCK
LOCATION DETAILED: RIGHT ANTECUBITAL SKIN
LOCATION DETAILED: EPIGASTRIC SKIN
LOCATION DETAILED: RIGHT BUTTOCK
LOCATION DETAILED: EPIGASTRIC SKIN
LOCATION DETAILED: PERIUMBILICAL SKIN
LOCATION DETAILED: RIGHT VENTRAL PROXIMAL FOREARM
LOCATION DETAILED: LEFT BUTTOCK
LOCATION DETAILED: LEFT DISTAL PRETIBIAL REGION
LOCATION DETAILED: SUPERIOR LUMBAR SPINE
LOCATION DETAILED: LEFT VENTRAL DISTAL FOREARM

## 2022-06-29 ASSESSMENT — LOCATION ZONE DERM
LOCATION ZONE: TRUNK
LOCATION ZONE: LEG
LOCATION ZONE: ARM
LOCATION ZONE: ARM
LOCATION ZONE: LEG
LOCATION ZONE: TRUNK

## 2022-06-29 ASSESSMENT — LOCATION SIMPLE DESCRIPTION DERM
LOCATION SIMPLE: RIGHT BUTTOCK
LOCATION SIMPLE: ABDOMEN
LOCATION SIMPLE: RIGHT FOREARM
LOCATION SIMPLE: RIGHT UPPER ARM
LOCATION SIMPLE: RIGHT PRETIBIAL REGION
LOCATION SIMPLE: LOWER BACK
LOCATION SIMPLE: LEFT LOWER BACK
LOCATION SIMPLE: ABDOMEN
LOCATION SIMPLE: LEFT FOREARM
LOCATION SIMPLE: LEFT PRETIBIAL REGION
LOCATION SIMPLE: LEFT PRETIBIAL REGION
LOCATION SIMPLE: RIGHT BUTTOCK
LOCATION SIMPLE: LEFT FOREARM
LOCATION SIMPLE: LEFT BUTTOCK
LOCATION SIMPLE: RIGHT PRETIBIAL REGION
LOCATION SIMPLE: LEFT BUTTOCK

## 2022-06-29 ASSESSMENT — BSA RASH: BSA RASH: 4

## 2022-06-29 ASSESSMENT — PAIN INTENSITY VAS: HOW INTENSE IS YOUR PAIN 0 BEING NO PAIN, 10 BEING THE MOST SEVERE PAIN POSSIBLE?: 1/10 PAIN

## 2022-06-29 NOTE — PROCEDURE: OTHER
Other (Free Text): Should have bones marrow biopsy
Detail Level: Zone
Note Text (......Xxx Chief Complaint.): This diagnosis correlates with the
Render Risk Assessment In Note?: no

## 2022-06-29 NOTE — PROGRESS NOTES
Arrived to the [facility]. One unit of blood completed. Patient tolerated without difficulty. Any issues or concerns during appointment: None. Patient aware of next lab and [facility] office visit on July 12 (date) at [address] (time). Patient instructed to call provider with temperature of 100.4 or greater or nausea/vomiting/ diarrhea or pain not controlled by medications  Discharged via wheelchair accompanied by mother.

## 2022-06-29 NOTE — PROCEDURE: BIOPSY BY PUNCH METHOD
Detail Level: Detailed
Was A Bandage Applied: Yes
Punch Size In Mm: 4
Biopsy Type: H and E
Anesthesia Type: 1% lidocaine with epinephrine
Anesthesia Volume In Cc (Will Not Render If 0): 0.5
Additional Anesthesia Volume In Cc (Will Not Render If 0): 0
Hemostasis: None
Epidermal Sutures: 4-0 Ethilon
Wound Care: Petrolatum
Dressing: bandage
Suture Removal: 14 days
Patient Will Remove Sutures At Home?: No
Lab: 6
Lab Facility: 3
Consent was obtained and risks were reviewed including but not limited to scarring, infection, bleeding, scabbing, incomplete removal, nerve damage and allergy to anesthesia.
Post-Care Instructions: I reviewed with the patient in detail post-care instructions. Patient is to keep the biopsy site dry overnight, and then apply bacitracin twice daily until healed.
Home Suture Removal Text: Patient was provided a home suture removal kit and will remove their sutures at home.  If they have any questions or difficulties they will call the office.
Notification Instructions: Patient will be notified of biopsy results. However, patient instructed to call the office if not contacted within 2 weeks.
Billing Type: Third-Party Bill
Information: Selecting Yes will display possible errors in your note based on the variables you have selected. This validation is only offered as a suggestion for you. PLEASE NOTE THAT THE VALIDATION TEXT WILL BE REMOVED WHEN YOU FINALIZE YOUR NOTE. IF YOU WANT TO FAX A PRELIMINARY NOTE YOU WILL NEED TO TOGGLE THIS TO 'NO' IF YOU DO NOT WANT IT IN YOUR FAXED NOTE.

## 2022-06-30 ENCOUNTER — APPOINTMENT (OUTPATIENT)
Dept: GENERAL RADIOLOGY | Age: 32
DRG: 441 | End: 2022-06-30
Payer: COMMERCIAL

## 2022-06-30 ENCOUNTER — APPOINTMENT (OUTPATIENT)
Dept: CT IMAGING | Age: 32
DRG: 441 | End: 2022-06-30
Payer: COMMERCIAL

## 2022-06-30 ENCOUNTER — HOSPITAL ENCOUNTER (INPATIENT)
Age: 32
LOS: 2 days | Discharge: ANOTHER HOSPITAL | DRG: 441 | End: 2022-07-02
Attending: STUDENT IN AN ORGANIZED HEALTH CARE EDUCATION/TRAINING PROGRAM | Admitting: FAMILY MEDICINE
Payer: COMMERCIAL

## 2022-06-30 DIAGNOSIS — R60.0 BILATERAL LEG EDEMA: Primary | ICD-10-CM

## 2022-06-30 DIAGNOSIS — R78.81 BACTEREMIA: ICD-10-CM

## 2022-06-30 DIAGNOSIS — A41.9 SEPTICEMIA (HCC): ICD-10-CM

## 2022-06-30 DIAGNOSIS — R41.82 ALTERED MENTAL STATUS, UNSPECIFIED ALTERED MENTAL STATUS TYPE: ICD-10-CM

## 2022-06-30 DIAGNOSIS — E80.6 HYPERBILIRUBINEMIA: ICD-10-CM

## 2022-06-30 PROBLEM — K52.9 COLITIS: Status: ACTIVE | Noted: 2022-06-30

## 2022-06-30 PROBLEM — R65.20 SEVERE SEPSIS (HCC): Status: ACTIVE | Noted: 2022-06-30

## 2022-06-30 PROBLEM — R79.0 LOW SERUM COPPER FOR AGE: Status: ACTIVE | Noted: 2022-06-30

## 2022-06-30 PROBLEM — J18.9 HCAP (HEALTHCARE-ASSOCIATED PNEUMONIA): Status: ACTIVE | Noted: 2022-06-30

## 2022-06-30 PROBLEM — D58.9 HEMOLYTIC ANEMIA (HCC): Status: ACTIVE | Noted: 2022-06-30

## 2022-06-30 PROBLEM — N39.0 UTI (URINARY TRACT INFECTION): Status: ACTIVE | Noted: 2022-06-30

## 2022-06-30 LAB
ABO + RH BLD: NORMAL
ALBUMIN SERPL-MCNC: 1.9 G/DL (ref 3.5–5)
ALBUMIN/GLOB SERPL: 0.5 {RATIO} (ref 1.2–3.5)
ALP SERPL-CCNC: 141 U/L (ref 50–136)
ALT SERPL-CCNC: 41 U/L (ref 12–65)
AMMONIA PLAS-SCNC: 95 UMOL/L (ref 11–32)
AMPHET UR QL SCN: NEGATIVE
AMPHET UR QL SCN: NEGATIVE
ANION GAP SERPL CALC-SCNC: 9 MMOL/L (ref 7–16)
APAP SERPL-MCNC: <2 UG/ML (ref 10–30)
APPEARANCE UR: ABNORMAL
ARTERIAL PATENCY WRIST A: POSITIVE
AST SERPL-CCNC: 59 U/L (ref 15–37)
BACTERIA URNS QL MICRO: ABNORMAL /HPF
BARBITURATES UR QL SCN: NEGATIVE
BASE EXCESS BLD CALC-SCNC: 2.5 MMOL/L
BASOPHILS # BLD: 0 K/UL (ref 0–0.2)
BASOPHILS NFR BLD: 0 % (ref 0–2)
BDY SITE: ABNORMAL
BENZODIAZ UR QL: NEGATIVE
BENZODIAZ UR QL: NEGATIVE
BILIRUB DIRECT SERPL-MCNC: 9.3 MG/DL
BILIRUB SERPL-MCNC: 13.9 MG/DL (ref 0.2–1.1)
BILIRUB UR QL: ABNORMAL
BLD PROD TYP BPU: NORMAL
BLOOD BANK DISPENSE STATUS: NORMAL
BLOOD GROUP ANTIBODIES SERPL: NORMAL
BPU ID: NORMAL
BUN BLD-MCNC: 6 MG/DL (ref 8–26)
BUN SERPL-MCNC: 8 MG/DL (ref 6–23)
CALCIUM SERPL-MCNC: 8.1 MG/DL (ref 8.3–10.4)
CANNABINOIDS UR QL SCN: POSITIVE
CANNABINOIDS UR QL SCN: POSITIVE
CARDIOLIPIN IGG SER IA-ACNC: <9 GPL U/ML (ref 0–14)
CARDIOLIPIN IGM SER IA-ACNC: 13 MPL U/ML (ref 0–12)
CASTS URNS QL MICRO: ABNORMAL /LPF
CH50 SERPL-ACNC: 27 U/ML
CHLORIDE BLD-SCNC: 110 MMOL/L (ref 98–107)
CHLORIDE SERPL-SCNC: 109 MMOL/L (ref 98–107)
CK SERPL-CCNC: 161 U/L (ref 21–215)
CO2 BLD-SCNC: 25 MMOL/L (ref 13–23)
CO2 BLD-SCNC: 25.3 MMOL/L (ref 21–32)
CO2 SERPL-SCNC: 27 MMOL/L (ref 21–32)
COCAINE UR QL SCN: NEGATIVE
COCAINE UR QL SCN: NEGATIVE
COLOR UR: ABNORMAL
CREAT BLD-MCNC: 0.66 MG/DL (ref 0.8–1.5)
CREAT SERPL-MCNC: 0.7 MG/DL (ref 0.6–1)
CROSSMATCH RESULT: NORMAL
DIFFERENTIAL METHOD BLD: ABNORMAL
EBV VCA IGG SER-ACNC: 199 U/ML (ref 0–17.9)
EBV VCA IGM SER-ACNC: <36 U/ML (ref 0–35.9)
EKG ATRIAL RATE: 118 BPM
EKG DIAGNOSIS: NORMAL
EKG P AXIS: 0 DEGREES
EKG P-R INTERVAL: 82 MS
EKG Q-T INTERVAL: 365 MS
EKG QRS DURATION: 95 MS
EKG QTC CALCULATION (BAZETT): 516 MS
EKG R AXIS: 71 DEGREES
EKG T AXIS: -54 DEGREES
EKG VENTRICULAR RATE: 120 BPM
EOSINOPHIL # BLD: 0 K/UL (ref 0–0.8)
EOSINOPHIL NFR BLD: 0 % (ref 0.5–7.8)
EPI CELLS #/AREA URNS HPF: ABNORMAL /HPF
ERYTHROCYTE [DISTWIDTH] IN BLOOD BY AUTOMATED COUNT: 26.5 % (ref 11.9–14.6)
ETHANOL SERPL-MCNC: <3 MG/DL (ref 0–0.08)
ETHANOL SERPL-MCNC: <3 MG/DL (ref 0–0.08)
GAS FLOW.O2 O2 DELIVERY SYS: ABNORMAL L/MIN
GLOBULIN SER CALC-MCNC: 3.5 G/DL (ref 2.3–3.5)
GLUCOSE BLD-MCNC: 72 MG/DL (ref 65–100)
GLUCOSE SERPL-MCNC: 83 MG/DL (ref 65–100)
GLUCOSE UR STRIP.AUTO-MCNC: NEGATIVE MG/DL
HAPTOGLOB SERPL-MCNC: <8 MG/DL (ref 30–200)
HCO3 BLD-SCNC: 25.1 MMOL/L (ref 22–26)
HCT VFR BLD AUTO: 29.1 % (ref 35.8–46.3)
HGB A MFR BLD: 97.7 % (ref 96.4–98.8)
HGB A2 MFR BLD COLUMN CHROM: 2.3 % (ref 1.8–3.2)
HGB BLD-MCNC: 9.2 G/DL (ref 11.7–15.4)
HGB F MFR BLD: 0 % (ref 0–2)
HGB FRACT BLD-IMP: NORMAL
HGB S MFR BLD: 0 %
HGB UR QL STRIP: NEGATIVE
IMM GRANULOCYTES # BLD AUTO: 0.2 K/UL (ref 0–0.5)
IMM GRANULOCYTES NFR BLD AUTO: 1 % (ref 0–5)
INR PPP: 1.6
KETONES UR QL STRIP.AUTO: NEGATIVE MG/DL
LACTATE SERPL-SCNC: 1.7 MMOL/L (ref 0.4–2)
LACTATE SERPL-SCNC: 3.4 MMOL/L (ref 0.4–2)
LDH SERPL L TO P-CCNC: 409 U/L (ref 100–190)
LEUKOCYTE ESTERASE UR QL STRIP.AUTO: ABNORMAL
LYMPHOCYTES # BLD: 2.5 K/UL (ref 0.5–4.6)
LYMPHOCYTES NFR BLD: 15 % (ref 13–44)
MAGNESIUM SERPL-MCNC: 2.2 MG/DL (ref 1.8–2.4)
MCH RBC QN AUTO: 31.2 PG (ref 26.1–32.9)
MCHC RBC AUTO-ENTMCNC: 31.6 G/DL (ref 31.4–35)
MCV RBC AUTO: 98.6 FL (ref 79.6–97.8)
METHADONE UR QL: NEGATIVE
MONOCYTES # BLD: 1.2 K/UL (ref 0.1–1.3)
MONOCYTES NFR BLD: 7 % (ref 4–12)
NEUTS SEG # BLD: 13.3 K/UL (ref 1.7–8.2)
NEUTS SEG NFR BLD: 77 % (ref 43–78)
NITRITE UR QL STRIP.AUTO: POSITIVE
NRBC # BLD: 0 K/UL (ref 0–0.2)
NT PRO BNP: 385 PG/ML (ref 5–125)
O2/TOTAL GAS SETTING VFR VENT: 21 %
OPIATES UR QL: POSITIVE
OPIATES UR QL: POSITIVE
PCO2 BLD: 29.8 MMHG (ref 35–45)
PCP UR QL: NEGATIVE
PH BLD: 7.53 [PH] (ref 7.35–7.45)
PH UR STRIP: 7.5 [PH] (ref 5–9)
PLATELET # BLD AUTO: 205 K/UL (ref 150–450)
PMV BLD AUTO: 11.7 FL (ref 9.4–12.3)
PO2 BLD: 73 MMHG (ref 75–100)
POTASSIUM BLD-SCNC: 4.3 MMOL/L (ref 3.5–5.1)
POTASSIUM SERPL-SCNC: 3.9 MMOL/L (ref 3.5–5.1)
PROCALCITONIN SERPL-MCNC: 0.35 NG/ML (ref 0–0.49)
PROT SERPL-MCNC: 5.4 G/DL (ref 6.3–8.2)
PROT UR STRIP-MCNC: 100 MG/DL
PROTHROMBIN TIME: 19.2 SEC (ref 12.6–14.5)
RBC # BLD AUTO: 2.95 M/UL (ref 4.05–5.2)
RBC #/AREA URNS HPF: ABNORMAL /HPF
SALICYLATES SERPL-MCNC: <1.7 MG/DL (ref 2.8–20)
SAO2 % BLD: 96.3 % (ref 95–98)
SERVICE CMNT-IMP: ABNORMAL
SODIUM BLD-SCNC: 149 MMOL/L (ref 136–145)
SODIUM SERPL-SCNC: 145 MMOL/L (ref 136–145)
SP GR UR REFRACTOMETRY: 1.02 (ref 1–1.02)
SPECIMEN EXP DATE BLD: NORMAL
SPECIMEN TYPE: ABNORMAL
TROPONIN I SERPL HS-MCNC: 9.8 PG/ML (ref 0–14)
UNIT DIVISION: 0
UROBILINOGEN UR QL STRIP.AUTO: 2 EU/DL (ref 0.2–1)
WBC # BLD AUTO: 17.2 K/UL (ref 4.3–11.1)
WBC URNS QL MICRO: ABNORMAL /HPF

## 2022-06-30 PROCEDURE — 80053 COMPREHEN METABOLIC PANEL: CPT

## 2022-06-30 PROCEDURE — 87040 BLOOD CULTURE FOR BACTERIA: CPT

## 2022-06-30 PROCEDURE — 83615 LACTATE (LD) (LDH) ENZYME: CPT

## 2022-06-30 PROCEDURE — 83605 ASSAY OF LACTIC ACID: CPT

## 2022-06-30 PROCEDURE — 6370000000 HC RX 637 (ALT 250 FOR IP): Performed by: FAMILY MEDICINE

## 2022-06-30 PROCEDURE — 87186 SC STD MICRODIL/AGAR DIL: CPT

## 2022-06-30 PROCEDURE — 87088 URINE BACTERIA CULTURE: CPT

## 2022-06-30 PROCEDURE — 82077 ASSAY SPEC XCP UR&BREATH IA: CPT

## 2022-06-30 PROCEDURE — 36556 INSERT NON-TUNNEL CV CATH: CPT

## 2022-06-30 PROCEDURE — 83880 ASSAY OF NATRIURETIC PEPTIDE: CPT

## 2022-06-30 PROCEDURE — 85610 PROTHROMBIN TIME: CPT

## 2022-06-30 PROCEDURE — 81001 URINALYSIS AUTO W/SCOPE: CPT

## 2022-06-30 PROCEDURE — 36600 WITHDRAWAL OF ARTERIAL BLOOD: CPT

## 2022-06-30 PROCEDURE — 96368 THER/DIAG CONCURRENT INF: CPT

## 2022-06-30 PROCEDURE — 84145 PROCALCITONIN (PCT): CPT

## 2022-06-30 PROCEDURE — 71045 X-RAY EXAM CHEST 1 VIEW: CPT

## 2022-06-30 PROCEDURE — 80047 BASIC METABLC PNL IONIZED CA: CPT

## 2022-06-30 PROCEDURE — 85025 COMPLETE CBC W/AUTO DIFF WBC: CPT

## 2022-06-30 PROCEDURE — 99254 IP/OBS CNSLTJ NEW/EST MOD 60: CPT | Performed by: PEDIATRICS

## 2022-06-30 PROCEDURE — 96365 THER/PROPH/DIAG IV INF INIT: CPT

## 2022-06-30 PROCEDURE — 2580000003 HC RX 258: Performed by: FAMILY MEDICINE

## 2022-06-30 PROCEDURE — 72125 CT NECK SPINE W/O DYE: CPT

## 2022-06-30 PROCEDURE — 6360000002 HC RX W HCPCS: Performed by: STUDENT IN AN ORGANIZED HEALTH CARE EDUCATION/TRAINING PROGRAM

## 2022-06-30 PROCEDURE — 87086 URINE CULTURE/COLONY COUNT: CPT

## 2022-06-30 PROCEDURE — 93005 ELECTROCARDIOGRAM TRACING: CPT | Performed by: STUDENT IN AN ORGANIZED HEALTH CARE EDUCATION/TRAINING PROGRAM

## 2022-06-30 PROCEDURE — 83010 ASSAY OF HAPTOGLOBIN QUANT: CPT

## 2022-06-30 PROCEDURE — 2000000000 HC ICU R&B

## 2022-06-30 PROCEDURE — 80307 DRUG TEST PRSMV CHEM ANLYZR: CPT

## 2022-06-30 PROCEDURE — 6360000004 HC RX CONTRAST MEDICATION: Performed by: STUDENT IN AN ORGANIZED HEALTH CARE EDUCATION/TRAINING PROGRAM

## 2022-06-30 PROCEDURE — 6360000002 HC RX W HCPCS: Performed by: HOSPITALIST

## 2022-06-30 PROCEDURE — 96375 TX/PRO/DX INJ NEW DRUG ADDON: CPT

## 2022-06-30 PROCEDURE — 6360000002 HC RX W HCPCS: Performed by: FAMILY MEDICINE

## 2022-06-30 PROCEDURE — 70450 CT HEAD/BRAIN W/O DYE: CPT

## 2022-06-30 PROCEDURE — 99285 EMERGENCY DEPT VISIT HI MDM: CPT

## 2022-06-30 PROCEDURE — 82140 ASSAY OF AMMONIA: CPT

## 2022-06-30 PROCEDURE — 83735 ASSAY OF MAGNESIUM: CPT

## 2022-06-30 PROCEDURE — 96366 THER/PROPH/DIAG IV INF ADDON: CPT

## 2022-06-30 PROCEDURE — 36415 COLL VENOUS BLD VENIPUNCTURE: CPT

## 2022-06-30 PROCEDURE — 82803 BLOOD GASES ANY COMBINATION: CPT

## 2022-06-30 PROCEDURE — 82550 ASSAY OF CK (CPK): CPT

## 2022-06-30 PROCEDURE — 80143 DRUG ASSAY ACETAMINOPHEN: CPT

## 2022-06-30 PROCEDURE — 82248 BILIRUBIN DIRECT: CPT

## 2022-06-30 PROCEDURE — 2580000003 HC RX 258: Performed by: STUDENT IN AN ORGANIZED HEALTH CARE EDUCATION/TRAINING PROGRAM

## 2022-06-30 PROCEDURE — 84484 ASSAY OF TROPONIN QUANT: CPT

## 2022-06-30 PROCEDURE — 71260 CT THORAX DX C+: CPT

## 2022-06-30 PROCEDURE — 80179 DRUG ASSAY SALICYLATE: CPT

## 2022-06-30 PROCEDURE — 2500000003 HC RX 250 WO HCPCS: Performed by: FAMILY MEDICINE

## 2022-06-30 RX ORDER — DEXTROSE, SODIUM CHLORIDE, AND POTASSIUM CHLORIDE 5; .45; .075 G/100ML; G/100ML; G/100ML
INJECTION INTRAVENOUS CONTINUOUS
Status: DISCONTINUED | OUTPATIENT
Start: 2022-06-30 | End: 2022-06-30

## 2022-06-30 RX ORDER — MORPHINE SULFATE 2 MG/ML
1 INJECTION, SOLUTION INTRAMUSCULAR; INTRAVENOUS EVERY 4 HOURS PRN
Status: DISCONTINUED | OUTPATIENT
Start: 2022-06-30 | End: 2022-06-30

## 2022-06-30 RX ORDER — SODIUM CHLORIDE 0.9 % (FLUSH) 0.9 %
10 SYRINGE (ML) INJECTION
Status: COMPLETED | OUTPATIENT
Start: 2022-06-30 | End: 2022-06-30

## 2022-06-30 RX ORDER — SODIUM CHLORIDE 0.9 % (FLUSH) 0.9 %
5-40 SYRINGE (ML) INJECTION PRN
Status: DISCONTINUED | OUTPATIENT
Start: 2022-06-30 | End: 2022-07-02 | Stop reason: HOSPADM

## 2022-06-30 RX ORDER — POLYETHYLENE GLYCOL 3350 17 G/17G
17 POWDER, FOR SOLUTION ORAL DAILY PRN
Status: DISCONTINUED | OUTPATIENT
Start: 2022-06-30 | End: 2022-07-02 | Stop reason: HOSPADM

## 2022-06-30 RX ORDER — 0.9 % SODIUM CHLORIDE 0.9 %
1000 INTRAVENOUS SOLUTION INTRAVENOUS ONCE
Status: COMPLETED | OUTPATIENT
Start: 2022-06-30 | End: 2022-06-30

## 2022-06-30 RX ORDER — ONDANSETRON 4 MG/1
4 TABLET, ORALLY DISINTEGRATING ORAL EVERY 8 HOURS PRN
Status: DISCONTINUED | OUTPATIENT
Start: 2022-06-30 | End: 2022-07-02 | Stop reason: HOSPADM

## 2022-06-30 RX ORDER — 0.9 % SODIUM CHLORIDE 0.9 %
100 INTRAVENOUS SOLUTION INTRAVENOUS
Status: COMPLETED | OUTPATIENT
Start: 2022-06-30 | End: 2022-06-30

## 2022-06-30 RX ORDER — SODIUM CHLORIDE 9 MG/ML
INJECTION, SOLUTION INTRAVENOUS PRN
Status: DISCONTINUED | OUTPATIENT
Start: 2022-06-30 | End: 2022-07-02 | Stop reason: HOSPADM

## 2022-06-30 RX ORDER — 0.9 % SODIUM CHLORIDE 0.9 %
500 INTRAVENOUS SOLUTION INTRAVENOUS
Status: COMPLETED | OUTPATIENT
Start: 2022-06-30 | End: 2022-06-30

## 2022-06-30 RX ORDER — ONDANSETRON 2 MG/ML
4 INJECTION INTRAMUSCULAR; INTRAVENOUS
Status: COMPLETED | OUTPATIENT
Start: 2022-06-30 | End: 2022-06-30

## 2022-06-30 RX ORDER — 0.9 % SODIUM CHLORIDE 0.9 %
1000 INTRAVENOUS SOLUTION INTRAVENOUS
Status: COMPLETED | OUTPATIENT
Start: 2022-06-30 | End: 2022-06-30

## 2022-06-30 RX ORDER — 0.9 % SODIUM CHLORIDE 0.9 %
1000 INTRAVENOUS SOLUTION INTRAVENOUS
Status: DISCONTINUED | OUTPATIENT
Start: 2022-06-30 | End: 2022-06-30

## 2022-06-30 RX ORDER — DEXTROSE AND SODIUM CHLORIDE 5; .45 G/100ML; G/100ML
INJECTION, SOLUTION INTRAVENOUS CONTINUOUS
Status: DISCONTINUED | OUTPATIENT
Start: 2022-06-30 | End: 2022-07-01

## 2022-06-30 RX ORDER — ONDANSETRON 2 MG/ML
4 INJECTION INTRAMUSCULAR; INTRAVENOUS EVERY 6 HOURS PRN
Status: DISCONTINUED | OUTPATIENT
Start: 2022-06-30 | End: 2022-07-02 | Stop reason: HOSPADM

## 2022-06-30 RX ORDER — LEVALBUTEROL INHALATION SOLUTION 1.25 MG/3ML
1.25 SOLUTION RESPIRATORY (INHALATION) EVERY 8 HOURS PRN
Status: DISCONTINUED | OUTPATIENT
Start: 2022-06-30 | End: 2022-07-02 | Stop reason: HOSPADM

## 2022-06-30 RX ORDER — NICOTINE 21 MG/24HR
1 PATCH, TRANSDERMAL 24 HOURS TRANSDERMAL DAILY
Status: DISCONTINUED | OUTPATIENT
Start: 2022-06-30 | End: 2022-07-02 | Stop reason: HOSPADM

## 2022-06-30 RX ORDER — SODIUM CHLORIDE 0.9 % (FLUSH) 0.9 %
5-40 SYRINGE (ML) INJECTION EVERY 12 HOURS SCHEDULED
Status: DISCONTINUED | OUTPATIENT
Start: 2022-06-30 | End: 2022-07-02 | Stop reason: HOSPADM

## 2022-06-30 RX ORDER — HYDROMORPHONE HYDROCHLORIDE 1 MG/ML
0.5 INJECTION, SOLUTION INTRAMUSCULAR; INTRAVENOUS; SUBCUTANEOUS
Status: COMPLETED | OUTPATIENT
Start: 2022-06-30 | End: 2022-06-30

## 2022-06-30 RX ORDER — MORPHINE SULFATE 2 MG/ML
2 INJECTION, SOLUTION INTRAMUSCULAR; INTRAVENOUS
Status: DISCONTINUED | OUTPATIENT
Start: 2022-06-30 | End: 2022-07-02 | Stop reason: HOSPADM

## 2022-06-30 RX ORDER — MORPHINE SULFATE 2 MG/ML
INJECTION, SOLUTION INTRAMUSCULAR; INTRAVENOUS
Status: DISPENSED
Start: 2022-06-30 | End: 2022-07-01

## 2022-06-30 RX ADMIN — IOPAMIDOL 100 ML: 755 INJECTION, SOLUTION INTRAVENOUS at 13:29

## 2022-06-30 RX ADMIN — SODIUM CHLORIDE 100 ML: 9 INJECTION, SOLUTION INTRAVENOUS at 13:38

## 2022-06-30 RX ADMIN — PIPERACILLIN AND TAZOBACTAM 3375 MG: 3; .375 INJECTION, POWDER, LYOPHILIZED, FOR SOLUTION INTRAVENOUS at 23:06

## 2022-06-30 RX ADMIN — VANCOMYCIN HYDROCHLORIDE 1750 MG: 10 INJECTION, POWDER, LYOPHILIZED, FOR SOLUTION INTRAVENOUS at 12:27

## 2022-06-30 RX ADMIN — SODIUM CHLORIDE 500 ML: 9 INJECTION, SOLUTION INTRAVENOUS at 12:27

## 2022-06-30 RX ADMIN — SODIUM CHLORIDE, PRESERVATIVE FREE 10 ML: 5 INJECTION INTRAVENOUS at 13:39

## 2022-06-30 RX ADMIN — PIPERACILLIN AND TAZOBACTAM 3375 MG: 3; .375 INJECTION, POWDER, LYOPHILIZED, FOR SOLUTION INTRAVENOUS at 16:23

## 2022-06-30 RX ADMIN — MORPHINE SULFATE 1 MG: 2 INJECTION, SOLUTION INTRAMUSCULAR; INTRAVENOUS at 18:14

## 2022-06-30 RX ADMIN — ONDANSETRON 4 MG: 2 INJECTION INTRAMUSCULAR; INTRAVENOUS at 12:27

## 2022-06-30 RX ADMIN — HYDROMORPHONE HYDROCHLORIDE 0.5 MG: 1 INJECTION, SOLUTION INTRAMUSCULAR; INTRAVENOUS; SUBCUTANEOUS at 12:26

## 2022-06-30 RX ADMIN — SODIUM CHLORIDE 1000 ML: 9 INJECTION, SOLUTION INTRAVENOUS at 12:44

## 2022-06-30 RX ADMIN — Medication 5 UNITS: at 16:52

## 2022-06-30 RX ADMIN — DEXTROSE AND SODIUM CHLORIDE: 5; 450 INJECTION, SOLUTION INTRAVENOUS at 16:23

## 2022-06-30 RX ADMIN — ONDANSETRON 4 MG: 2 INJECTION INTRAMUSCULAR; INTRAVENOUS at 20:10

## 2022-06-30 RX ADMIN — MORPHINE SULFATE 2 MG: 2 INJECTION, SOLUTION INTRAMUSCULAR; INTRAVENOUS at 21:17

## 2022-06-30 RX ADMIN — SODIUM CHLORIDE 1000 ML: 900 INJECTION, SOLUTION INTRAVENOUS at 16:26

## 2022-06-30 RX ADMIN — SODIUM CHLORIDE, PRESERVATIVE FREE 10 ML: 5 INJECTION INTRAVENOUS at 21:05

## 2022-06-30 RX ADMIN — PIPERACILLIN AND TAZOBACTAM 4500 MG: 4; .5 INJECTION, POWDER, LYOPHILIZED, FOR SOLUTION INTRAVENOUS at 12:28

## 2022-06-30 ASSESSMENT — PAIN SCALES - GENERAL
PAINLEVEL_OUTOF10: 9
PAINLEVEL_OUTOF10: 4
PAINLEVEL_OUTOF10: 0
PAINLEVEL_OUTOF10: 1
PAINLEVEL_OUTOF10: 6

## 2022-06-30 ASSESSMENT — PAIN DESCRIPTION - ORIENTATION
ORIENTATION: ANTERIOR;LOWER
ORIENTATION: ANTERIOR
ORIENTATION: ANTERIOR;LOWER

## 2022-06-30 ASSESSMENT — PAIN DESCRIPTION - DESCRIPTORS
DESCRIPTORS: ACHING
DESCRIPTORS: ACHING;CRAMPING;PRESSURE
DESCRIPTORS: ACHING;CRAMPING;PRESSURE

## 2022-06-30 ASSESSMENT — PAIN DESCRIPTION - LOCATION
LOCATION: ABDOMEN

## 2022-06-30 ASSESSMENT — PAIN DESCRIPTION - FREQUENCY: FREQUENCY: CONTINUOUS

## 2022-06-30 ASSESSMENT — PAIN - FUNCTIONAL ASSESSMENT: PAIN_FUNCTIONAL_ASSESSMENT: PREVENTS OR INTERFERES SOME ACTIVE ACTIVITIES AND ADLS

## 2022-06-30 ASSESSMENT — PAIN DESCRIPTION - ONSET: ONSET: ON-GOING

## 2022-06-30 ASSESSMENT — PAIN DESCRIPTION - PAIN TYPE: TYPE: ACUTE PAIN

## 2022-06-30 NOTE — PROGRESS NOTES
VANCO DAILY FOLLOW UP NOTE  [address] [facility] Pharmacokinetic Monitoring Service - Vancomycin    Consulting Provider: Dr. Taylor Rodriges   Indication: Sepsis/UTI/HAP/IAI  Target Concentration: Goal AUC/ROBERTA 400-600 mg*hr/L  Day of Therapy: 1  Additional Antimicrobials: Pip-Tazo    Pertinent Laboratory Values: Wt Readings from Last 1 Encounters:   06/28/22 184 lb 3.2 oz (83.6 kg)     Temp Readings from Last 1 Encounters:   06/30/22 97.3 °F (36.3 °C) (Rectal)     Recent Labs     06/28/22  0920 06/30/22  0959 06/30/22  1015 06/30/22  1055 06/30/22  1448   BUN 7  --  8  --   --    CREATININE 0.60  --  0.70 0.66*  --    WBC 15.4*  --  17.2*  --   --    PROCAL  --   --  0.35  --   --    LACACIDPL  --  3.4*  --   --  1.7     Estimated Creatinine Clearance: 137 mL/min (A) (based on SCr of 0.66 mg/dL (L)). No results found for: Sachin Garrett    MRSA Nasal Swab: N/A. Assessment:  Date/Time Dose Concentration AUC         Note: Serum concentrations collected for AUC dosing may appear elevated if collected in close proximity to the dose administered, this is not necessarily an indication of toxicity    Plan:  Dosing recommendations based on Bayesian software  Start vancomycin 1750 mg X 1, then 1250 mg Q12H  Anticipated AUC of 462 and trough concentration of 13.4 at steady state  Renal labs as indicated   Vancomycin concentrations will be ordered as clinically appropriate   Pharmacy will continue to monitor patient and adjust therapy as indicated    Thank you for the consult,  Ben Piña.  [facility]

## 2022-06-30 NOTE — ED TRIAGE NOTES
Patient arrives to ED via EMS from home. Per family they found her face down on the bathroom floor this morning. Patient jaundice and responds to painful stimuli. Patient had a blood transfusion yesterday for anemia. Patient has swelling to bilateral extremities.

## 2022-06-30 NOTE — H&P
Hospitalist Admission History and Physical         NAME:            Gabriela Sauer    Age:                36 y.o.    :               1990    MRN:              [id number]    PCP: Konstantin Bailon DO    Consulting MD:    Treatment Team: Attending Provider: Lizzeth Ryan DO; Consulting Physician: AMERICA Cifuentes NP         Chief Complaint   Patient presents with    Altered Mental Status   HPI:    Patient is a 36 y.o. female who presented to the ED for cc AMS found by her family on the bathroom floor. She was noted to be jaundiced and only responding to painful stimuli. Had blood transfusion yesterday. 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 Recently had skin biopsy from abdomen due to bullous like process. Results pending. Vitals- . BP borderline low. Labs- Total bili 13.9. WBC 17.2. Hg 9.2 from baseline 7.1. UA consistent with UTI    CT chest abdomen pelvis with contrast - diffuse hepatic steatosis, diffuse colon wall thickening. Bilateral upper lobe consolidation. Past Medical History:   Diagnosis Date    Anxiety     Bilateral leg edema     if she doesn't eat enough protein    History of blood transfusion 2022    Hypertension 2021    Interstitial cystitis             Past Surgical History:   Procedure Laterality Date    CHOLECYSTECTOMY      GASTRIC BYPASS SURGERY      was 283 lbs    TONSILLECTOMY      WRIST SURGERY              Family History   Problem Relation Age of Onset    Other Father         doesn't have contact with    Ovarian Cancer Mother 39    Other Mother         fibromyalgia    Rheum Arthritis Mother        Family history reviewed and negative except as noted above.          Social History     Social History Narrative    Not on file            Social History     Tobacco Use    Smoking status: Current Every Day Smoker     Packs/day: 0.50    Smokeless tobacco: Never Used    Tobacco comment: Quit smoking: started age 14   Substance Use Topics    Alcohol use: Not Currently            Social History     Substance and Sexual Activity   Drug Use Not Currently                 Allergies   Allergen Reactions    Lamictal [Lamotrigine] Rash            Prior to Admission medications    Medication Sig Start Date End Date Taking?  Authorizing Provider   Haylee Fum-FePoly-FA-Vit C-Vit B3 (INTEGRA F PO) Take by mouth    Historical Provider, MD   Potassium Bicarbonate (EFFER-K PO) Take by mouth    Historical Provider, MD   cephALEXin (KEFLEX) 500 MG capsule Take 1 capsule by mouth 3 times daily for 7 days 6/23/22 6/30/22  [facility] Fortune, DO   Multiple Vitamins-Minerals (THERAPEUTIC MULTIVITAMIN-MINERALS) tablet Take 1 tablet by mouth daily  Patient not taking: Reported on 6/28/2022    Historical Provider, MD   furosemide (LASIX) 20 MG tablet Take 1 tablet by mouth daily 6/22/22   Gwenevere Fortune, DO   DULoxetine (CYMBALTA) 30 MG extended release capsule Take 1 capsule by mouth daily 6/13/22   Gwenevere Fortune, DO   metoprolol tartrate (LOPRESSOR) 25 MG tablet Take 1 tablet by mouth 2 times daily 6/13/22   Gwenevere Fortune, DO                      Review of Systems    Cannot obtain due to AMS    Objective:         Patient Vitals for the past 24 hrs:   Temp Pulse Resp BP SpO2   06/30/22 1445 -- (!) 113 27 112/64 95 %   06/30/22 1431 -- (!) 108 (!) 33 (!) 104/52 92 %   06/30/22 1430 -- -- (!) 33 (!) 109/52 92 %   06/30/22 1400 -- (!) 114 28 (!) 104/58 95 %   06/30/22 1352 -- (!) 116 26 -- 96 %   06/30/22 1300 -- (!) 116 23 107/72 96 %   06/30/22 1246 -- (!) 107 19 -- 96 %   06/30/22 1245 -- (!) 106 25 114/61 95 %   06/30/22 1234 -- (!) 120 29 (!) 114/55 96 %   06/30/22 1215 -- (!) 119 21 (!) 144/118 93 %   06/30/22 1200 -- (!) 119 25 113/87 93 %   06/30/22 1155 -- (!) 112 (!) 31 -- 94 %   06/30/22 1145 -- (!) 121 24 118/77 94 %   06/30/22 1130 -- (!) 116 24 115/72 96 %   06/30/22 1115 -- (!) 116 21 (!) 115/93 94 %   06/30/22 1100 -- -- -- 130/85 --   06/30/22 1030 -- (!) 116 29 110/71 100 %   06/30/22 1029 -- (!) 118 25 -- 100 %   06/30/22 1015 -- (!) 117 28 (!) 132/96 98 %   06/30/22 1005 97.3 °F (36.3 °C) -- 24 120/77 98 %   06/30/22 0951 -- (!) 120 -- -- --            No intake/output data recorded. No intake/output data recorded.          Data Review:   Recent Results (from the past 24 hour(s))   EKG 12 Lead    Collection Time: 06/30/22  9:53 AM   Result Value Ref Range    Ventricular Rate 120 BPM    Atrial Rate 118 BPM    P-R Interval 82 ms    QRS Duration 95 ms    Q-T Interval 365 ms    QTc Calculation (Bazett) 516 ms    P Axis 0 degrees    R Axis 71 degrees    T Axis -54 degrees    Diagnosis Sinus tachycardia    Urinalysis    Collection Time: 06/30/22  9:59 AM   Result Value Ref Range    Color, UA ORANGE      Appearance TURBID      Specific Gravity, UA 1.021 1.001 - 1.023      pH, Urine 7.5 5.0 - 9.0      Protein,  (A) NEG mg/dL    Glucose, UA Negative mg/dL    Ketones, Urine Negative NEG mg/dL    Bilirubin Urine LARGE (A) NEG      Blood, Urine Negative NEG      Urobilinogen, Urine 2.0 (H) 0.2 - 1.0 EU/dL    Nitrite, Urine Positive (A) NEG      Leukocyte Esterase, Urine MODERATE (A) NEG      WBC, UA 0-3 0 /hpf    RBC, UA 0-3 0 /hpf    Epithelial Cells UA 0-3 0 /hpf    BACTERIA, URINE 4+ (H) 0 /hpf    Casts HYALINE 0 /lpf   Lactic Acid    Collection Time: 06/30/22  9:59 AM   Result Value Ref Range    Lactic Acid, Plasma 3.4 (H) 0.4 - 2.0 MMOL/L   Urine Drug Screen    Collection Time: 06/30/22  9:59 AM   Result Value Ref Range    PCP, Urine Negative      Benzodiazepines, Urine Negative      Cocaine, Urine Negative      Amphetamine, Urine Negative      Methadone, Urine Negative      THC, TH-Cannabinol, Urine Positive      Opiates, Urine Positive      Barbiturates, Urine Negative     Salicylate    Collection Time: 06/30/22  9:59 AM   Result Value Ref Range    Salicylate, Serum <0.7 (L) 2.8 - 20.0 MG/DL   Ammonia    Collection Time: 06/30/22  9:59 AM   Result Value Ref Range    Ammonia 95 (H) 11 - 32 UMOL/L   CBC with Auto Differential    Collection Time: 06/30/22 10:15 AM   Result Value Ref Range    WBC 17.2 (H) 4.3 - 11.1 K/uL    RBC 2.95 (L) 4.05 - 5.2 M/uL    Hemoglobin 9.2 (L) 11.7 - 15.4 g/dL    Hematocrit 29.1 (L) 35.8 - 46.3 %    MCV 98.6 (H) 79.6 - 97.8 FL    MCH 31.2 26.1 - 32.9 PG    MCHC 31.6 31.4 - 35.0 g/dL    RDW 26.5 (H) 11.9 - 14.6 %    Platelets 722 009 - 198 K/uL    MPV 11.7 9.4 - 12.3 FL    nRBC 0.00 0.0 - 0.2 K/uL    Differential Type AUTOMATED      Seg Neutrophils 77 43 - 78 %    Lymphocytes 15 13 - 44 %    Monocytes 7 4.0 - 12.0 %    Eosinophils % 0 (L) 0.5 - 7.8 %    Basophils 0 0.0 - 2.0 %    Immature Granulocytes 1 0.0 - 5.0 %    Segs Absolute 13.3 (H) 1.7 - 8.2 K/UL    Absolute Lymph # 2.5 0.5 - 4.6 K/UL    Absolute Mono # 1.2 0.1 - 1.3 K/UL    Absolute Eos # 0.0 0.0 - 0.8 K/UL    Basophils Absolute 0.0 0.0 - 0.2 K/UL    Absolute Immature Granulocyte 0.2 0.0 - 0.5 K/UL   Comprehensive Metabolic Panel    Collection Time: 06/30/22 10:15 AM   Result Value Ref Range    Sodium 145 136 - 145 mmol/L    Potassium 3.9 3.5 - 5.1 mmol/L    Chloride 109 (H) 98 - 107 mmol/L    CO2 27 21 - 32 mmol/L    Anion Gap 9 7 - 16 mmol/L    Glucose 83 65 - 100 mg/dL    BUN 8 6 - 23 MG/DL    CREATININE 0.70 0.6 - 1.0 MG/DL    GFR African American >60 >60 ml/min/1.73m2    GFR Non- >60 >60 ml/min/1.73m2    Calcium 8.1 (L) 8.3 - 10.4 MG/DL    Total Bilirubin 13.9 (H) 0.2 - 1.1 MG/DL    ALT 41 12 - 65 U/L    AST 59 (H) 15 - 37 U/L    Alk Phosphatase 141 (H) 50 - 136 U/L    Total Protein 5.4 (L) 6.3 - 8.2 g/dL    Albumin 1.9 (L) 3.5 - 5.0 g/dL    Globulin 3.5 2.3 - 3.5 g/dL    Albumin/Globulin Ratio 0.5 (L) 1.2 - 3.5     Troponin    Collection Time: 06/30/22 10:15 AM   Result Value Ref Range    Troponin, High Sensitivity 9.8 0 - 14 pg/mL   Magnesium    Collection Time: 06/30/22 10:15 AM   Result Value Ref Range    Magnesium 2.2 1.8 - 2.4 mg/dL   Protime-INR    Collection Time: 06/30/22 10:15 AM   Result Value Ref Range    Protime 19.2 (H) 12.6 - 14.5 sec    INR 1.6     Procalcitonin    Collection Time: 06/30/22 10:15 AM   Result Value Ref Range    Procalcitonin 0.35 0.00 - 0.49 ng/mL   CK    Collection Time: 06/30/22 10:15 AM   Result Value Ref Range    Total  21 - 215 U/L   Acetaminophen Level    Collection Time: 06/30/22 10:15 AM   Result Value Ref Range    Acetaminophen Level <2 (L) 10.0 - 30.0 ug/mL   Ethanol    Collection Time: 06/30/22 10:15 AM   Result Value Ref Range    Ethanol Lvl <3 MG/DL   Lactate Dehydrogenase    Collection Time: 06/30/22 10:15 AM   Result Value Ref Range     (H) 100 - 190 U/L   Bilirubin, Direct    Collection Time: 06/30/22 10:15 AM   Result Value Ref Range    Bilirubin, Direct 9.3 (H) <0.4 MG/DL   proBNP, N-TERMINAL    Collection Time: 06/30/22 10:15 AM   Result Value Ref Range    NT Pro- (H) 5 - 125 PG/ML   POC CHEM 8    Collection Time: 06/30/22 10:55 AM   Result Value Ref Range    POC Sodium 149 (H) 136 - 145 mmol/L    POC Potassium 4.3 3.5 - 5.1 mmol/L    POC Chloride 110 (H) 98 - 107 mmol/L    POC TCO2 25.3 21 - 32 mmol/L    POC Glucose 72 65 - 100 mg/dL    POC BUN 6 (L) 8 - 26 mg/dL    POC Creatinine 0.66 (L) 0.8 - 1.5 mg/dL    POC GFR African American >60 >60 ml/min/1.73m2    Glomerular Filtration Rate, POC >60 >60 ml/min/1.73m2   Culture, Blood 1    Collection Time: 06/30/22 11:45 AM    Specimen: Blood   Result Value Ref Range    Special Requests CENTRAL      Culture PENDING    POC Blood, Cord, Arterial    Collection Time: 06/30/22  1:03 PM   Result Value Ref Range    DEVICE ROOM AIR      FIO2 21 %    pH, Arterial, POC 7.53 (H) 7.35 - 7.45      pCO2, Arterial, POC 29.8 (L) 35 - 45 MMHG    pO2, Arterial, POC 73 (L) 75 - 100 MMHG    HCO3, Mixed 25.1 22 - 26 MMOL/L SO2c, Arterial, POC 96.3 95 - 98 %    Base Excess 2.5 mmol/L    POC Oliver's Test Positive      Site RIGHT RADIAL      Specimen type: ARTERIAL      Performed by: Molly     POC TCO2 25 (H) 13 - 23 MMOL/L            Physical Exam:         General:    Alert, frail appearing. moaning    Eyes:    Conjunctivae/corneas clear. PERRL    Ears:    Normal    Nose:    Nares normal.     Mouth/Throat:    Dry mouth, mouth open    Neck:     no JVD. Back:     deferred    Lungs:     Clear to auscultation bilaterally but limited breathe sounds throughout. Heart:    Sinus tachycardia    Abdomen:     Soft, mild tenderness to RUQ without rebound. +BS. Extremities:    Extremities with no edema. Feet with dry calloused skin    Skin:    Jaundiced, as below    Neurologic:    Cannot speak though will look at me when I speak to her and did answer one question of mine which was are you drinking alcohol (she said no). Limited ROM in bed. Assessment and Plan         Principal Problem:    Severe sepsis ([address]  Active Problems:    Elevated bilirubin    Macrocytic anemia    HCAP (healthcare-associated pneumonia)    UTI (urinary tract infection)    Colitis    Status post gastric bypass for obesity    Essential hypertension  Resolved Problems:    * No resolved hospital problems. *    Severe sepsis - Met by RR, HR, WBC, AMS secondary to suspected UTI, HCAP, and colitis (infectious versus inflammatory) - Vancomycin, Zosyn. Order sputum culture, blood, and urine culture. 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 Consult hematology to see today. Will she need IVIG?--I appreciate their help. Hepatitis panel negative in the past.     Transaminitis - Likely from hemolytic anemia along with current sepsis. Tx as above. Order UDS and ETOH level.  Nicotine patch    Chronic edema to legs -

## 2022-06-30 NOTE — CONSULTS
lesions. Impression  1. Bilateral upper lobe consolidation. 2. Hepatic steatosis. 3. Pan colitis. CT head normal  S/p skin biopsy by jose Hendricks yesterday    On exam in ICU, pt.  Alert and oriented VSS  Bullous, blistering lesions and healing slightly improved rash  Sign jaundice, bronzed color  Tachy, no murmur  No rales  abd soft  Hepatomegaly no spleen    Prior labs:  Larissa negative  periopheral smear multiple target cells c/w liver injury likely; no shistocytes, mild clumping no blasts  Peripheral flow negative  Elevated PT and TCT  Low CH50  Elevated IGG  Low copper   Ceruloplasmin pending    Impression:  Non immune hemalytic anemia with colitis, cholestatic liver disease and bullous impetigo/purpuric rash suggestive of inflammatory or infectious condition  -ANCA panel negative    Recommend:  -repeat Larissa and send super larissa to [facility]  -complete gely work up: mri brain, 24 hour urine copper, ceruloplasmin  -BMA/Bx with IR (was scheduled for tomorrow)  -complete coags: PT and PT mixing study; PTT and PTT mixing study; lupus anticoagulatnt panel + but suspect LA and not factor deficiency  -factor VII (seven, 7) activity level  -consult GI, evaluate colitis and liver disease, consider liver biopsy, colonoscopy  -vitamin C levels  -IGLESIA, UDS  -PNH by flow cytometry   -if return of encephalopathy, neuro consult  -reassurance, hold steroids until initial onc work up returned or performed  Will continue to follow     Office note from 2 days ago below  Appreciate the consult  John Gastelum MD  Director, Adolescent Young Adult [address] [facility] and [address] [place] [address], [address] [place] [address] Phone       HISTORY OF PRESENT ILLNESS  Addis Price is a 36 y.o. y.o. female with  Microcytic anemia  ABSTRACT  Reason for Referral: Other iron deficiency anemia     Referring Provider:  Marni Cruz Buck Gusman DO     Primary Care Provider: Richard Huff DO     Family History of Cancer/Hematologic Disorders: Mother with ovarian cancer     Presenting Symptoms: fatigue     Narrative with recent with Results/Procedures/Biopsies and Dates completed:   Ms. Saul Martin is a 29-year-old  female who reports to be a current every day smoker of 0.5 pack per day of cigarettes. She reports alcohol use and drug substance use as not currently. Her medical history reports as abnormal pap smear, stomach ulcer, anxiety, bilateral LE edema, HTN and interstitial cystitis. Her surgical history reports as cholecystectomy, tonsillectomy, EGD, hysteroscopy, cystoscopy, colonoscopy and gastric bypass (2010). On 6/2/22, Ms. Saul Martin presented to [facility] ED with complaints of body swelling and a syncopal fall that she hit her head on the counter as she fell. Her hemoglobin in ED reported decreased to 7.1. ED reported hemoccult negative. She denied any bleeding and reports to not have menstrual cycles. Her albumin was decreased to 1.8 and her LFTs were elevated with an , AST of 51 and a total bilirubin of 2.3. Her renal function was normal, and her hepatitis screening reported non-reactive. Her repeat hemoglobin reported at 6.3 and she received 1 unit of PRBCs with a post transfusion hemoglobin reported at 7.5. Her 6/2/22 iron panel reported normal. She had an abdominal ultrasound due to elevated LFTs. The imaging reported mild hepatosplenomegaly and probable hepatic steatosis. Her 6/3/22 labs reported a decreased iron of 24, haptoglobin of <8 with an increased retic count of 3.7, absolute retic of 0.23094 and immature retic fraction of 30.4. She was reported as stable and was discharged home. On 6/13/22 she presented to her PCP via telemedicine for follow up. She reported to not be eating well and was encouraged to increase her protein intake.  A referral was placed to hematology to further evaluate her anemia and possible iron infusions. On 6/17/22 she saw her PCP for a rash over abdominal and groin. Her 6/17/22 CMP reported decreased total protein and albumin with elevated ALK and total bilirubin. She reports to be seeing GI on 7/13/22. She was to start oral iron supplement. On 6/22/22 she saw PCP for rash follow up. Rash remained therefore referral placed to dermatology.  Her 6/22/22 CBC reported an elevated WBC of 14.9, MCV of 100.4 and RDW of 28.9 with a decreased RBC of 2.68, hemoglobin of 7.9, hematocrit of 27 and MCHC of 29.3.     Labs       11/10/2020 12:32 6/2/2022 12:19 6/2/2022 17:14 6/3/2022 04:39 6/3/2022 18:44 6/22/2022 11:36   WBC 6.5 6.6   6.2   14.9 (H)   RBC 3.84 2.82 (L)   2.48 (L)   2.69 (L)   Hemoglobin Quant 12.1 7.1 (L)   6.3 (LL) 7.5 (L) 7.9 (L)   Hematocrit 36.5 23.6 (L)   21.1 (L) 24.8 (L) 27.0 (L)   MCV 95 83.7   85.1   100.4 (H)   MCH 31.5 25.2 (L)   25.4 (L)   29.4   MCHC 33.2 30.1 (L)   29.9 (L)   29.3 (L)   MPV   12.2   11.5   12.2   RDW 13.6 25.5 (H)   26.0 (H)   28.9 (H)   Platelet Count 389 818   184   218   Platelet Comment       ADEQUATE   ADEQUATE   Neutrophils % 40             Lymphocyte % 51             Absolute Mono #   0.5   0.6   0.7   Monocytes % 6             Eosinophils % 2 1   1   1   Basophils % 1             Neutrophils Absolute 2.6             Lymphocytes Absolute 3.3 (H)             Monocytes Absolute 0.4             Eosinophils Absolute 0.1             Basophils Absolute 0.0 0.0   0.0   0.0   Differential Type   AUTOMATED   AUTOMATED   AUTOMATED   Seg Neutrophils   51   47   62   GRANULOCYTE ABSOLUTE COUNT 0.0             Segs Absolute   3.3   2.8   9.4 (H)   Lymphocytes   40   42   31   Absolute Lymph #   2.6   2.6   4.6   Monocytes   8   9   5   Absolute Eos #   0.1   0.1   0.1   Basophils   0   0   0   Immature Granulocytes 0 1   1   1   Nucleated Red Blood Cells   0.00   0.00   0.00   WBC Comment       Result Confirmed By Smear   RARE   RETIC HGB CONC.   31         liver is mildly   enlarged at 21 cm.  Diffuse hepatic parenchymal hyperechogenicity with sonic   attenuation is most commonly associated with fatty infiltration. No focal liver   lesion is seen.  The spleen is mildly enlarged at 14.2 cm.  The pancreas,   kidneys, aorta, and IVC appear unremarkable as imaged.           Impression       1.  Mild hepatosplenomegaly and probable hepatic steatosis no definite focal   liver mass identified. 2.  No biliary ductal dilatation status post cholecystectomy.         Notes from Referring Provider: n/a     Other Pertinent Information: She saw pain management in the past for chronic pain however was discharged in 9/2018 due to breaking their contract with use of narcotics and THC. HPI: history of gastric bypass 2010  No prior history of anemia or iron infusion  Fell early June/lat may  Has dentures, rotting out    No multiple injuries or broken bones    No etoh  No drugs, met  Smoke 1 pack a day    occ marijauna  No bulemia or anorexia    Poor healing and or scar issues    Significant skin issues  Derm appt.  Tomorrow      Patient Denies:   Fevers   Night Sweats   Chills   Weight Loss   Bone Pain   Lymphadenopathy  Patient Denies:  Nose bleeds  Gum bleeds  Bruising or petechia  Bleeding with surgery  Bleeding with accidents  Transfusions  History or free bleeding or hemophilia    Current Facility-Administered Medications   Medication Dose Route Frequency    sodium chloride flush 0.9 % injection 5-40 mL  5-40 mL IntraVENous 2 times per day    sodium chloride flush 0.9 % injection 5-40 mL  5-40 mL IntraVENous PRN    0.9 % sodium chloride infusion   IntraVENous PRN    ondansetron (ZOFRAN-ODT) disintegrating tablet 4 mg  4 mg Oral Q8H PRN    Or    ondansetron (ZOFRAN) injection 4 mg  4 mg IntraVENous Q6H PRN    polyethylene glycol (GLYCOLAX) packet 17 g  17 g Oral Daily PRN    nicotine (NICODERM CQ) 14 MG/24HR 1 patch  1 patch TransDERmal Daily    0.9 % sodium chloride bolus  1,000 mL IntraVENous Once    levalbuterol (XOPENEX) nebulizer solution 1.25 mg  1.25 mg Nebulization Q8H PRN    piperacillin-tazobactam (ZOSYN) 3,375 mg in sodium chloride 0.9 % 50 mL IVPB (mini-bag)  3,375 mg IntraVENous Q8H    dextrose 5 % and 0.45 % sodium chloride infusion   IntraVENous Continuous    tuberculin injection 5 Units  5 Units IntraDERmal Once    [START ON 7/1/2022] vancomycin (VANCOCIN) 1250 mg in sodium chloride 0.9% 250 mL IVPB  1,250 mg IntraVENous Q12H       Past Medical History:   Diagnosis Date    Anxiety     Bilateral leg edema 2020    if she doesn't eat enough protein    History of blood transfusion 06/02/2022    Hypertension 02/09/2021    Interstitial cystitis 2017       Past Surgical History:   Procedure Laterality Date    CHOLECYSTECTOMY  2012    GASTRIC BYPASS SURGERY  2010    was 283 lbs    TONSILLECTOMY      WRIST SURGERY         Family History   Problem Relation Age of Onset    Other Father         doesn't have contact with    Ovarian Cancer Mother 39   Abelardo Mitchell Other Mother         fibromyalgia    Rheum Arthritis Mother        Social History     Tobacco Use    Smoking status: Current Every Day Smoker     Packs/day: 0.50    Smokeless tobacco: Never Used    Tobacco comment: Quit smoking: started age 14   Substance Use Topics    Alcohol use: Not Currently    Drug use: Not Currently       There is no immunization history for the selected administration types on file for this patient. Allergies   Allergen Reactions    Lamictal [Lamotrigine] Rash         Review of Systems   Review of Systems   Constitutional: Negative. HENT: Negative. Eyes: Negative. Respiratory: Negative. Cardiovascular: Negative. Gastrointestinal: Negative. Genitourinary: Negative. Musculoskeletal: Negative. Skin: Negative. Neurological: Negative. Endo/Heme/Allergies: Negative. Psychiatric/Behavioral: Negative.       Pain reviewed fully and addressed this 06/30/2022 Sinus tachycardia   Final    WBC 06/30/2022 17.2* 4.3 - 11.1 K/uL Final    RBC 06/30/2022 2.95* 4.05 - 5.2 M/uL Final    Hemoglobin 06/30/2022 9.2* 11.7 - 15.4 g/dL Final    Hematocrit 06/30/2022 29.1* 35.8 - 46.3 % Final    MCV 06/30/2022 98.6* 79.6 - 97.8 FL Final    MCH 06/30/2022 31.2  26.1 - 32.9 PG Final    MCHC 06/30/2022 31.6  31.4 - 35.0 g/dL Final    RDW 06/30/2022 26.5* 11.9 - 14.6 % Final    Platelets 94/84/4811 205  150 - 450 K/uL Final    MPV 06/30/2022 11.7  9.4 - 12.3 FL Final    nRBC 06/30/2022 0.00  0.0 - 0.2 K/uL Final    **Note: Absolute NRBC parameter is now reported with Hemogram**    Differential Type 06/30/2022 AUTOMATED    Final    Seg Neutrophils 06/30/2022 77  43 - 78 % Final    Lymphocytes 06/30/2022 15  13 - 44 % Final    Monocytes 06/30/2022 7  4.0 - 12.0 % Final    Eosinophils % 06/30/2022 0* 0.5 - 7.8 % Final    Basophils 06/30/2022 0  0.0 - 2.0 % Final    Immature Granulocytes 06/30/2022 1  0.0 - 5.0 % Final    Segs Absolute 06/30/2022 13.3* 1.7 - 8.2 K/UL Final    Absolute Lymph # 06/30/2022 2.5  0.5 - 4.6 K/UL Final    Absolute Mono # 06/30/2022 1.2  0.1 - 1.3 K/UL Final    Absolute Eos # 06/30/2022 0.0  0.0 - 0.8 K/UL Final    Basophils Absolute 06/30/2022 0.0  0.0 - 0.2 K/UL Final    Absolute Immature Granulocyte 06/30/2022 0.2  0.0 - 0.5 K/UL Final    Sodium 06/30/2022 145  136 - 145 mmol/L Final    Potassium 06/30/2022 3.9  3.5 - 5.1 mmol/L Final    Chloride 06/30/2022 109* 98 - 107 mmol/L Final    CO2 06/30/2022 27  21 - 32 mmol/L Final    Anion Gap 06/30/2022 9  7 - 16 mmol/L Final    Glucose 06/30/2022 83  65 - 100 mg/dL Final    BUN 06/30/2022 8  6 - 23 MG/DL Final    CREATININE 06/30/2022 0.70  0.6 - 1.0 MG/DL Final    GFR  06/30/2022 >60  >60 ml/min/1.73m2 Final    GFR Non- 06/30/2022 >60  >60 ml/min/1.73m2 Final    Comment:      Estimated GFR is calculated using the Modification of Diet in Renal Disease (MDRD) Study equation, reported for both  Americans (GFRAA) and non- Americans (GFRNA), and normalized to 1.73m2 body surface area. The physician must decide which value applies to the patient. The MDRD study equation should only be used in individuals age 25 or older. It has not been validated for the following: pregnant women, patients with serious comorbid conditions,or on certain medications, or persons with extremes of body size, muscle mass, or nutritional status.       Calcium 06/30/2022 8.1* 8.3 - 10.4 MG/DL Final    Total Bilirubin 06/30/2022 13.9* 0.2 - 1.1 MG/DL Final    ALT 06/30/2022 41  12 - 65 U/L Final    AST 06/30/2022 59* 15 - 37 U/L Final    Alk Phosphatase 06/30/2022 141* 50 - 136 U/L Final    Total Protein 06/30/2022 5.4* 6.3 - 8.2 g/dL Final    Albumin 06/30/2022 1.9* 3.5 - 5.0 g/dL Final    Globulin 06/30/2022 3.5  2.3 - 3.5 g/dL Final    Albumin/Globulin Ratio 06/30/2022 0.5* 1.2 - 3.5   Final    Color, UA 06/30/2022 ORANGE    Final    Color Reference Range: Straw, Yellow or Dark Yellow    Appearance 06/30/2022 TURBID    Final    Specific [place], UA 06/30/2022 1.021  1.001 - 1.023   Final    pH, Urine 06/30/2022 7.5  5.0 - 9.0   Final    Protein, UA 06/30/2022 100* NEG mg/dL Final    Glucose, UA 06/30/2022 Negative  mg/dL Final    Ketones, Urine 06/30/2022 Negative  NEG mg/dL Final    Bilirubin Urine 06/30/2022 LARGE* NEG   Final    Positive, unable to confirm with Ictotest.    Blood, Urine 06/30/2022 Negative  NEG   Final    Urobilinogen, Urine 06/30/2022 2.0* 0.2 - 1.0 EU/dL Final    Nitrite, Urine 06/30/2022 Positive* NEG   Final    Leukocyte Esterase, Urine 06/30/2022 MODERATE* NEG   Final    WBC, UA 06/30/2022 0-3  0 /hpf Final    RBC, UA 06/30/2022 0-3  0 /hpf Final    Epithelial Cells UA 06/30/2022 0-3  0 /hpf Final    BACTERIA, URINE 06/30/2022 4+* 0 /hpf Final    Casts 06/30/2022 HYALINE  0 /lpf Final    0-3    Troponin, High Sensitivity 06/30/2022 9.8  0 - 14 pg/mL Final    Lactic Acid, Plasma 06/30/2022 3.4* 0.4 - 2.0 MMOL/L Final    Magnesium 06/30/2022 2.2  1.8 - 2.4 mg/dL Final    Protime 06/30/2022 19.2* 12.6 - 14.5 sec Final    INR 06/30/2022 1.6    Final    Comment: Suggested therapeutic INR range:  Venous thrombosis and embolus  2.0-3.0  Prosthetic heart valve         2.5-3.5  ** Note new reference range and method **      Procalcitonin 06/30/2022 0.35  0.00 - 0.49 ng/mL Final    Comment:      Suspected Sepsis:  <0.50 ng/mL     Low likelihood of sepsis. 0.50-2.00 ng/mL    Increased likelihood of sepsis. Antibiotics encouraged. >2.00 ng/mL  High risk of sepsis/shock. Antibiotics strongly encouraged. Suspected Lower Resp Tract Infections:  <0.24 ng/mL    Low likelihood of bacterial infection. >0.24 ng/mL    Increased likelihood of bacterial infection. Antibiotics encouraged. With successful antibiotic therapy, PCT levels should decrease rapidly. (Half-life of 24 to 36 hours)       Procalcitonin values from samples collected within the first 6 hours of systemic infection may still be low. Retesting may be indicated. Values from day 1 and day 4 can be entered into the Change in Procalcitonin Calculator (www.Stereobots-pct-[email or web address] com) to determine the patient's Mortality Risk Prognosis. In healthy neonates, plasma Procalcitonin (PCT) concentrations increase gradually after birth, reaching peak values at about 24 hours of age then decrease to normal valu                           es below 0.5 ng/mL by 48-72 hours of age.       Total CK 06/30/2022 161  21 - 215 U/L Final    PCP, Urine 06/30/2022 Negative    Final    Cutoff Level:  25 ng/ml    Benzodiazepines, Urine 06/30/2022 Negative    Final    Cutoff Level: 200 ng/mL    Cocaine, Urine 06/30/2022 Negative    Final    Cutoff Level: 300 ng/mL    Amphetamine, Urine 06/30/2022 Negative    Final    Cutoff Level: 300 ng/mL    Methadone, Urine 06/30/2022 Negative    Final    Cutoff Level: 300 ng/mL    THC, TH-Cannabinol, Urine 06/30/2022 Positive    Final    Cutoff Level: 50 ng/mL    Opiates, Urine 06/30/2022 Positive    Final    Cutoff Level: 300 ng/mL    Barbiturates, Urine 06/30/2022 Negative    Final    Comment: Cutoff Level: 200 ng/mL  This test is a screen and provides preliminary test results. Confirmation of positive results are available upon request.      Salicylate, Serum 80/22/4167 <1.7* 2.8 - 20.0 MG/DL Final    Comment: Values <2.8 mg/dL are considered negative  Therapeutic range: 2.8-20.0 mg/dL  NOTE: THE REFERENCE RANGE REFLECTS THE  CURRENT TEST METHOD THERAPEUTIC RANGE.  Acetaminophen Level 06/30/2022 <2* 10.0 - 30.0 ug/mL Final    Comment: Potentially toxic:    >40.0 ug/mL  NOTE: THE REFERENCE RANGE REFLECTS THE  CURRENT TEST METHOD THERAPEUTIC RANGE.  Ethanol Lvl 06/30/2022 <3  MG/DL Final    3 is the lower limit of the test method. Values reported as <3 should be interpreted as no alcohol detected.     Ammonia 06/30/2022 95* 11 - 32 UMOL/L Final    LD 06/30/2022 409* 100 - 190 U/L Final    Bilirubin, Direct 06/30/2022 9.3* <0.4 MG/DL Final    NT Pro-BNP 06/30/2022 385* 5 - 125 PG/ML Final    POC Sodium 06/30/2022 149* 136 - 145 mmol/L Final    POC Potassium 06/30/2022 4.3  3.5 - 5.1 mmol/L Final    POC Chloride 06/30/2022 110* 98 - 107 mmol/L Final    POC TCO2 06/30/2022 25.3  21 - 32 mmol/L Final    POC Glucose 06/30/2022 72  65 - 100 mg/dL Final    POC BUN 06/30/2022 6* 8 - 26 mg/dL Final    POC Creatinine 06/30/2022 0.66* 0.8 - 1.5 mg/dL Final    POC GFR  06/30/2022 >60  >60 ml/min/1.73m2 Final    Glomerular Filtration Rate, POC 06/30/2022 >60  >60 ml/min/1.73m2 Final    Comment: (NOTE)  Estimated GFR is calculated using the Modification of Diet in Renal   Disease (MDRD) Study Equation, reported for both  Americans   (GFRAA) and non- Americans (GFRNA), and normalized to 1.73m2   body surface area. The physician must decide which value applies to   the patient. The MDRD study equation should only be used in   individuals age 25 or older. It has not been validated for the   following: pregnant women, patients with serious comorbid conditions,   or on certain medications, or persons with extremes of body size,   muscle mass, or nutritional status.  Special Requests 06/30/2022 CENTRAL    Preliminary    Culture 06/30/2022 PENDING   Preliminary    DEVICE 06/30/2022 ROOM AIR    Final    FIO2 06/30/2022 21  % Final    pH, Arterial, POC 06/30/2022 7.53* 7.35 - 7.45   Final    pCO2, Arterial, POC 06/30/2022 29.8* 35 - 45 MMHG Final    pO2, Arterial, POC 06/30/2022 73* 75 - 100 MMHG Final    HCO3, Mixed 06/30/2022 25.1  22 - 26 MMOL/L Final    SO2c, Arterial, POC 06/30/2022 96.3  95 - 98 % Final    Base Excess 06/30/2022 2.5  mmol/L Final    POC Oliver's Test 06/30/2022 Positive    Final    Site 06/30/2022 RIGHT RADIAL    Final    Specimen type: 06/30/2022 ARTERIAL    Final    Performed by: 06/30/2022 Molly   Final    POC TCO2 06/30/2022 25* 13 - 23 MMOL/L Final    Lactic Acid, Plasma 06/30/2022 1.7  0.4 - 2.0 MMOL/L Final   Hospital Outpatient Visit on 06/29/2022   Component Date Value Ref Range Status    History Check 06/28/2022 Historical check performed   Final   Hospital Outpatient Visit on 06/28/2022   Component Date Value Ref Range Status    T4 Free 06/28/2022 1.1  0.78 - 1.4 NG/DL Final    Color, UA 06/28/2022 BROWN    Final    Appearance 06/28/2022 CLEAR    Final    Specific [place], UA 06/28/2022 1.020  1.001 - 1.023   Final    pH, Urine 06/28/2022 5.5  5.0 - 9.0   Final    Protein, UA 06/28/2022 30* NEG mg/dL Final    Glucose, UA 06/28/2022 100* NEG mg/dL Corrected    Corrected on 06/28 AT 1354:  This is a correction to the medical record of the test results previously reported as Negative    Ketones, Urine 06/28/2022 15* NEG mg/dL Final    Bilirubin Urine 06/28/2022 LARGE* NEG   Final    Positive, unable to confirm with Ictotest.    Blood, Urine 06/28/2022 TRACE* NEG   Final    Urobilinogen, Urine 06/28/2022 >=8.0  0.2 - 1.0 EU/dL Final    Nitrite, Urine 06/28/2022 Negative  NEG   Final    Leukocyte Esterase, Urine 06/28/2022 Negative  NEG   Final    Folate 06/28/2022 8.6  3.1 - 17.5 ng/mL Final    Vitamin B-12 06/28/2022 1836* 193 - 986 pg/mL Final    Uric Acid 06/28/2022 6.6* 2.6 - 6.0 MG/DL Final    LD 06/28/2022 204* 100 - 190 U/L Final    Phosphorus 06/28/2022 2.6  2.5 - 4.5 MG/DL Final    Polyspecific [place] 06/28/2022 NEG   Final    Sed Rate, Automated 06/28/2022 20  0 - 20 mm/hr Final    TSH, 3RD GENERATION 06/28/2022 1.450  0.358 - 3 uIU/mL Final    Ferritin 06/28/2022 74  8 - 388 NG/ML Final    Iron 06/28/2022 115  35 - 150 ug/dL Final    Comment: Known Interfering Substances section:  \"Iron values may be falsely elevated in  serum samples from patients with  anticoagulants (e.g., hemodialysis patients). \"  Limitations of Procedure section:  \"Turbidity resulting from precipitation of  fibrinogen in the serum of patients treated  with anticoagulants (e.g. hemodialysis  patients) may cause spuriously elevated  iron results. \"      TIBC 06/28/2022 137* 250 - 450 ug/dL Final    TRANSFERRIN SATURATION 06/28/2022 84  >20 % Final    Reticulocyte Count,Automated 06/28/2022 5.1* 0.3 - 2.0 % Final    Absolute Retic # 06/28/2022 0.1165* 0.026 - 0.095 M/ul Final    Immature Retic Fraction 06/28/2022 32.1* 3.0 - 15.9 % Final    Retic Hemoglobin conc. 06/28/2022 39* 29 - 35 pg Final    Sodium 06/28/2022 145  136 - 145 mmol/L Final    Potassium 06/28/2022 3.3* 3.5 - 5.1 mmol/L Final    Chloride 06/28/2022 108* 98 - 107 mmol/L Final    CO2 06/28/2022 27  21 - 32 mmol/L Final    Anion Gap 06/28/2022 10  7 - 16 mmol/L Final    Glucose 06/28/2022 83  65 - 100 mg/dL Final    BUN 06/28/2022 7  6 - 23 MG/DL Final    CREATININE 06/28/2022 0.60  0.6 - 1.0 MG/DL Final    GFR  06/28/2022 >60  >60 ml/min/1.73m2 Final    GFR Non- 06/28/2022 >60  >60 ml/min/1.73m2 Final    Comment:      Estimated GFR is calculated using the Modification of Diet in Renal Disease (MDRD) Study equation, reported for both  Americans (GFRAA) and non- Americans (GFRNA), and normalized to 1.73m2 body surface area. The physician must decide which value applies to the patient. The MDRD study equation should only be used in individuals age 25 or older. It has not been validated for the following: pregnant women, patients with serious comorbid conditions,or on certain medications, or persons with extremes of body size, muscle mass, or nutritional status.       Calcium 06/28/2022 7.4* 8.3 - 10.4 MG/DL Final    Total Bilirubin 06/28/2022 9.7* 0.2 - 1.1 MG/DL Final    ALT 06/28/2022 33  12 - 65 U/L Final    AST 06/28/2022 49* 15 - 37 U/L Final    Alk Phosphatase 06/28/2022 132  50 - 136 U/L Final    Total Protein 06/28/2022 5.2* 6.3 - 8.2 g/dL Final    Albumin 06/28/2022 1.9* 3.5 - 5.0 g/dL Final    Globulin 06/28/2022 3.3  2.3 - 3.5 g/dL Final    Albumin/Globulin Ratio 06/28/2022 0.6* 1.2 - 3.5   Final    WBC 06/28/2022 15.4* 4.3 - 11.1 K/uL Final    Comment: RESULTS CHECKED X 2  PERIPHERAL REVIEW TO FOLLOW      RBC 06/28/2022 2.28* 4.05 - 5.2 M/uL Final    Hemoglobin 06/28/2022 7.1* 11.7 - 15.4 g/dL Final    Hematocrit 06/28/2022 23.4* 35.8 - 46.3 % Final    MCV 06/28/2022 102.6* 79.6 - 97.8 FL Final    MCH 06/28/2022 31.1  26.1 - 32.9 PG Final    MCHC 06/28/2022 30.3* 31.4 - 35.0 g/dL Final    RDW 06/28/2022 28.7* 11.9 - 14.6 % Final    Platelets 58/57/0887 220  150 - 450 K/uL Final    MPV 06/28/2022 12.0  9.4 - 12.3 FL Final    nRBC 06/28/2022 0.00  0.0 - 0.2 K/uL Final    **Note: Absolute NRBC parameter is now reported with Hemogram**    Seg Neutrophils 06/28/2022 68  43 - 78 % Final    Lymphocytes 06/28/2022 24  13 - 44 % Final  Monocytes 06/28/2022 6  4.0 - 12.0 % Final    Eosinophils % 06/28/2022 1  0.5 - 7.8 % Final    Basophils 06/28/2022 0  0.0 - 2.0 % Final    Immature Granulocytes 06/28/2022 1  0.0 - 5.0 % Final    Segs Absolute 06/28/2022 10.4* 1.7 - 8.2 K/UL Final    Absolute Lymph # 06/28/2022 3.7  0.5 - 4.6 K/UL Final    Absolute Mono # 06/28/2022 0.9  0.1 - 1.3 K/UL Final    Absolute Eos # 06/28/2022 0.2  0.0 - 0.8 K/UL Final    Basophils Absolute 06/28/2022 0.0  0.0 - 0.2 K/UL Final    Absolute Immature Granulocyte 06/28/2022 0.2  0.0 - 0.5 K/UL Final    RBC Comment 06/28/2022     Final                    Value:MARKED  ANISOCYTOSIS + POIKILOCYTOSIS      RBC Comment 06/28/2022     Final                    Value:OCCASIONAL  OVALOCYTES      RBC Comment 06/28/2022     Final                    Value:OCCASIONAL  TARGET CELLS      RBC Comment 06/28/2022     Final                    Value:OCCASIONAL  SCHISTOCYTES      RBC Comment 06/28/2022     Final                    Value:MODERATE  HYPOCHROMIA      RBC Comment 06/28/2022     Final                    Value:MODERATE  POLYCHROMASIA      RBC Comment 06/28/2022 Dimorphic RBCs    Final    WBC Comment 06/28/2022 Result Confirmed By Smear    Final    Comment: OCCASIONAL  ATYPICAL LYMPHOCYTES PRESENT      Platelet Comment 78/63/6108 ADEQUATE    Final    Comment: OCCASIONAL  LARGE FORMS PRESENT      Differential Type 06/28/2022 AUTOMATED    Final    CEA 06/28/2022 6.0* 0.0 - 3.0 ng/mL Final    Comment: Nonsmoker:  <3.0 ng/mL  Smoker:     <5.0 ng/mL  [employer]. Patient's results of tumor marker testing may not be comparable to labs using different manufacturers/methods.       Hemoglobin A/Hemoglobin Total 06/28/2022 97.7  96.4 - 98.8 % Final    Hemoglobin S 06/28/2022 0.0  0.0 % Final    Hemoglobin A2 06/28/2022 2.3  1.8 - 3.2 % Final    Hemoglobin F 06/28/2022 0.0  0.0 - 2.0 % Final    Interpretation 06/28/2022 Comment    Final    Comment: (NOTE)  Normal hemoglobin present; no hemoglobin variant or beta thalassemia  identified. Note: Alpha thalassemia may not be detected by the Hgb  Fractionation Cascade panel. If alpha thalassemia is suspected,  [facility] offers Alpha-Thalassemia DNA Analysis (#[id number]). Performed At: [address] [id number]  Vipin Moraes MD QV:[id number]      Hep A IgM 06/28/2022 NONREACTIVE  NR   Final    Hep B Core Ab, IgM 06/28/2022 NONREACTIVE  NR   Final    Hepatitis B Surface Ag 06/28/2022 NONREACTIVE  NR   Final    Hepatitis C Ab 06/28/2022 NONREACTIVE  NR   Final    HIV 1/2 Interp 06/28/2022 NONREACTIVE  NR   Final    HIV 1/2 Result Comment 06/28/2022 SEE NOTE    Final    Comment: While this assay is highly sensitive, a non-reactive/negative result for HIV antibodies HIV-1 and HIV-2 and p24 antigen, does not exclude the possibility of exposure to or infection with HIV. HIV antibodies and/or p24 antigen may be undetectable in some stages of the infection and in some clinical conditions. Test performed by the ADVXO1aur HIV Ag/Ab Combo (CHIV), 4th generation assay. Recommend consulting relevant CDC guidelines for informing test subject of the result and its interpretation.       Test Description: 06/28/2022 CINDY 2 V617F, QUALITATIVE    Final     SEND TO [employer]    Reference Lab 06/28/2022 [facility]   Final    Results: 06/28/2022 COLLECT FOR [facility]   Final    Test Description: 06/28/2022 BCR   Final    Comment: ABL1 STANDARD P210, P190    SEND TO [employer]      Reference Lab 06/28/2022 [facility]   Final    Results: 06/28/2022 COLLECT FOR [facility]   Final    Test Description: 06/28/2022 CALR   Final     SEND  [address]     Reference Lab 06/28/2022 [facility]   Final    Results: 06/28/2022 COLLECT FOR [facility]   Final    Test Description: 06/28/2022 MPL    Final     SEND  [address]     Reference Lab 06/28/2022 [facility]   Final    Results: 06/28/2022 COLLECT FOR [facility]   Final    Compl, Total (CH50) 06/28/2022 27* >41 U/mL Final    Comment: (NOTE)              Age                Male          Female           1 - 30 days         Not Estab. Not Estab. 31 days -  6 months          >32            >20    7 months - 17 years           >39            >39              >17 years           >41            >41  **NOTE: The adult (\">17 years\") reference interval**          range is used to flag abnormals on this          report. If the patient is 16years old or          younger, use the table above to determine          out of range values. Performed At: [facility] & [address] [place] [id number]  Lydia Sepulveda MD XF:[id number]      CRP 06/28/2022 4.0* 0.0 - 0.9 mg/dL Final    IgG, Serum 06/28/2022 1,197  586 - 1,602 mg/dL Final    IgA 06/28/2022 369* 87 - 352 mg/dL Final    IgM 06/28/2022 224* 26 - 217 mg/dL Final    Comment: (NOTE)  Performed At: [facility] & [address] [place] [id number]  Lydia Sepulveda MD CU:[id number]      WBC, UA 06/28/2022 0-3  0 /hpf Final    RBC, UA 06/28/2022 0-3  0 /hpf Final    Epithelial Cells UA 06/28/2022 3-5  0 /hpf Final    BACTERIA, URINE 06/28/2022 2+* 0 /hpf Final    Casts 06/28/2022 HYALINE  0 /lpf Final    0-3    Crystals 06/28/2022 0  0 /LPF Final    Mucus, UA 06/28/2022 3+* 0 /lpf Final    OTHER OBSERVATIONS 06/28/2022 Interfering substances due to urine color may affect results of dipstick chemistries. Final    Zinc 06/28/2022 45  44 - 115 ug/dL Final    Comment: (NOTE)  This test was developed and its performance characteristics  determined by Nadege Gandhi. It has not been cleared or approved  by the Food and Drug Administration.                                  Detection Limit = 5  Performed At: [facility] & [facility]  [address] [place] [id number]  Lydia Sepulveda MD WZ:[id number]      Copper 06/28/2022 50* 80 - 158 ug/dL Final    Comment: (NOTE)  This test was or C-ANCA  patterns alone is not specific for the diagnosis of Wegener's  Granulomatosis (WG) or microscopic polyangiitis. Decisions about  treatment should not be based solely on ANCA IFA results. The  International ANCA Group Consensus recommends follow up testing of  positive sera with both WV-3 and MPO-ANCA enzyme immunoassays. As  many as 5% serum samples are positive only by EIA. Ref. AM J Clin Pathol 1999;111:507-513.  Atypical pANCA 06/28/2022 <1:20  Neg:<1:20 titer Final    Comment: (NOTE)  The atypical pANCA pattern has been observed in a significant  percentage of patients with ulcerative colitis, primary sclerosing  cholangitis and autoimmune hepatitis. Performed At: [facility] [id number]  Eleazar Olson MD CM:[id number]      Anti b2-Glycoprotein IgG 06/28/2022 <9  0 - 20 GPI IgG units Final    Comment: (NOTE)  The reference interval reflects a 3SD or 99th percentile interval,  which is thought to represent a potentially clinically significant  result in accordance with the International Consensus Statement on  the classification criteria for definitive antiphospholipid  syndrome (APS). J Thromb Haem 2006;4:295-306.  Anti b2-Glycoprotein IgM 06/28/2022 12  0 - 32 GPI IgM units Final    Comment: (NOTE)  The reference interval reflects a 3SD or 99th percentile interval,  which is thought to represent a potentially clinically significant  result in accordance with the International Consensus Statement on  the classification criteria for definitive antiphospholipid  syndrome (APS). J Thromb Haem 2006;4:295-306.   Performed At: [facility]  [employer] [address] [id number]  Eleazar Olson MD :[id number]      Beta-2 Glyco 1 IgA 06/28/2022 13  0 - 25 GPI IgA units Final    Comment: (NOTE)  The reference interval reflects a 3SD or 99th percentile interval,  which is thought to represent a potentially clinically significant  result in accordance with the International Consensus Statement on  the classification criteria for definitive antiphospholipid  syndrome (APS). J Thromb Haem 2006;4:295-306.   Performed At: [address] [id number]  Davin Barrios MD VO:[id number]      Cardiolipin Antibody, IgG 06/28/2022 <9  0 - 14 GPL U/mL Final    Comment: (NOTE)                           Negative:              <15                           Indeterminate:     15 - 20                           Low-Med Positive: >20 - 80                           High Positive:         >80      Cardiolipin Antibody, IgM 06/28/2022 13* 0 - 12 MPL U/mL Final    Comment: (NOTE)                           Negative:              <13                           Indeterminate:     13 - 20                           Low-Med Positive: >20 - 80                           High Positive:         >80  Performed At: [address] [id number]  Davin Barrios MD BS:[id number]      EBV VCA IgM 06/28/2022 <36.0  0.0 - 35.9 U/mL Final    Comment: (NOTE)                                  Negative        <36.0                                  Equivocal 36.0 - 43.9                                  Positive        >43.9      EBV VCA IgG 06/28/2022 199.0* 0.0 - 17.9 U/mL Final    Comment: (NOTE)                                  Negative        <18.0                                  Equivocal 18.0 - 21.9                                  Positive        >21.9  Performed At: [facility] [address], [place] [id number]  Davin Barrios MD HL:[id number]      CMV IgG 06/28/2022 9.90* 0.00 - 0.59 U/mL Final    Comment: (NOTE)                                Negative          <0.60                                Equivocal   0.60 - 0.69                                Positive          >0.69      CMV IgM 06/28/2022 <30.0  0.0 - 29.9 AU/mL Final    Comment: (NOTE)                                 Negative <30.0                                 Equivocal  30.0 - 34.9                                 Positive         >34.9  A positive result is generally indicative of acute  infection, reactivation or persistent IgM production.   Performed At: [facility] [address] [place], [place] [id number]  Ghada Coombs MD ZD:[id number]      Crossmatch expiration date 06/28/2022 07/01/2022,2359   Final    ABO/Rh 06/28/2022 AB POSITIVE   Final    Antibody Screen 06/28/2022 NEG   Final    Unit Number 06/28/2022 [id number]   Final    Product Code Blood Bank 06/28/2022 RC LR   Final    Unit Divison 06/28/2022 00   Final    Dispense Status Blood Bank 06/28/2022 TRANSFUSED   Final    Crossmatch Result 06/28/2022 Compatible   Final    Cold Agglutinin Titer 06/28/2022 Negative  Neg <1:32   Final    Comment: (NOTE)  Performed At: [address] [id number]  Ghada Coombs MD RB:[id number]      Test Description: 06/28/2022 TCR GENE REARRANGEMENT    Final     SEND TO [employer]    Reference Lab 06/28/2022 CLAIR   Final    Results: 06/28/2022 COLLECT FOR [facility]   Final    Test Description: 06/28/2022 PML DEE TRANSLOCATION PCR    Final     SEND TO [employer]    Reference Lab 06/28/2022 CLAIR   Final    Results: 06/28/2022 COLLECT FOR [facility]   Final    Dilute Viper Venom 1:1 Mix 06/28/2022 54.1* 0.0 - 40.4 sec Final    Comment: (NOTE)  Performed At: [facility] & [address] [id number]  Ghada Coombs MD ET:[id number]      DRVVT RATIO 06/28/2022 1.4* 0.8 - 1.2 ratio Final    Comment: (NOTE)  Performed At: [address] [id number]  Ghada Coombs MD BM:[id number]      PTT-LA mix 06/28/2022 49.7* 0.0 - 48.9 sec Final    Comment: (NOTE)  Performed At: [facility] & [address] [id number]  Ghada Coombs MD YE:[id number]      Hex phase phospholipid 06/28/2022 3  0 - 11 sec Final Comment: (NOTE)  Performed At: [address] [id number]  Bertha Anderson MD DF:[id number]       Results for Phoebe Rouse" (MRN [id number]) as of 6/28/2022 13:58   Ref. Range 11/10/2020 12:32   WBC Latest Ref Range: 3.4 - 10.8 x10E3/uL 6.5   RBC Latest Ref Range: 3.77 - 5.28 x10E6/uL 3.84   Hemoglobin Quant Latest Ref Range: 11.1 - 15.9 g/dL 12.1   Hematocrit Latest Ref Range: 34.0 - 46.6 % 36.5   MCV Latest Ref Range: 79 - 97 fL 95   MCH Latest Ref Range: 26.6 - 33.0 pg 31.5   MCHC Latest Ref Range: 31.5 - 35.7 g/dL 33.2   RDW Latest Ref Range: 11.7 - 15.4 % 13.6   Platelet Count Latest Ref Range: 150 - 450 x10E3/uL 178   Neutrophils % Latest Ref Range: Not Estab. % 40   Lymphocyte % Latest Ref Range: Not Estab. % 51   Monocytes % Latest Ref Range: Not Estab. % 6   Eosinophils % Latest Ref Range: Not Estab. % 2   Basophils % Latest Ref Range: Not Estab. % 1   Neutrophils Absolute Latest Ref Range: 1.4 - 7.0 x10E3/uL 2.6   Lymphocytes Absolute Latest Ref Range: 0.7 - 3.1 x10E3/uL 3.3 (H)   Monocytes Absolute Latest Ref Range: 0.1 - 0.9 x10E3/uL 0.4   Eosinophils Absolute Latest Ref Range: 0.0 - 0.4 x10E3/uL 0.1   Basophils Absolute Latest Ref Range: 0.0 - 0.2 x10E3/uL 0.0   GRANULOCYTE ABSOLUTE COUNT Latest Ref Range: 0.0 - 0.1 x10E3/uL 0.0   Immature Granulocytes Latest Ref Range: Not Estab. % 0       Radiology:  CT Results (most recent):  No results found for this or any previous visit from the past 365 days. PET Results (most recent):  No results found for this or any previous visit from the past 365 days. AGUILAR Results (most recent):  No results found for this or any previous visit from the past 365 days. US  No results found for this or any previous visit from the past 365 days.          Patient Active Problem List   Diagnosis    Status post gastric bypass for obesity    Essential hypertension    Hypoalbuminemia    Normocytic anemia    Bilateral leg edema    Elevated bilirubin    Acute hypokalemia    Macrocytic anemia    Severe sepsis (HCC)    HCAP (healthcare-associated pneumonia)    UTI (urinary tract infection)    Colitis         ASSESSMENT and PLAN  Hyun is a 37 yo with progressive macrocytic anemia, purpuric rash and fatigue s/p bariatric surgery remote and has significant target cells and elevated retic, bili and anemia c/w hemolysis, ? Immune versus non immune.     1) Hemolysis: direct larissa and hgb electrophoresis pending  Past labs c/w normal MCV and MCHC suggest no hereditary LCAT or hgb C  -haptoglobin <8 c/w hemolysis  Likely immune mediated  -CT CAP to assess liver and lymphoproliferative disease  -BMA/Bx under IR/sedation  -transfuse 1 unt PRBCs, if cold AIHA, then use blood warmer  -follow up 1-2 weeks  2) Anemia  -transfuse as above  -full nutritional work up   -zinc copper methyl malonic acid  3) purpuric rash  -? HSP or possible lymphoproliferative component  -derm biopsy pending, sees tomorrow  -on keflex per PCP, agree with empiric tx  Reassurance and continue eval  Total time 70 min 50% in direct consultation about the patient's diagnosis and management  Almas Venegas MD  Director, [facility] [employer] [facility] [address] and Blood Disorders Program  [id number] [place] [address]  25 [facility], [address] [place] [address] Phone

## 2022-06-30 NOTE — ED PROVIDER NOTES
family who is now at bedside about the need for central line. They agreed, this line was placed and patient was transported back to CT.1:06 PM    Critical care time: 60 minutes of critical care time was performed in the emergency department. This was separate from any other procedures listed during the patients emergency department course. The failure to initiate these interventions on an urgent basis would likely have resulted in sudden, clinically significant or life-threatening deterioration in the patients condition. Despite having (hypotension / lactate =4 / documented septic shock), a standard fluid resuscitation of 30 mL/kg would harm the patient because the patient has Concern for fluid overload. Fluid overload may be due to underlying heart failure or specifically delayed transfusion reaction given patient's recent blood transfusion 24 hours ago. I feel that overhydration of this patient may be detrimental/dangerous. Therefore, I am ordering a specific volume of 500 ml and intial antibiotics with plans to reassess.         Amount and/or Complexity of Data Reviewed  Clinical lab tests: ordered and reviewed  Tests in the radiology section of CPT®: ordered and reviewed  Tests in the medicine section of CPT®: ordered and reviewed  Discussion of test results with the performing providers: yes  Decide to obtain previous medical records or to obtain history from someone other than the patient: yes  Obtain history from someone other than the patient: yes  Review and summarize past medical records: yes  Discuss the patient with other providers: yes  Independent visualization of images, tracings, or specimens: yes    Risk of Complications, Morbidity, and/or Mortality  Presenting problems: high  Diagnostic procedures: high  Management options: high    Patient Progress  Patient progress: stable       Lester Lisa is a 36 y.o. female who presents to the Emergency Department with chief complaint of    Chief Complaint   Patient presents with    Altered Mental Status      42-year-old female patient with a history of anemia, lower extremity edema and recent hospital admission presents to this department emergently via EMS. Initial call was made for altered mental status, patient was found down on the floor at her home by her parents. Last seen well yesterday. Patient received a blood transfusion for her anemia yesterday. EMS reports significant jaundice, tachycardia but otherwise stable vital signs. Patient has been unable to provide any meaningful information for EMS providers and appears altered and lethargic on my exam.  Reportedly being worked up for her anemia including plans for bone marrow biopsy. EMS unable to establish IV access in route. No other further information available at this time.             Review of Systems   Unable to perform ROS: Mental status change       Past Medical History:   Diagnosis Date    Anxiety     Bilateral leg edema 2020    if she doesn't eat enough protein    History of blood transfusion 06/02/2022    Hypertension 02/09/2021    Interstitial cystitis 2017        Past Surgical History:   Procedure Laterality Date    CHOLECYSTECTOMY  2012    GASTRIC BYPASS SURGERY  2010    was 283 lbs    TONSILLECTOMY      WRIST SURGERY          Family History   Problem Relation Age of Onset    Other Father         doesn't have contact with    Ovarian Cancer Mother 39    Other Mother         fibromyalgia    Rheum Arthritis Mother            Social Connections:     Frequency of Communication with Friends and Family: Not on file    Frequency of Social Gatherings with Friends and Family: Not on file    Attends Tenriism Services: Not on file    Active Member of Clubs or Organizations: Not on file    Attends Club or Organization Meetings: Not on file    Marital Status: Not on file        Allergies   Allergen Reactions    Lamictal [Lamotrigine] Rash        Vitals signs and nursing note reviewed. Patient Vitals for the past 4 hrs:   Pulse   06/30/22 0951 (!) 120          Physical Exam  Vitals and nursing note reviewed. Constitutional:       General: She is in acute distress. Appearance: Normal appearance. She is ill-appearing and toxic-appearing. Comments: Lethargic appearing female patient, diffusely jaundiced, wakes to sternal rub, able to give her name. Responds/localizes painful stimulus on exam.     Currently protecting airway. HENT:      Head: Normocephalic and atraumatic. Right Ear: External ear normal.      Left Ear: External ear normal.      Nose: Nose normal.      Mouth/Throat:      Mouth: Mucous membranes are moist.   Eyes:      General: Scleral icterus present. Right eye: No discharge. Left eye: No discharge. Extraocular Movements: Extraocular movements intact. Pupils: Pupils are equal, round, and reactive to light. Cardiovascular:      Rate and Rhythm: Normal rate and regular rhythm. Pulses: Normal pulses. Heart sounds: Normal heart sounds. Pulmonary:      Effort: Pulmonary effort is normal. No respiratory distress. Abdominal:      General: Abdomen is flat. There is no distension. Palpations: There is no mass. Tenderness: There is generalized abdominal tenderness and tenderness in the left upper quadrant. There is no guarding or rebound. Negative signs include Haines's sign and McBurney's sign. Hernia: No hernia is present. Comments: Diffusely tender on my exam specifically in the left upper quadrant. No obvious distention. Musculoskeletal:         General: No swelling, tenderness or deformity. Normal range of motion. Cervical back: Normal range of motion. Comments: The lower extremities are grossly edematous with firm though pitting edema present extending up through the thighs. Some faint mottling is noted bilaterally. Skin:     General: Skin is warm.       Capillary Refill: Capillary refill takes less than 2 seconds. Comments: Skin is diffusely jaundiced with scabbed appearing rash noted over the upper abdomen, right arm and lower extremities. No obvious purpura   Neurological:      General: No focal deficit present. Mental Status: She is lethargic and confused. GCS: GCS eye subscore is 4. GCS verbal subscore is 3. GCS motor subscore is 5. Psychiatric:         Attention and Perception: She is inattentive. Central Line    Date/Time: 6/30/2022 1:06 PM  Performed by: Rony Ernst DO  Authorized by: Rony Ernst DO     Consent:     Consent obtained:  Verbal and emergent situation    Consent given by:  Parent    Risks discussed:  Arterial puncture, bleeding, infection, pneumothorax and incorrect placement    Alternatives discussed:  No treatment  Pre-procedure details:     Hand hygiene: Hand hygiene performed prior to insertion      Sterile barrier technique: All elements of maximal sterile technique followed      Skin preparation:  2% chlorhexidine    Skin preparation agent: Skin preparation agent completely dried prior to procedure    Anesthesia (see MAR for exact dosages): Anesthesia method:  Local infiltration    Local anesthetic:  Lidocaine 1% WITH epi  Procedure details:     Location:  R internal jugular    Patient position:  Trendelenburg    Procedural supplies:  Triple lumen    Catheter size:  7.5 Fr    Landmarks identified: yes      Ultrasound guidance: yes      Sterile ultrasound techniques: Sterile gel and sterile probe covers were used      Number of attempts:  1    Successful placement: yes    Post-procedure details:     Post-procedure:  Dressing applied and line sutured    Assessment:  Blood return through all ports, no pneumothorax on x-ray, placement verified by x-ray and free fluid flow    Patient tolerance of procedure:   Tolerated well, no immediate complications  Comments:      After initial placement of initial sterile dressing, this dressing appeared to become unsecured to patient's neck. I placed a fresh set of sterile gloves on prior to changing this dressing and Biopatch. This was done with the assistance of patient's RN at bedside. Patient tolerated procedure well. Verified placement on x-ray. Labs Reviewed   CBC WITH AUTO DIFFERENTIAL   COMPREHENSIVE METABOLIC PANEL   URINALYSIS   TROPONIN   LACTIC ACID   MAGNESIUM   PROTIME-INR   PROCALCITONIN   CK   URINE DRUG SCREEN   SALICYLATE LEVEL   ACETAMINOPHEN LEVEL   ETHANOL   AMMONIA   PROBNP, N-TERMINAL   LACTATE DEHYDROGENASE   HAPTOGLOBIN   BILIRUBIN, DIRECT   POCT BLOOD GASES        XR CHEST PORTABLE    (Results Pending)   CT HEAD WO CONTRAST    (Results Pending)   CT CERVICAL SPINE WO CONTRAST    (Results Pending)   CT CHEST ABDOMEN PELVIS W CONTRAST    (Results Pending)                    ED Course as of 07/02/22 1416   Thu Jun 30, 2022   1036 EKG interpretation: Sinus tachycardia, rate of 120, normal axis, no acute ischemic change. QT segment is slightly prolonged at 495. [BR]   1409 CT CHEST ABDOMEN PELVIS W CONTRAST [BR]      ED Course User Index  [BR] Anne Avendano,         Voice dictation software was used during the making of this note. This software is not perfect and grammatical and other typographical errors may be present. This note has not been completely proofread for errors.      Anne Avendano,   06/30/22 1003       Anne Avendano DO  06/30/22 12 [facility], DO  06/30/22 12 [facility], DO  07/02/22 1416

## 2022-06-30 NOTE — CONSULTS
Subjective:      Referring Physician : Derek Shelton  Primary GI : [facility]    Chief Complaint : confusion    History of Present Illness :Patient is a 36 y.o. female who presented to the ED for cc AMS found by her family on the bathroom floor. She was noted to be jaundiced and only responding to painful stimuli. Had blood transfusion yesterday. 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 Recently had skin biopsy from abdomen due to bullous like process. Results pending.      Pain- 1 day of diffuse abd pain- denies prior pain  Bleeding- no  Bowel movements- normal earlier today  Nausea- no  Vomiting- no  Dysphagia- no  ASA/ NSAIDS- yes  Anticoagulants- no  Wt loss- no  Past Medical History:   Diagnosis Date    Anxiety     Bilateral leg edema 2020    if she doesn't eat enough protein    History of blood transfusion 06/02/2022    Hypertension 02/09/2021    Interstitial cystitis 2017     Past Surgical History:   Procedure Laterality Date    CHOLECYSTECTOMY  2012    GASTRIC BYPASS SURGERY  2010    was 283 lbs    TONSILLECTOMY      WRIST SURGERY       Family History   Problem Relation Age of Onset    Other Father         doesn't have contact with   Larry Menjivar Ovarian Cancer Mother 39   Larry Menjivar Other Mother         fibromyalgia    Rheum Arthritis Mother      Current Facility-Administered Medications   Medication Dose Route Frequency    sodium chloride flush 0.9 % injection 5-40 mL  5-40 mL IntraVENous 2 times per day    sodium chloride flush 0.9 % injection 5-40 mL  5-40 mL IntraVENous PRN    0.9 % sodium chloride infusion   IntraVENous PRN    ondansetron (ZOFRAN-ODT) disintegrating tablet 4 mg  4 mg Oral Q8H PRN    Or    ondansetron (ZOFRAN) injection 4 mg  4 mg IntraVENous Q6H PRN    polyethylene glycol (GLYCOLAX) packet 17 g  17 g Oral Daily PRN    nicotine (NICODERM CQ) 14 MG/24HR 1 patch  1 patch TransDERmal Daily    levalbuterol (XOPENEX) nebulizer solution 1.25 mg  1.25 mg Nebulization Q8H PRN    piperacillin-tazobactam (ZOSYN) 3,375 mg in sodium chloride 0.9 % 50 mL IVPB (mini-bag)  3,375 mg IntraVENous Q8H    dextrose 5 % and 0.45 % sodium chloride infusion   IntraVENous Continuous    tuberculin injection 5 Units  5 Units IntraDERmal Once    [START ON 7/1/2022] vancomycin (VANCOCIN) 1250 mg in sodium chloride 0.9% 250 mL IVPB  1,250 mg IntraVENous Q12H    morphine injection 1 mg  1 mg IntraVENous Q4H PRN    morphine 2 MG/ML injection         Prior to Admission medications    Medication Sig Start Date End Date Taking? Authorizing Provider   Haylee Fum-FePoly-FA-Vit C-Vit B3 (INTEGRA F PO) Take by mouth    Historical Provider, MD   Potassium Bicarbonate (EFFER-K PO) Take by mouth    Historical Provider, MD   cephALEXin (KEFLEX) 500 MG capsule Take 1 capsule by mouth 3 times daily for 7 days 6/23/22 6/30/22  Shaquille Ortiz, DO   Multiple Vitamins-Minerals (THERAPEUTIC MULTIVITAMIN-MINERALS) tablet Take 1 tablet by mouth daily  Patient not taking: Reported on 6/28/2022    Historical Provider, MD   furosemide (LASIX) 20 MG tablet Take 1 tablet by mouth daily 6/22/22   Shaquille Ortiz, DO   DULoxetine (CYMBALTA) 30 MG extended release capsule Take 1 capsule by mouth daily 6/13/22   Shaquille Ortiz, DO   metoprolol tartrate (LOPRESSOR) 25 MG tablet Take 1 tablet by mouth 2 times daily 6/13/22   Shaquille Ortiz, DO     Allergies   Allergen Reactions    Lamictal [Lamotrigine] Rash     Social History     Occupational History    Not on file   Tobacco Use    Smoking status: Current Every Day Smoker     Packs/day: 0.50    Smokeless tobacco: Never Used    Tobacco comment: Quit smoking: started age 14   Substance and Sexual Activity    Alcohol use: Not Currently    Drug use: Not Currently    Sexual activity: Not on file       Review of Systems:   The rest of 10 organ review of systems is negative or as per HPI and PMH.     Objective:     Physical Exam:     Patient Vitals for the past 8 hrs:   BP Temp Temp src Pulse Resp SpO2 Height Weight   06/30/22 1815 119/72 -- -- (!) 115 22 98 % -- --   06/30/22 1800 117/62 -- -- (!) 116 30 98 % -- --   06/30/22 1745 114/63 -- -- (!) 117 (!) 40 97 % -- --   06/30/22 1730 112/65 -- -- (!) 118 (!) 42 96 % -- --   06/30/22 1715 112/72 -- -- (!) 115 (!) 41 96 % -- --   06/30/22 1703 (!) 110/55 -- -- (!) 115 -- -- -- --   06/30/22 1700 (!) 110/55 -- -- (!) 117 25 97 % -- --   06/30/22 1645 112/61 -- -- (!) 117 (!) 41 96 % -- --   06/30/22 1630 109/63 -- -- (!) 117 (!) 37 93 % -- --   06/30/22 1615 (!) 108/55 -- -- (!) 118 (!) 39 95 % -- --   06/30/22 1600 (!) 114/57 98 °F (36.7 °C) Axillary (!) 116 (!) 35 92 % 5' 6\" (1.676 m) 190 lb 12.8 oz (86.5 kg)   06/30/22 1530 (!) 114/56 -- -- (!) 112 (!) 39 94 % -- --   06/30/22 1515 117/61 -- -- (!) 116 (!) 32 95 % -- --   06/30/22 1500 (!) 104/59 -- -- (!) 114 (!) 37 92 % -- --   06/30/22 1445 112/64 -- -- (!) 113 27 95 % -- --   06/30/22 1431 (!) 104/52 -- -- (!) 108 (!) 33 92 % -- --   06/30/22 1430 (!) 109/52 -- -- (!) 112 (!) 33 92 % -- --   06/30/22 1415 (!) 112/58 -- -- (!) 116 27 93 % -- --   06/30/22 1400 (!) 104/58 -- -- (!) 114 28 95 % -- --   06/30/22 1352 -- -- -- (!) 116 26 96 % -- --   06/30/22 1345 -- -- -- (!) 112 27 96 % -- --   06/30/22 1315 108/76 -- -- (!) 117 23 97 % -- --   06/30/22 1300 107/72 -- -- (!) 116 23 96 % -- --   06/30/22 1246 -- -- -- (!) 107 19 96 % -- --   06/30/22 1245 114/61 -- -- (!) 106 25 95 % -- --   06/30/22 1234 (!) 114/55 -- -- (!) 120 29 96 % -- --   06/30/22 1215 (!) 144/118 -- -- (!) 119 21 93 % -- --   06/30/22 1200 113/87 -- -- (!) 119 25 93 % -- --   06/30/22 1155 -- -- -- (!) 112 (!) 31 94 % -- --   06/30/22 1145 118/77 -- -- (!) 121 24 94 % -- --   06/30/22 1130 115/72 -- -- (!) 116 24 96 % -- --   06/30/22 1115 (!) 115/93 -- -- (!) 116 21 94 % -- --   06/30/22 1100 130/85 -- -- -- -- -- -- --     General:  Skin:  abd rash. butcher erythema. No telangiectasias on the chest wall. HEENT: Sclerae icteric. No oral ulcers. No abnormal pigmentation of the lips. Cardiovascular: tachy. No murmurs, gallops, or rubs. PMI nondisplaced. Respiratory:  Comfortable breathing  With no accessory muscle use. Clear breath sounds with no rales bruits. GI:  Abdomen nondistended, soft, and minimally tender. No rebound Normal active bowel sounds. No enlargement of the liver or spleen. No masses palpable. Rectal:  Deferred  Musculoskeletal:  pitting edema of the lower legs. The neck is supple and extremities have good range of motion. No costovertebral tenderness. Neurological:  Gross memory appears intact. Patient is groggy and oriented. Psychiatric:  Mood appears appropriate with judgement intact. Lymphatic:  No cervical or supraclavicular adenopathy. Laboratory:    Lab Results   Component Value Date/Time    WBC 17.2 06/30/2022 10:15 AM    RBC 2.95 06/30/2022 10:15 AM    HGB 9.2 06/30/2022 10:15 AM    HCT 29.1 06/30/2022 10:15 AM     06/30/2022 10:15 AM     Lab Results   Component Value Date/Time     06/30/2022 10:15 AM    K 3.9 06/30/2022 10:15 AM     06/30/2022 10:15 AM    CO2 27 06/30/2022 10:15 AM    BUN 8 06/30/2022 10:15 AM    GFRAA >60 06/30/2022 10:15 AM    MG 2.2 06/30/2022 10:15 AM    PHOS 2.6 06/28/2022 09:20 AM    GLOB 3.5 06/30/2022 10:15 AM    ALT 41 06/30/2022 10:15 AM          Assessment/Plan:   Cannot populate problem list  Elevated bili; low albumin; coagulopathy ; transaminases not dramatic- ?cholestasis of sepsis; ?  Hepatic failure; no sxs of severe colitis to explain sepsis  Autoimmune hepatitis should have higher transaminitis   Doubt there are any acute options available with this picture and not sure that bxs will be helpful  Not likely acute IBD  Will start work up and review old studies    Hepatitis; iron; copper; BRIDGET- all negative  Hematology suad ongoing with path pending  Dr. Jonathan Richardson will follow up in AM  Past hx of substance abuse and chronic pain therapy until she broke her contract per note in [facility]

## 2022-06-30 NOTE — PROGRESS NOTES
TRANSFER - IN REPORT:    Verbal report received from [address] on [facility]  being received from ER for routine progression of patient care      Report consisted of patient's Situation, Background, Assessment and   Recommendations(SBAR). Information from the following report(s) Nurse Handoff Report, ED SBAR, Intake/Output, MAR, Recent Results and Cardiac Rhythm ST was reviewed with the receiving nurse. Opportunity for questions and clarification was provided. Assessment completed upon patient's arrival to unit and care assumed. Pt jaundiced with old flaky rash across torso and extremities. Abdomen distended and tender to palpation. BLE red and edematous. Purulent blisters present on bottom of R foot and bilateral lower legs. Sacrum with old flaky rash, Allevyn placed.  Pt lethargic and oriented to self on arrival.

## 2022-06-30 NOTE — ED NOTES
TRANSFER - OUT REPORT:    Verbal report given to Alanna Olivera on Aranza Connolly  being transferred to 3 ICU for routine progression of patient care       Report consisted of patient's Situation, Background, Assessment and   Recommendations(SBAR). Information from the following report(s) Nurse Handoff Report, ED Encounter Summary, ED SBAR, [facility] and Recent Results was reviewed with the receiving nurse. Lines:   CVC Triple Lumen 06/30/22 Right Internal jugular (Active)        Opportunity for questions and clarification was provided.       Patient transported with:  Monitor and Registered Nurse       Ashley Sandoval, RN  06/30/22 1380

## 2022-07-01 ENCOUNTER — APPOINTMENT (OUTPATIENT)
Dept: ULTRASOUND IMAGING | Age: 32
DRG: 441 | End: 2022-07-01
Payer: COMMERCIAL

## 2022-07-01 ENCOUNTER — APPOINTMENT (OUTPATIENT)
Dept: NON INVASIVE DIAGNOSTICS | Age: 32
DRG: 441 | End: 2022-07-01
Payer: COMMERCIAL

## 2022-07-01 PROBLEM — R74.01 TRANSAMINITIS: Status: ACTIVE | Noted: 2022-07-01

## 2022-07-01 PROBLEM — E80.6 HYPERBILIRUBINEMIA: Status: ACTIVE | Noted: 2022-07-01

## 2022-07-01 PROBLEM — E72.20 HYPERAMMONEMIA (HCC): Status: ACTIVE | Noted: 2022-07-01

## 2022-07-01 PROBLEM — E87.0 HYPERNATREMIA: Status: ACTIVE | Noted: 2022-07-01

## 2022-07-01 LAB
ALBUMIN SERPL-MCNC: 1.5 G/DL (ref 3.5–5)
ALBUMIN/GLOB SERPL: 0.5 {RATIO} (ref 1.2–3.5)
ALP SERPL-CCNC: 113 U/L (ref 50–136)
ALT SERPL-CCNC: 37 U/L (ref 12–65)
ANION GAP SERPL CALC-SCNC: 8 MMOL/L (ref 7–16)
APTT 1H P INC PPP: 45.9 SEC
APTT IMM NP PPP: 41.4 SEC
APTT PPP: 52 SEC (ref 23.4–34.5)
AST SERPL-CCNC: 51 U/L (ref 15–37)
B PERT DNA SPEC QL NAA+PROBE: NOT DETECTED
BASOPHILS # BLD: 0 K/UL (ref 0–0.2)
BASOPHILS NFR BLD: 0 % (ref 0–2)
BILIRUB SERPL-MCNC: 11 MG/DL (ref 0.2–1.1)
BORDETELLA PARAPERTUSSIS BY PCR: NOT DETECTED
BUN SERPL-MCNC: 6 MG/DL (ref 6–23)
C PNEUM DNA SPEC QL NAA+PROBE: NOT DETECTED
CALCIUM SERPL-MCNC: 7.2 MG/DL (ref 8.3–10.4)
CHLORIDE SERPL-SCNC: 114 MMOL/L (ref 98–107)
CO2 SERPL-SCNC: 26 MMOL/L (ref 21–32)
CREAT SERPL-MCNC: 0.7 MG/DL (ref 0.6–1)
DAT POLY-SP REAG RBC QL: NORMAL
DIFFERENTIAL METHOD BLD: ABNORMAL
ECHO AO ASC DIAM: 2.9 CM
ECHO AO ASCENDING AORTA INDEX: 1.48 CM/M2
ECHO AO ROOT DIAM: 2.9 CM
ECHO AO ROOT INDEX: 1.48 CM/M2
ECHO AV AREA PEAK VELOCITY: 2.6 CM2
ECHO AV AREA VTI: 2.9 CM2
ECHO AV AREA/BSA PEAK VELOCITY: 1.3 CM2/M2
ECHO AV AREA/BSA VTI: 1.5 CM2/M2
ECHO AV MEAN GRADIENT: 7 MMHG
ECHO AV MEAN VELOCITY: 1.3 M/S
ECHO AV PEAK GRADIENT: 12 MMHG
ECHO AV PEAK VELOCITY: 1.7 M/S
ECHO AV VELOCITY RATIO: 0.82
ECHO AV VTI: 28.3 CM
ECHO BSA: 2.01 M2
ECHO EST RA PRESSURE: 8 MMHG
ECHO IVC PROX: 2.9 CM
ECHO LA AREA 2C: 15.8 CM2
ECHO LA AREA 4C: 17.6 CM2
ECHO LA DIAMETER INDEX: 1.89 CM/M2
ECHO LA DIAMETER: 3.7 CM
ECHO LA MAJOR AXIS: 5.3 CM
ECHO LA MINOR AXIS: 5.1 CM
ECHO LA TO AORTIC ROOT RATIO: 1.28
ECHO LA VOL BP: 45 ML (ref 22–52)
ECHO LA VOL/BSA BIPLANE: 23 ML/M2 (ref 16–34)
ECHO LV E' LATERAL VELOCITY: 15 CM/S
ECHO LV E' SEPTAL VELOCITY: 14 CM/S
ECHO LV EDV 3D: 154 ML
ECHO LV EDV INDEX 3D: 79 ML/M2
ECHO LV EJECTION FRACTION 3D: 62 %
ECHO LV ESV 3D: 58 ML
ECHO LV ESV INDEX 3D: 30 ML/M2
ECHO LV FRACTIONAL SHORTENING: 34 % (ref 28–44)
ECHO LV INTERNAL DIMENSION DIASTOLE INDEX: 2.4 CM/M2
ECHO LV INTERNAL DIMENSION DIASTOLIC: 4.7 CM (ref 3.9–5.3)
ECHO LV INTERNAL DIMENSION SYSTOLIC INDEX: 1.58 CM/M2
ECHO LV INTERNAL DIMENSION SYSTOLIC: 3.1 CM
ECHO LV IVSD: 1 CM (ref 0.6–0.9)
ECHO LV MASS 2D: 164.5 G (ref 67–162)
ECHO LV MASS 3D INDEX: 74.5 G/M2
ECHO LV MASS 3D: 146 G
ECHO LV MASS INDEX 2D: 83.9 G/M2 (ref 43–95)
ECHO LV POSTERIOR WALL DIASTOLIC: 1 CM (ref 0.6–0.9)
ECHO LV RELATIVE WALL THICKNESS RATIO: 0.43
ECHO LVOT AREA: 3.1 CM2
ECHO LVOT AV VTI INDEX: 0.92
ECHO LVOT DIAM: 2 CM
ECHO LVOT MEAN GRADIENT: 4 MMHG
ECHO LVOT PEAK GRADIENT: 8 MMHG
ECHO LVOT PEAK VELOCITY: 1.4 M/S
ECHO LVOT STROKE VOLUME INDEX: 41.8 ML/M2
ECHO LVOT SV: 82 ML
ECHO LVOT VTI: 26.1 CM
ECHO MV A VELOCITY: 1.84 M/S
ECHO MV E DECELERATION TIME (DT): 122 MS
ECHO MV E VELOCITY: 1.31 M/S
ECHO MV E/A RATIO: 0.71
ECHO MV E/E' LATERAL: 8.73
ECHO MV E/E' RATIO (AVERAGED): 9.05
ECHO MV E/E' SEPTAL: 9.36
ECHO RIGHT VENTRICULAR SYSTOLIC PRESSURE (RVSP): 43 MMHG
ECHO RV BASAL DIMENSION: 3.1 CM
ECHO RV TAPSE: 2.6 CM (ref 1.7–?)
ECHO TV REGURGITANT MAX VELOCITY: 2.95 M/S
ECHO TV REGURGITANT PEAK GRADIENT: 35 MMHG
EOSINOPHIL # BLD: 0.1 K/UL (ref 0–0.8)
EOSINOPHIL NFR BLD: 0 % (ref 0.5–7.8)
ERYTHROCYTE [DISTWIDTH] IN BLOOD BY AUTOMATED COUNT: 27.2 % (ref 11.9–14.6)
FLUAV SUBTYP SPEC NAA+PROBE: NOT DETECTED
FLUBV RNA SPEC QL NAA+PROBE: NOT DETECTED
GLOBULIN SER CALC-MCNC: 3 G/DL (ref 2.3–3.5)
GLUCOSE SERPL-MCNC: 113 MG/DL (ref 65–100)
HADV DNA SPEC QL NAA+PROBE: NOT DETECTED
HCOV 229E RNA SPEC QL NAA+PROBE: NOT DETECTED
HCOV HKU1 RNA SPEC QL NAA+PROBE: NOT DETECTED
HCOV NL63 RNA SPEC QL NAA+PROBE: NOT DETECTED
HCOV OC43 RNA SPEC QL NAA+PROBE: NOT DETECTED
HCT VFR BLD AUTO: 23.7 % (ref 35.8–46.3)
HGB BLD-MCNC: 7.6 G/DL (ref 11.7–15.4)
HMPV RNA SPEC QL NAA+PROBE: NOT DETECTED
HPIV1 RNA SPEC QL NAA+PROBE: NOT DETECTED
HPIV2 RNA SPEC QL NAA+PROBE: NOT DETECTED
HPIV3 RNA SPEC QL NAA+PROBE: NOT DETECTED
HPIV4 RNA SPEC QL NAA+PROBE: NOT DETECTED
IMM GRANULOCYTES # BLD AUTO: 0.2 K/UL (ref 0–0.5)
IMM GRANULOCYTES NFR BLD AUTO: 1 % (ref 0–5)
INR PPP: 1.6
LYMPHOCYTES # BLD: 4.9 K/UL (ref 0.5–4.6)
LYMPHOCYTES NFR BLD: 26 % (ref 13–44)
M PNEUMO DNA SPEC QL NAA+PROBE: NOT DETECTED
MAGNESIUM SERPL-MCNC: 2 MG/DL (ref 1.8–2.4)
MCH RBC QN AUTO: 31.8 PG (ref 26.1–32.9)
MCHC RBC AUTO-ENTMCNC: 32.1 G/DL (ref 31.4–35)
MCV RBC AUTO: 99.2 FL (ref 79.6–97.8)
METHYLMALONATE SERPL-SCNC: 99 NMOL/L (ref 0–378)
MM INDURATION, POC: 0 MM (ref 0–5)
MONOCYTES # BLD: 1.4 K/UL (ref 0.1–1.3)
MONOCYTES NFR BLD: 7 % (ref 4–12)
NEUTS SEG # BLD: 12.4 K/UL (ref 1.7–8.2)
NEUTS SEG NFR BLD: 66 % (ref 43–78)
NRBC # BLD: 0 K/UL (ref 0–0.2)
PLATELET # BLD AUTO: 204 K/UL (ref 150–450)
PMV BLD AUTO: 12 FL (ref 9.4–12.3)
POTASSIUM SERPL-SCNC: 2.9 MMOL/L (ref 3.5–5.1)
POTASSIUM SERPL-SCNC: 3.2 MMOL/L (ref 3.5–5.1)
PPD, POC: NEGATIVE
PROT SERPL-MCNC: 4.5 G/DL (ref 6.3–8.2)
PROTHROMBIN TIME: 19.2 SEC (ref 12.6–14.5)
PT 1H P INC PPP: 16.7 SEC
PT IMM NP PPP: 17.1 SEC
PT MIXING STUDY: ABNORMAL
PTT MIXING STUDY: ABNORMAL
RBC # BLD AUTO: 2.39 M/UL (ref 4.05–5.2)
RSV RNA SPEC QL NAA+PROBE: NOT DETECTED
RV+EV RNA SPEC QL NAA+PROBE: NOT DETECTED
SARS-COV-2 RNA RESP QL NAA+PROBE: NOT DETECTED
SODIUM SERPL-SCNC: 148 MMOL/L (ref 136–145)
TRANSFERRIN SERPL-SCNC: 31.5 NMOL/L (ref 12.2–27.3)
VANCOMYCIN SERPL-MCNC: 19.3 UG/ML
WBC # BLD AUTO: 18.9 K/UL (ref 4.3–11.1)

## 2022-07-01 PROCEDURE — 93306 TTE W/DOPPLER COMPLETE: CPT

## 2022-07-01 PROCEDURE — 6360000002 HC RX W HCPCS: Performed by: HOSPITALIST

## 2022-07-01 PROCEDURE — 86638 Q FEVER ANTIBODY: CPT

## 2022-07-01 PROCEDURE — 85025 COMPLETE CBC W/AUTO DIFF WBC: CPT

## 2022-07-01 PROCEDURE — 93306 TTE W/DOPPLER COMPLETE: CPT | Performed by: INTERNAL MEDICINE

## 2022-07-01 PROCEDURE — 82390 ASSAY OF CERULOPLASMIN: CPT

## 2022-07-01 PROCEDURE — 86038 ANTINUCLEAR ANTIBODIES: CPT

## 2022-07-01 PROCEDURE — 1100000000 HC RM PRIVATE

## 2022-07-01 PROCEDURE — 85230 CLOT FACTOR VII PROCONVERTIN: CPT

## 2022-07-01 PROCEDURE — 86592 SYPHILIS TEST NON-TREP QUAL: CPT

## 2022-07-01 PROCEDURE — APPSS45 APP SPLIT SHARED TIME 31-45 MINUTES: Performed by: NURSE PRACTITIONER

## 2022-07-01 PROCEDURE — 86381 MITOCHONDRIAL ANTIBODY EACH: CPT

## 2022-07-01 PROCEDURE — 86880 COOMBS TEST DIRECT: CPT

## 2022-07-01 PROCEDURE — 80074 ACUTE HEPATITIS PANEL: CPT

## 2022-07-01 PROCEDURE — 6370000000 HC RX 637 (ALT 250 FOR IP): Performed by: INTERNAL MEDICINE

## 2022-07-01 PROCEDURE — 51798 US URINE CAPACITY MEASURE: CPT

## 2022-07-01 PROCEDURE — 86611 BARTONELLA ANTIBODY: CPT

## 2022-07-01 PROCEDURE — 80202 ASSAY OF VANCOMYCIN: CPT

## 2022-07-01 PROCEDURE — 99222 1ST HOSP IP/OBS MODERATE 55: CPT | Performed by: INTERNAL MEDICINE

## 2022-07-01 PROCEDURE — 93970 EXTREMITY STUDY: CPT

## 2022-07-01 PROCEDURE — 6360000002 HC RX W HCPCS: Performed by: FAMILY MEDICINE

## 2022-07-01 PROCEDURE — 2580000003 HC RX 258: Performed by: INTERNAL MEDICINE

## 2022-07-01 PROCEDURE — 6370000000 HC RX 637 (ALT 250 FOR IP): Performed by: FAMILY MEDICINE

## 2022-07-01 PROCEDURE — 0202U NFCT DS 22 TRGT SARS-COV-2: CPT

## 2022-07-01 PROCEDURE — 2580000003 HC RX 258: Performed by: FAMILY MEDICINE

## 2022-07-01 PROCEDURE — 86644 CMV ANTIBODY: CPT

## 2022-07-01 PROCEDURE — 6370000000 HC RX 637 (ALT 250 FOR IP)

## 2022-07-01 PROCEDURE — 82180 ASSAY OF ASCORBIC ACID: CPT

## 2022-07-01 PROCEDURE — 36592 COLLECT BLOOD FROM PICC: CPT

## 2022-07-01 PROCEDURE — 86364 TISS TRNSGLTMNASE EA IG CLAS: CPT

## 2022-07-01 PROCEDURE — 51701 INSERT BLADDER CATHETER: CPT

## 2022-07-01 PROCEDURE — 83735 ASSAY OF MAGNESIUM: CPT

## 2022-07-01 PROCEDURE — 85598 HEXAGNAL PHOSPH PLTLT NEUTRL: CPT

## 2022-07-01 PROCEDURE — 86015 ACTIN ANTIBODY EACH: CPT

## 2022-07-01 PROCEDURE — 86360 T CELL ABSOLUTE COUNT/RATIO: CPT

## 2022-07-01 PROCEDURE — 85610 PROTHROMBIN TIME: CPT

## 2022-07-01 PROCEDURE — 6360000002 HC RX W HCPCS: Performed by: INTERNAL MEDICINE

## 2022-07-01 PROCEDURE — 76705 ECHO EXAM OF ABDOMEN: CPT

## 2022-07-01 PROCEDURE — 80053 COMPREHEN METABOLIC PANEL: CPT

## 2022-07-01 PROCEDURE — 85613 RUSSELL VIPER VENOM DILUTED: CPT

## 2022-07-01 PROCEDURE — 85730 THROMBOPLASTIN TIME PARTIAL: CPT

## 2022-07-01 PROCEDURE — 36415 COLL VENOUS BLD VENIPUNCTURE: CPT

## 2022-07-01 PROCEDURE — 85732 THROMBOPLASTIN TIME PARTIAL: CPT

## 2022-07-01 PROCEDURE — 76376 3D RENDER W/INTRP POSTPROCES: CPT | Performed by: INTERNAL MEDICINE

## 2022-07-01 PROCEDURE — 84132 ASSAY OF SERUM POTASSIUM: CPT

## 2022-07-01 RX ORDER — POTASSIUM CHLORIDE 20 MEQ/1
40 TABLET, EXTENDED RELEASE ORAL PRN
Status: DISCONTINUED | OUTPATIENT
Start: 2022-07-01 | End: 2022-07-02 | Stop reason: HOSPADM

## 2022-07-01 RX ORDER — ZINC OXIDE
OINTMENT (GRAM) TOPICAL 4 TIMES DAILY PRN
Status: DISCONTINUED | OUTPATIENT
Start: 2022-07-01 | End: 2022-07-02 | Stop reason: HOSPADM

## 2022-07-01 RX ORDER — LACTULOSE 10 G/15ML
20 SOLUTION ORAL 4 TIMES DAILY
Status: DISCONTINUED | OUTPATIENT
Start: 2022-07-01 | End: 2022-07-02 | Stop reason: HOSPADM

## 2022-07-01 RX ORDER — MAGNESIUM SULFATE IN WATER 40 MG/ML
2000 INJECTION, SOLUTION INTRAVENOUS PRN
Status: DISCONTINUED | OUTPATIENT
Start: 2022-07-01 | End: 2022-07-02 | Stop reason: HOSPADM

## 2022-07-01 RX ORDER — DEXTROSE AND POTASSIUM CHLORIDE 5; .15 G/100ML; G/100ML
SOLUTION INTRAVENOUS CONTINUOUS
Status: DISCONTINUED | OUTPATIENT
Start: 2022-07-01 | End: 2022-07-02 | Stop reason: HOSPADM

## 2022-07-01 RX ORDER — LORAZEPAM 0.5 MG/1
0.5 TABLET ORAL EVERY 6 HOURS PRN
Status: DISCONTINUED | OUTPATIENT
Start: 2022-07-01 | End: 2022-07-02 | Stop reason: HOSPADM

## 2022-07-01 RX ORDER — LORAZEPAM 0.5 MG/1
0.5 TABLET ORAL ONCE
Status: COMPLETED | OUTPATIENT
Start: 2022-07-01 | End: 2022-07-01

## 2022-07-01 RX ORDER — LACTULOSE 10 G/15ML
20 SOLUTION ORAL; RECTAL 3 TIMES DAILY
Status: DISCONTINUED | OUTPATIENT
Start: 2022-07-01 | End: 2022-07-01

## 2022-07-01 RX ORDER — POTASSIUM CHLORIDE 7.45 MG/ML
10 INJECTION INTRAVENOUS PRN
Status: DISCONTINUED | OUTPATIENT
Start: 2022-07-01 | End: 2022-07-02 | Stop reason: HOSPADM

## 2022-07-01 RX ORDER — NYSTATIN 100000 U/G
OINTMENT TOPICAL 2 TIMES DAILY PRN
Status: DISCONTINUED | OUTPATIENT
Start: 2022-07-01 | End: 2022-07-02 | Stop reason: HOSPADM

## 2022-07-01 RX ADMIN — LORAZEPAM 0.5 MG: 0.5 TABLET ORAL at 12:08

## 2022-07-01 RX ADMIN — LORAZEPAM 0.5 MG: 0.5 TABLET ORAL at 16:38

## 2022-07-01 RX ADMIN — MORPHINE SULFATE 2 MG: 2 INJECTION, SOLUTION INTRAMUSCULAR; INTRAVENOUS at 05:45

## 2022-07-01 RX ADMIN — MORPHINE SULFATE 2 MG: 2 INJECTION, SOLUTION INTRAMUSCULAR; INTRAVENOUS at 19:39

## 2022-07-01 RX ADMIN — POTASSIUM CHLORIDE 10 MEQ: 7.46 INJECTION, SOLUTION INTRAVENOUS at 07:44

## 2022-07-01 RX ADMIN — VANCOMYCIN HYDROCHLORIDE 1250 MG: 10 INJECTION, POWDER, LYOPHILIZED, FOR SOLUTION INTRAVENOUS at 00:30

## 2022-07-01 RX ADMIN — LACTULOSE: 10 SOLUTION ORAL; RECTAL at 09:41

## 2022-07-01 RX ADMIN — SODIUM CHLORIDE, PRESERVATIVE FREE 5 ML: 5 INJECTION INTRAVENOUS at 21:38

## 2022-07-01 RX ADMIN — LACTULOSE 20 G: 20 SOLUTION ORAL at 13:25

## 2022-07-01 RX ADMIN — VANCOMYCIN HYDROCHLORIDE 1000 MG: 1 INJECTION, POWDER, LYOPHILIZED, FOR SOLUTION INTRAVENOUS at 12:30

## 2022-07-01 RX ADMIN — POTASSIUM CHLORIDE 10 MEQ: 7.46 INJECTION, SOLUTION INTRAVENOUS at 05:48

## 2022-07-01 RX ADMIN — MORPHINE SULFATE 2 MG: 2 INJECTION, SOLUTION INTRAMUSCULAR; INTRAVENOUS at 00:29

## 2022-07-01 RX ADMIN — DEXTROSE AND SODIUM CHLORIDE: 5; 450 INJECTION, SOLUTION INTRAVENOUS at 08:30

## 2022-07-01 RX ADMIN — POTASSIUM CHLORIDE 10 MEQ: 7.46 INJECTION, SOLUTION INTRAVENOUS at 09:55

## 2022-07-01 RX ADMIN — MORPHINE SULFATE 2 MG: 2 INJECTION, SOLUTION INTRAMUSCULAR; INTRAVENOUS at 12:08

## 2022-07-01 RX ADMIN — POTASSIUM CHLORIDE AND DEXTROSE MONOHYDRATE: 150; 5 INJECTION, SOLUTION INTRAVENOUS at 11:06

## 2022-07-01 RX ADMIN — DEXTROSE AND SODIUM CHLORIDE: 5; 450 INJECTION, SOLUTION INTRAVENOUS at 00:20

## 2022-07-01 RX ADMIN — PIPERACILLIN AND TAZOBACTAM 3375 MG: 3; .375 INJECTION, POWDER, LYOPHILIZED, FOR SOLUTION INTRAVENOUS at 07:45

## 2022-07-01 RX ADMIN — MORPHINE SULFATE 2 MG: 2 INJECTION, SOLUTION INTRAMUSCULAR; INTRAVENOUS at 09:40

## 2022-07-01 RX ADMIN — SODIUM CHLORIDE, PRESERVATIVE FREE 10 ML: 5 INJECTION INTRAVENOUS at 08:30

## 2022-07-01 RX ADMIN — PIPERACILLIN AND TAZOBACTAM 3375 MG: 3; .375 INJECTION, POWDER, LYOPHILIZED, FOR SOLUTION INTRAVENOUS at 15:50

## 2022-07-01 RX ADMIN — POTASSIUM CHLORIDE 10 MEQ: 7.46 INJECTION, SOLUTION INTRAVENOUS at 06:53

## 2022-07-01 RX ADMIN — POTASSIUM CHLORIDE 10 MEQ: 7.46 INJECTION, SOLUTION INTRAVENOUS at 11:03

## 2022-07-01 RX ADMIN — POTASSIUM CHLORIDE 10 MEQ: 7.46 INJECTION, SOLUTION INTRAVENOUS at 08:52

## 2022-07-01 ASSESSMENT — PAIN - FUNCTIONAL ASSESSMENT: PAIN_FUNCTIONAL_ASSESSMENT: PREVENTS OR INTERFERES WITH MANY ACTIVE NOT PASSIVE ACTIVITIES

## 2022-07-01 ASSESSMENT — PAIN SCALES - GENERAL
PAINLEVEL_OUTOF10: 9
PAINLEVEL_OUTOF10: 7
PAINLEVEL_OUTOF10: 8
PAINLEVEL_OUTOF10: 2
PAINLEVEL_OUTOF10: 2
PAINLEVEL_OUTOF10: 7
PAINLEVEL_OUTOF10: 7
PAINLEVEL_OUTOF10: 4
PAINLEVEL_OUTOF10: 3
PAINLEVEL_OUTOF10: 7

## 2022-07-01 ASSESSMENT — PAIN DESCRIPTION - LOCATION
LOCATION: ABDOMEN

## 2022-07-01 ASSESSMENT — PAIN DESCRIPTION - DESCRIPTORS
DESCRIPTORS: ACHING;CRAMPING;PRESSURE
DESCRIPTORS: ACHING
DESCRIPTORS: ACHING;CRAMPING
DESCRIPTORS: ACHING

## 2022-07-01 ASSESSMENT — PAIN DESCRIPTION - ORIENTATION
ORIENTATION: ANTERIOR
ORIENTATION: ANTERIOR
ORIENTATION: ANTERIOR;LOWER;MID
ORIENTATION: ANTERIOR;LOWER

## 2022-07-01 ASSESSMENT — ENCOUNTER SYMPTOMS
RESPIRATORY NEGATIVE: 1
POOR WOUND HEALING: 1
EYES NEGATIVE: 1
HEARTBURN: 0
VOMITING: 0
NAUSEA: 1
COLOR CHANGE: 1
JAUNDICE: 1

## 2022-07-01 ASSESSMENT — PAIN DESCRIPTION - ONSET: ONSET: ON-GOING

## 2022-07-01 ASSESSMENT — PAIN DESCRIPTION - FREQUENCY
FREQUENCY: INTERMITTENT
FREQUENCY: CONTINUOUS

## 2022-07-01 ASSESSMENT — PAIN DESCRIPTION - PAIN TYPE
TYPE: ACUTE PAIN
TYPE: ACUTE PAIN

## 2022-07-01 NOTE — PROGRESS NOTES
Hospitalist Progress Note   Admit Date:  2022  9:47 AM   Name:  Christian Finley   Age:  36 y.o. Sex:  female  :  1990   MRN:  [id number]   Room:      Reason(s) for Admission: Severe sepsis ([facility] [A41.9, R65.20]     Hospital Course & Interval History:   Ms. Jay Gamboa is a 35 y/o female with a h/o HTN, anxiety, gastric bypass and macrocytic anemia who was admitted to our service on  with acute metabolic encephalopathy. Has been following Hematology outpatient for possible hemolytic anemia. She was found at home by family confused and jaundiced. Labs showed bili 13.9. Head CT unremarkable. Hematology and GI consulted. She was admitted to ICU. Transfer orders for the floor on . Subjective/24hr Events (22):  SOB/anxious. Was resting comfortably earlier, main issue seems to be abdominal pain. She will follow simple commands like squeezing hands, moving limbs and sticking tongue out; though won't open eyes. Assessment & Plan:   # Severe sepsis   - Leukocytosis + RR >20 + acute encephalopathy.   - Pulmonary source? CT c/a/p showed b/l upper lobe consolidations, pan-colitis and hepatic steatosis. Resp viral swab is negative. Con't abx. ID was consulted. - Blood cultures are negative so far. TTE noted a \"possible vegetation in parasternal long axis view\", and was otherwise normal. Given the complicated clinical situation I will consult Cardiology for consideration of NE. # Macrocytic anemia   - Baseline Hb looks to be around 7, currently 7.6. Hematology involved and appreciated. # Hyperbilirubinemia // hyperammonemia   - GI following. Bili improved today .   - Lactulose    # B/l LE edema   - Low albumin. Check US to r/o DVT. Echo as above. # HypoK   - Better, replaced IV today. # HyperNa   - Change IVFs to D5. Labs tomorrow. Discharge Planning: Home when able.   Diet:  Diet NPO  DVT PPx: SCD if able to tolerate due to pain  Code status: [place] Problems           Last Modified POA    * (Principal) Severe sepsis ([facility] 6/30/2022 Yes    Hypoalbuminemia 7/1/2022 Yes    Normocytic anemia 7/1/2022 Yes    Bilateral leg edema 7/1/2022 Yes    Elevated bilirubin 6/30/2022 Yes    Acute hypokalemia 7/1/2022 Yes    Macrocytic anemia 6/30/2022 Yes    HCAP (healthcare-associated pneumonia) 6/30/2022 Yes    UTI (urinary tract infection) 6/30/2022 Yes    Colitis 6/30/2022 Yes    Hemolytic anemia ([facility] 6/30/2022 Yes    Low serum copper for age 6/30/2022 Yes    Hypernatremia 7/1/2022 Yes    Hyperbilirubinemia 7/1/2022 Yes    Transaminitis 7/1/2022 Yes    Hyperammonemia ([facility] 7/1/2022 Yes    Status post gastric bypass for obesity 6/30/2022 Yes    Essential hypertension 6/30/2022 Yes    Overview Signed 3/19/2022  6:45 PM by Doris Wise     Last Assessment & Plan:   Has been having elevated BP readings in doctors offices over the past few   months.    Tried lisinopril without AE, changed to amlodipine 5 mg daily which she is   tolerating well no constipation or LE edema  No longer having headaches in the mornings  Insurance company wouldn't fill her BP cuff  Plan: ambulatory monitoring, continue current therapy                 Objective:     Patient Vitals for the past 24 hrs:   Temp Pulse Resp BP SpO2   07/01/22 1512 98.6 °F (37 °C) (!) 125 29 123/83 96 %   07/01/22 1433 -- (!) 121 28 117/60 96 %   07/01/22 1403 -- (!) 120 (!) 31 (!) 116/59 95 %   07/01/22 1333 -- (!) 114 (!) 45 130/63 92 %   07/01/22 1303 -- (!) 110 -- 121/74 95 %   07/01/22 1233 -- (!) 110 (!) 36 115/75 96 %   07/01/22 1208 98.5 °F (36.9 °C) -- -- -- --   07/01/22 1203 -- (!) 113 (!) 41 113/77 96 %   07/01/22 1200 -- (!) 115 -- -- --   07/01/22 1133 -- (!) 116 (!) 34 129/73 97 %   07/01/22 1103 -- (!) 118 (!) 36 116/61 94 %   07/01/22 1033 -- (!) 120 (!) 45 (!) 125/58 93 %   07/01/22 1003 -- (!) 125 (!) 43 135/74 94 %   07/01/22 0940 -- (!) 123 -- -- 94 %   07/01/22 0933 -- (!) 122 (!) 44 131/68 94 % 07/01/22 0903 -- (!) 119 (!) 48 123/73 95 %   07/01/22 0832 98.4 °F (36.9 °C) (!) 119 29 (!) 118/56 96 %   07/01/22 0815 -- (!) 118 -- -- --   07/01/22 0803 -- (!) 117 (!) 38 122/66 95 %   07/01/22 0748 -- (!) 119 (!) 42 125/62 95 %   07/01/22 0732 -- (!) 118 (!) 39 (!) 114/57 94 %   07/01/22 0717 -- (!) 118 (!) 46 (!) 114/56 94 %   07/01/22 0703 -- (!) 119 -- (!) 121/59 95 %   07/01/22 0633 -- (!) 117 28 129/63 94 %   07/01/22 0631 -- -- 30 -- --   07/01/22 0617 -- (!) 118 -- (!) 115/57 94 %   07/01/22 0603 -- (!) 118 (!) 42 (!) 117/59 94 %   07/01/22 0548 -- (!) 120 -- (!) 113/59 94 %   07/01/22 0546 -- -- 30 -- --   07/01/22 0533 -- (!) 120 (!) 51 (!) 112/56 94 %   07/01/22 0517 -- (!) 114 (!) 49 (!) 118/55 94 %   07/01/22 0503 -- (!) 114 (!) 35 (!) 109/56 94 %   07/01/22 0448 -- (!) 114 (!) 32 (!) 110/58 93 %   07/01/22 0432 -- (!) 116 (!) 36 (!) 115/59 94 %   07/01/22 0417 -- (!) 113 29 (!) 115/55 94 %   07/01/22 0403 -- (!) 113 (!) 35 (!) 114/58 94 %   07/01/22 0348 -- (!) 111 (!) 38 (!) 114/59 95 %   07/01/22 0333 -- (!) 113 (!) 32 118/60 96 %   07/01/22 0317 -- (!) 114 -- 111/63 97 %   07/01/22 0316 -- -- 27 -- --   07/01/22 0309 98.4 °F (36.9 °C) (!) 117 (!) 33 119/64 --   07/01/22 0303 -- (!) 117 21 119/64 --   07/01/22 0248 -- (!) 120 -- 115/60 --   07/01/22 0233 -- (!) 121 12 114/70 --   07/01/22 0217 -- (!) 121 26 110/62 95 %   07/01/22 0204 -- -- 30 -- --   07/01/22 0203 -- (!) 117 -- (!) 113/57 94 %   07/01/22 0148 -- (!) 115 -- (!) 112/57 94 %   07/01/22 0133 -- (!) 116 -- (!) 108/55 93 %   07/01/22 0117 -- (!) 117 -- 115/61 94 %   07/01/22 0104 -- (!) 116 -- (!) 125/58 95 %   07/01/22 0048 -- (!) 115 -- (!) 118/59 95 %   07/01/22 0033 -- (!) 117 (!) 40 118/61 95 %   07/01/22 0029 -- -- 16 -- --   07/01/22 0017 -- (!) 121 26 (!) 113/59 94 %   07/01/22 0003 -- (!) 118 -- (!) 102/52 92 %   06/30/22 2348 -- (!) 118 -- (!) 98/51 93 %   06/30/22 2333 -- (!) 117 -- (!) 100/49 92 %   06/30/22 2317 -- Lars Diamond 116 -- (!) 102/55 93 %   06/30/22 2307 98.7 °F (37.1 °C) (!) 118 22 (!) 112/55 93 %   06/30/22 2303 -- (!) 116 19 (!) 112/55 93 %   06/30/22 2248 -- (!) 116 -- (!) 113/56 92 %   06/30/22 2232 -- (!) 116 -- (!) 111/58 92 %   06/30/22 2217 -- (!) 116 (!) 37 (!) 117/58 93 %   06/30/22 2203 -- (!) 116 (!) 34 (!) 120/56 93 %   06/30/22 2032 -- (!) 122 27 131/63 95 %   06/30/22 2017 -- (!) 121 (!) 42 (!) 123/54 97 %   06/30/22 2003 -- (!) 120 26 (!) 123/58 96 %   06/30/22 1948 -- (!) 121 (!) 31 (!) 122/59 97 %   06/30/22 1933 -- (!) 119 24 120/61 96 %   06/30/22 1925 -- (!) 118 18 -- 98 %   06/30/22 1920 98.5 °F (36.9 °C) (!) 117 (!) 35 120/61 98 %   06/30/22 1902 -- (!) 119 (!) 40 114/63 93 %   06/30/22 1848 -- (!) 119 (!) 50 (!) 114/56 98 %   06/30/22 1815 -- (!) 115 22 119/72 98 %   06/30/22 1800 -- (!) 116 30 117/62 98 %   06/30/22 1745 -- (!) 117 (!) 40 114/63 97 %   06/30/22 1730 -- (!) 118 (!) 42 112/65 96 %   06/30/22 1715 -- (!) 115 (!) 41 112/72 96 %   06/30/22 1703 -- (!) 115 -- (!) 110/55 --   06/30/22 1700 -- (!) 117 25 (!) 110/55 97 %   06/30/22 1645 -- (!) 117 (!) 41 112/61 96 %   06/30/22 1630 -- (!) 117 (!) 37 109/63 93 %   06/30/22 1615 -- (!) 118 (!) 39 (!) 108/55 95 %   06/30/22 1600 98 °F (36.7 °C) (!) 116 (!) 35 (!) 114/57 92 %       Estimated body mass index is 30.8 kg/m² as calculated from the following:    Height as of this encounter: 5' 6\" (1.676 m). Weight as of this encounter: 190 lb 12.8 oz (86.5 kg). Intake/Output Summary (Last 24 hours) at 7/1/2022 1556  Last data filed at 7/1/2022 0608  Gross per 24 hour   Intake 2069.43 ml   Output 728 ml   Net 1341.43 ml         Physical Exam:   Blood pressure 123/83, pulse (!) 125, temperature 98.6 °F (37 °C), temperature source Axillary, resp. rate 29, height 5' 6\" (1.676 m), weight 190 lb 12.8 oz (86.5 kg), last menstrual period 06/07/2022, SpO2 96 %. General:    Well nourished. Ill appearing. Follows commands.   Head:  Normocephalic, atraumatic. Eyes:  Sclerae appear normal. Pupils equally round. ENT:  Nares appear normal, no drainage. Dry oral mucosa  Neck:  No restricted ROM. Trachea midline. CV:   Tachycardia. No m/r/g. No jugular venous distension. Lungs:   CTAB. No wheezing, rhonchi, or rales. Tachypnea at rest.  Abdomen: Bowel sounds present. Soft, nondistended. Generalized tenderness to palpation. Extremities: No cyanosis or clubbing. No edema. Skin:     Warm and dry. Jaundice, dark reddish rash to LEs and abdomen. Neuro:  Unable to fully assess due to patient condition, however she will follow commands and move all extremities spontaneously. Psych:  As above.     I have reviewed ordered lab tests and independently visualized imaging below:    Recent Labs:  Recent Results (from the past 48 hour(s))   EKG 12 Lead    Collection Time: 06/30/22  9:53 AM   Result Value Ref Range    Ventricular Rate 120 BPM    Atrial Rate 118 BPM    P-R Interval 82 ms    QRS Duration 95 ms    Q-T Interval 365 ms    QTc Calculation (Bazett) 516 ms    P Axis 0 degrees    R Axis 71 degrees    T Axis -54 degrees    Diagnosis Sinus tachycardia    Urinalysis    Collection Time: 06/30/22  9:59 AM   Result Value Ref Range    Color, UA ORANGE      Appearance TURBID      Specific Gravity, UA 1.021 1.001 - 1.023      pH, Urine 7.5 5.0 - 9.0      Protein,  (A) NEG mg/dL    Glucose, UA Negative mg/dL    Ketones, Urine Negative NEG mg/dL    Bilirubin Urine LARGE (A) NEG      Blood, Urine Negative NEG      Urobilinogen, Urine 2.0 (H) 0.2 - 1.0 EU/dL    Nitrite, Urine Positive (A) NEG      Leukocyte Esterase, Urine MODERATE (A) NEG      WBC, UA 0-3 0 /hpf    RBC, UA 0-3 0 /hpf    Epithelial Cells UA 0-3 0 /hpf    BACTERIA, URINE 4+ (H) 0 /hpf    Casts HYALINE 0 /lpf   Lactic Acid    Collection Time: 06/30/22  9:59 AM   Result Value Ref Range    Lactic Acid, Plasma 3.4 (H) 0.4 - 2.0 MMOL/L   Urine Drug Screen    Collection Time: 06/30/22  9:59 AM   Result Value Ref Range    PCP, Urine Negative      Benzodiazepines, Urine Negative      Cocaine, Urine Negative      Amphetamine, Urine Negative      Methadone, Urine Negative      THC, TH-Cannabinol, Urine Positive      Opiates, Urine Positive      Barbiturates, Urine Negative     Salicylate    Collection Time: 06/30/22  9:59 AM   Result Value Ref Range    Salicylate, Serum <7.8 (L) 2.8 - 20.0 MG/DL   Ammonia    Collection Time: 06/30/22  9:59 AM   Result Value Ref Range    Ammonia 95 (H) 11 - 32 UMOL/L   CBC with Auto Differential    Collection Time: 06/30/22 10:15 AM   Result Value Ref Range    WBC 17.2 (H) 4.3 - 11.1 K/uL    RBC 2.95 (L) 4.05 - 5.2 M/uL    Hemoglobin 9.2 (L) 11.7 - 15.4 g/dL    Hematocrit 29.1 (L) 35.8 - 46.3 %    MCV 98.6 (H) 79.6 - 97.8 FL    MCH 31.2 26.1 - 32.9 PG    MCHC 31.6 31.4 - 35.0 g/dL    RDW 26.5 (H) 11.9 - 14.6 %    Platelets 921 427 - 471 K/uL    MPV 11.7 9.4 - 12.3 FL    nRBC 0.00 0.0 - 0.2 K/uL    Differential Type AUTOMATED      Seg Neutrophils 77 43 - 78 %    Lymphocytes 15 13 - 44 %    Monocytes 7 4.0 - 12.0 %    Eosinophils % 0 (L) 0.5 - 7.8 %    Basophils 0 0.0 - 2.0 %    Immature Granulocytes 1 0.0 - 5.0 %    Segs Absolute 13.3 (H) 1.7 - 8.2 K/UL    Absolute Lymph # 2.5 0.5 - 4.6 K/UL    Absolute Mono # 1.2 0.1 - 1.3 K/UL    Absolute Eos # 0.0 0.0 - 0.8 K/UL    Basophils Absolute 0.0 0.0 - 0.2 K/UL    Absolute Immature Granulocyte 0.2 0.0 - 0.5 K/UL   Comprehensive Metabolic Panel    Collection Time: 06/30/22 10:15 AM   Result Value Ref Range    Sodium 145 136 - 145 mmol/L    Potassium 3.9 3.5 - 5.1 mmol/L    Chloride 109 (H) 98 - 107 mmol/L    CO2 27 21 - 32 mmol/L    Anion Gap 9 7 - 16 mmol/L    Glucose 83 65 - 100 mg/dL    BUN 8 6 - 23 MG/DL    CREATININE 0.70 0.6 - 1.0 MG/DL    GFR African American >60 >60 ml/min/1.73m2    GFR Non- >60 >60 ml/min/1.73m2    Calcium 8.1 (L) 8.3 - 10.4 MG/DL    Total Bilirubin 13.9 (H) 0.2 - 1.1 MG/DL    ALT 41 12 - 65 U/L    AST 59 (H) 15 - 37 U/L    Alk Phosphatase 141 (H) 50 - 136 U/L    Total Protein 5.4 (L) 6.3 - 8.2 g/dL    Albumin 1.9 (L) 3.5 - 5.0 g/dL    Globulin 3.5 2.3 - 3.5 g/dL    Albumin/Globulin Ratio 0.5 (L) 1.2 - 3.5     Troponin    Collection Time: 06/30/22 10:15 AM   Result Value Ref Range    Troponin, High Sensitivity 9.8 0 - 14 pg/mL   Magnesium    Collection Time: 06/30/22 10:15 AM   Result Value Ref Range    Magnesium 2.2 1.8 - 2.4 mg/dL   Protime-INR    Collection Time: 06/30/22 10:15 AM   Result Value Ref Range    Protime 19.2 (H) 12.6 - 14.5 sec    INR 1.6     Procalcitonin    Collection Time: 06/30/22 10:15 AM   Result Value Ref Range    Procalcitonin 0.35 0.00 - 0.49 ng/mL   CK    Collection Time: 06/30/22 10:15 AM   Result Value Ref Range    Total  21 - 215 U/L   Acetaminophen Level    Collection Time: 06/30/22 10:15 AM   Result Value Ref Range    Acetaminophen Level <2 (L) 10.0 - 30.0 ug/mL   Ethanol    Collection Time: 06/30/22 10:15 AM   Result Value Ref Range    Ethanol Lvl <3 MG/DL   Lactate Dehydrogenase    Collection Time: 06/30/22 10:15 AM   Result Value Ref Range     (H) 100 - 190 U/L   Haptoglobin    Collection Time: 06/30/22 10:15 AM   Result Value Ref Range    Haptoglobin <8 (L) 30 - 200 mg/dL   Bilirubin, Direct    Collection Time: 06/30/22 10:15 AM   Result Value Ref Range    Bilirubin, Direct 9.3 (H) <0.4 MG/DL   proBNP, N-TERMINAL    Collection Time: 06/30/22 10:15 AM   Result Value Ref Range    NT Pro- (H) 5 - 125 PG/ML   POC CHEM 8    Collection Time: 06/30/22 10:55 AM   Result Value Ref Range    POC Sodium 149 (H) 136 - 145 mmol/L    POC Potassium 4.3 3.5 - 5.1 mmol/L    POC Chloride 110 (H) 98 - 107 mmol/L    POC TCO2 25.3 21 - 32 mmol/L    POC Glucose 72 65 - 100 mg/dL    POC BUN 6 (L) 8 - 26 mg/dL    POC Creatinine 0.66 (L) 0.8 - 1.5 mg/dL    POC GFR African American >60 >60 ml/min/1.73m2    Glomerular Filtration Rate, POC >60 >60 ml/min/1.73m2   Culture, Blood 1 Collection Time: 06/30/22 11:45 AM    Specimen: Blood   Result Value Ref Range    Special Requests CENTRAL      Culture NO GROWTH AFTER 19 HOURS     POC Blood, Cord, Arterial    Collection Time: 06/30/22  1:03 PM   Result Value Ref Range    DEVICE ROOM AIR      FIO2 21 %    pH, Arterial, POC 7.53 (H) 7.35 - 7.45      pCO2, Arterial, POC 29.8 (L) 35 - 45 MMHG    pO2, Arterial, POC 73 (L) 75 - 100 MMHG    HCO3, Mixed 25.1 22 - 26 MMOL/L    SO2c, Arterial, POC 96.3 95 - 98 %    Base Excess 2.5 mmol/L    POC Oliver's Test Positive      Site RIGHT RADIAL      Specimen type: ARTERIAL      Performed by: Molly     POC TCO2 25 (H) 13 - 23 MMOL/L   Lactic Acid    Collection Time: 06/30/22  2:48 PM   Result Value Ref Range    Lactic Acid, Plasma 1.7 0.4 - 2.0 MMOL/L   Ethanol    Collection Time: 06/30/22  4:49 PM   Result Value Ref Range    Ethanol Lvl <3 MG/DL   Culture, Blood 1    Collection Time: 06/30/22  5:10 PM    Specimen: Blood   Result Value Ref Range    Special Requests RIGHT  HAND        Culture NO GROWTH AFTER 13 HOURS     Culture, Urine    Collection Time: 06/30/22  5:28 PM    Specimen: URINE-CLEAN CATCH    URINE   Result Value Ref Range    Special Requests NO SPECIAL REQUESTS      Culture        No growth after short period of incubation. Further results to follow after overnight incubation.    Drug Screen Psych, Urine    Collection Time: 06/30/22  5:28 PM   Result Value Ref Range    Benzodiazepines, Urine Negative      Opiates, Urine Positive      Amphetamine, Urine Negative      Cocaine, Urine Negative      THC, TH-Cannabinol, Urine Positive     Comprehensive Metabolic Panel w/ Reflex to MG    Collection Time: 07/01/22  3:26 AM   Result Value Ref Range    Sodium 148 (H) 136 - 145 mmol/L    Potassium 2.9 (L) 3.5 - 5.1 mmol/L    Chloride 114 (H) 98 - 107 mmol/L    CO2 26 21 - 32 mmol/L    Anion Gap 8 7 - 16 mmol/L    Glucose 113 (H) 65 - 100 mg/dL    BUN 6 6 - 23 MG/DL    CREATININE 0.70 0.6 - 1.0 MG/DL GFR African American >60 >60 ml/min/1.73m2    GFR Non- >60 >60 ml/min/1.73m2    Calcium 7.2 (L) 8.3 - 10.4 MG/DL    Total Bilirubin 11.0 (H) 0.2 - 1.1 MG/DL    ALT 37 12 - 65 U/L    AST 51 (H) 15 - 37 U/L    Alk Phosphatase 113 50 - 136 U/L    Total Protein 4.5 (L) 6.3 - 8.2 g/dL    Albumin 1.5 (L) 3.5 - 5.0 g/dL    Globulin 3.0 2.3 - 3.5 g/dL    Albumin/Globulin Ratio 0.5 (L) 1.2 - 3.5     CBC with Auto Differential    Collection Time: 07/01/22  3:26 AM   Result Value Ref Range    WBC 18.9 (H) 4.3 - 11.1 K/uL    RBC 2.39 (L) 4.05 - 5.2 M/uL    Hemoglobin 7.6 (L) 11.7 - 15.4 g/dL    Hematocrit 23.7 (L) 35.8 - 46.3 %    MCV 99.2 (H) 79.6 - 97.8 FL    MCH 31.8 26.1 - 32.9 PG    MCHC 32.1 31.4 - 35.0 g/dL    RDW 27.2 (H) 11.9 - 14.6 %    Platelets 282 681 - 088 K/uL    MPV 12.0 9.4 - 12.3 FL    nRBC 0.00 0.0 - 0.2 K/uL    Differential Type AUTOMATED      Seg Neutrophils 66 43 - 78 %    Lymphocytes 26 13 - 44 %    Monocytes 7 4.0 - 12.0 %    Eosinophils % 0 (L) 0.5 - 7.8 %    Basophils 0 0.0 - 2.0 %    Immature Granulocytes 1 0.0 - 5.0 %    Segs Absolute 12.4 (H) 1.7 - 8.2 K/UL    Absolute Lymph # 4.9 (H) 0.5 - 4.6 K/UL    Absolute Mono # 1.4 (H) 0.1 - 1.3 K/UL    Absolute Eos # 0.1 0.0 - 0.8 K/UL    Basophils Absolute 0.0 0.0 - 0.2 K/UL    Absolute Immature Granulocyte 0.2 0.0 - 0.5 K/UL   Magnesium    Collection Time: 07/01/22  3:26 AM   Result Value Ref Range    Magnesium 2.0 1.8 - 2.4 mg/dL   Vancomycin Level, Random    Collection Time: 07/01/22  8:45 AM   Result Value Ref Range    Vancomycin Rm 19.3 UG/ML   Respiratory Panel, Molecular, with COVID-19 (Restricted: peds pts or suitable admitted adults)    Collection Time: 07/01/22 10:42 AM    Specimen: Nasopharyngeal   Result Value Ref Range    Adenovirus by PCR NOT DETECTED NOTDET      Coronavirus 229E by PCR NOT DETECTED NOTDET      Coronavirus HKU1 by PCR NOT DETECTED NOTDET      Coronavirus NL63 by PCR NOT DETECTED NOTDET      Coronavirus OC43 by PCR NOT DETECTED NOTDET      SARS-CoV-2, PCR NOT DETECTED NOTDET      Human Metapneumovirus by PCR NOT DETECTED NOTDET      Rhinovirus Enterovirus PCR NOT DETECTED NOTDET      Influenza A by PCR NOT DETECTED NOTDET      INFLUENZA B PCR NOT DETECTED NOTDET      Parainfluenza 1 PCR NOT DETECTED NOTDET      Parainfluenza 2 PCR NOT DETECTED NOTDET      Parainfluenza 3 PCR NOT DETECTED NOTDET      Parainfluenza 4 PCR NOT DETECTED NOTDET      Respiratory Syncytial Virus by PCR NOT DETECTED NOTDET      Bordetella parapertussis by PCR NOT DETECTED NOTDET      Bordetella pertussis by PCR NOT DETECTED NOTDET      Chlamydophila Pneumonia PCR NOT DETECTED NOTDET      Mycoplasma pneumo by PCR NOT DETECTED NOTDET     Transthoracic echocardiogram (TTE) complete with contrast, bubble, strain, and 3D PRN    Collection Time: 07/01/22 11:26 AM   Result Value Ref Range    LA Minor Axis 5.1 cm    LA Major [place] 5.3 cm    LA Area 2C 15.8 cm2    LA Area 4C 17.6 cm2    LA Volume BP 45 22 - 52 mL    LA Diameter 3.7 cm    AV Mean Velocity 1.3 m/s    AV Mean Gradient 7 mmHg    AV VTI 28.3 cm    AV Peak Velocity 1.7 m/s    AV Peak Gradient 12 mmHg    AV Area by VTI 2.9 cm2    AV Area by Peak Velocity 2.6 cm2    Aortic Root 2.9 cm    Ascending Aorta 2.9 cm    EF 3D 62 %    LVOT SV 82.0 ml    LV EDV 3D 154 mL    LV ESV 3D 58 mL    LV Mass 3D 146.0 g    IVSd 1.0 (A) 0.6 - 0.9 cm    LVIDd 4.7 3.9 - 5.3 cm    LVIDs 3.1 cm    LVOT Diameter 2.0 cm    LVOT Mean Gradient 4 mmHg    LVOT VTI 26.1 cm    LVOT Peak Velocity 1.4 m/s    LVOT Peak Gradient 8 mmHg    LVPWd 1.0 (A) 0.6 - 0.9 cm    LV E' Lateral Velocity 15 cm/s    LV E' Septal Velocity 14 cm/s    LVOT Area 3.1 cm2    IVC Proxmal 2.9 cm    MV E Wave Deceleration Time 122.0 ms    MV A Velocity 1.84 m/s    MV E Velocity 1.31 m/s    RV Basal Dimension 3.1 cm    TAPSE 2.6 1.7 cm    TR Max Velocity 2.95 m/s    TR Peak Gradient 35 mmHg    Body Surface Area 2.01 m2    Fractional Shortening 2D 34 28 - 44 %    LV ESV Index 3D 30 mL/m2    LV EDV Index 3D 79 mL/m2    LVIDd Index 2.40 cm/m2    LVIDs Index 1.58 cm/m2    LV RWT Ratio 0.43     LV Mass 2D 164.5 (A) 67 - 162 g    LV Mass 2D Index 83.9 43 - 95 g/m2    LV Mass Index 3D 74.5 g/m2    MV E/A 0.71     E/E' Ratio (Averaged) 9.05     E/E' Lateral 8.73     E/E' Septal 9.36     LA Volume Index BP 23 16 - 34 ml/m2    LVOT Stroke Volume Index 41.8 mL/m2    LA Size Index 1.89 cm/m2    LA/AO Root Ratio 1.28     Ao Root Index 1.48 cm/m2    Ascending Aorta Index 1.48 cm/m2    AV Velocity Ratio 0.82     LVOT:AV VTI Index 0.92     LASHONDA/BSA VTI 1.5 cm2/m2    LASHONDA/BSA Peak Velocity 1.3 cm2/m2    Est. RA Pressure 8 mmHg    RVSP 43 mmHg   DIRECT ANTIGLOBULIN TEST    Collection Time: 07/01/22 11:37 AM   Result Value Ref Range    Polyspecific [place] NEG    Prothrombin Time Mixing Study    Collection Time: 07/01/22 11:42 AM   Result Value Ref Range    PT mixing study          Protime 19.2 (H) 12.6 - 14.5 sec    INR 1.6      PT 1:1 Mix patient 17.1 sec    PT Incubated PAT 16.7 sec   Mixing Study aPTT    Collection Time: 07/01/22 11:42 AM   Result Value Ref Range    PTT Mixing Studty          PTT 52.0 (H) 23.4 - 34.5 sec    PTT 1:1 Mix 41.4 sec    PTT Incubated Pat 45.9 sec   Potassium    Collection Time: 07/01/22  1:41 PM   Result Value Ref Range    Potassium 3.2 (L) 3.5 - 5.1 mmol/L         Other Studies:  CT HEAD WO CONTRAST    Result Date: 6/30/2022  HEAD CT WITHOUT CONTRAST  6/30/2022 HISTORY:   AMS  patient was found down at home today after receiving a blood transfusion. Lethargy. TECHNIQUE: Noncontrast axial images were obtained through the brain. All CT scans at this facility used dose modulation, interactive reconstruction and/or weight based dosing when appropriate to reduce radiation dose to as low as reasonably achievable.  COMPARISON: June 2, 2022 FINDINGS: There is no acute intracranial hemorrhage, significant mass effect or CT evidence of acute large artery territorial infarction. Please note that a hyperacute infarct or small vessel infarct may not be apparent on initial CT imaging. There is no hydrocephalus , intra-axial mass or abnormal extra-axial fluid collection. There are no displaced skull fractures. The mastoid air cells and paranasal sinuses are clear where imaged. No acute findings. CT CERVICAL SPINE WO CONTRAST    Result Date: 6/30/2022  CT CERVICAL SPINE WITHOUT CONTRAST HISTORY:  neck pain; patient was found down today after a blood transfusion. Lethargy. TECHNIQUE: Noncontrast axial images were obtained from the craniocervical junction through T3-T4. Multiplanar reformatted images were generated. All CT scans at this facility used dose modulation, iterative reconstruction and/or weight based dosing when appropriate to reduce radiation dose to as low as reasonably achievable. COMPARISON:  None FINDINGS:  The cervical vertebrae are normal in height and alignment. There is no prevertebral soft tissue swelling. The articular facets overlap appropriately. Axial images demonstrate no displaced cervical spine fracture. Included portions of the lungs demonstrate diffuse upper lobe consolidation. 1. No acute findings in the cervical spine. 2. Diffuse bilateral upper lobe consolidation. XR CHEST PORTABLE    Result Date: 6/30/2022  PORTABLE CHEST 1 VIEW HISTORY: Central line placement COMPARISON: One hour prior FINDINGS: A new right IJ catheter terminates in the right atrium. The lung volumes are diminished. There is no visible pneumothorax. Bilateral upper lobe predominant consolidation is present. EKG leads are present. 1. Placement of right IJ catheter without visible pneumothorax. 2. Bilateral consolidation.     XR CHEST PORTABLE    Result Date: 6/30/2022  PORTABLE CHEST 1 VIEW HISTORY: Altered level of consciousness COMPARISON: June 2, 2022 FINDINGS: Consolidation is present in the periphery of the right upper lung and to lesser extent left upper lobe. Lung volumes are diminished. EKG leads are present. Low lung volumes with bilateral consolidation. CT CHEST ABDOMEN PELVIS W CONTRAST    Result Date: 6/30/2022  CT ABDOMEN AND PELVIS WITH CONTRAST 6/30/2022 1:51 PM HISTORY:  Patient was found down today after receiving blood transfusion. Lethargy. Jaundice. Abdominal discomfort; TECHNIQUE: The patient received 100 mL Isovue-370 nonionic IV contrast. Axial images were obtained through the chest, abdomen and pelvis. Coronal reformatted images were generated. All CT scans at this facility used dose modulation, interactive reconstruction and/or weight based dosing when appropriate to reduce radiation dose to as low as reasonably achievable. COMPARISON: None available FINDINGS: CHEST: A right IJ catheter extends into the bottom of the right atrium. There is no thoracic adenopathy. Consolidation is present throughout both upper lobes and within the lingula. Pleural spaces are clear. There is no pneumothorax. ABDOMEN: Gastric bypass. Gallbladder: Cholecystectomy. Liver: Diffuse hepatic steatosis. No focal hepatic lesions and no intrahepatic biliary ductal dilatation. Pancreas: Normal. Spleen: Normal. Adrenal glands: Normal. Kidneys: The kidneys enhance symmetrically with contrast and there is no hydronephrosis. Bowel: The appendix is not well demonstrated. There is diffuse colon wall thickening. Retroperitoneum:No adenopathy. Abdominal aorta is normal in caliber. PELVIS:The bladder is normal in appearance. There is no free pelvic fluid. The uterus is present. Bones: There are no aggressive osseous lesions. 1. Bilateral upper lobe consolidation. 2. Hepatic steatosis. 3. Pan colitis.         Current Meds:  Current Facility-Administered Medications   Medication Dose Route Frequency    potassium chloride (KLOR-CON M) extended release tablet 40 mEq  40 mEq Oral PRN    Or    potassium bicarb-citric acid (EFFER-K) effervescent tablet 40 mEq  40 mEq Oral PRN    Or    potassium chloride 10 mEq/100 mL IVPB (Peripheral Line)  10 mEq IntraVENous PRN    magnesium sulfate 2000 mg in 50 mL IVPB premix  2,000 mg IntraVENous PRN    vancomycin (VANCOCIN) 1,000 mg in sodium chloride 0.9 % 250 mL IVPB (Oppt8Dme)  1,000 mg IntraVENous Q12H    dextrose 5 % with KCl 20 mEq infusion   IntraVENous Continuous    lactulose (CHRONULAC) 10 GM/15ML solution 20 g  20 g Oral 4x Daily    sodium chloride flush 0.9 % injection 5-40 mL  5-40 mL IntraVENous 2 times per day    sodium chloride flush 0.9 % injection 5-40 mL  5-40 mL IntraVENous PRN    0.9 % sodium chloride infusion   IntraVENous PRN    ondansetron (ZOFRAN-ODT) disintegrating tablet 4 mg  4 mg Oral Q8H PRN    Or    ondansetron (ZOFRAN) injection 4 mg  4 mg IntraVENous Q6H PRN    polyethylene glycol (GLYCOLAX) packet 17 g  17 g Oral Daily PRN    nicotine (NICODERM CQ) 14 MG/24HR 1 patch  1 patch TransDERmal Daily    levalbuterol (XOPENEX) nebulizer solution 1.25 mg  1.25 mg Nebulization Q8H PRN    piperacillin-tazobactam (ZOSYN) 3,375 mg in sodium chloride 0.9 % 50 mL IVPB (mini-bag)  3,375 mg IntraVENous Q8H    tuberculin injection 5 Units  5 Units IntraDERmal Once    morphine injection 2 mg  2 mg IntraVENous Q3H PRN       Signed:  Kai Haywood MD    Part of this note may have been written by using a voice dictation software. The note has been proof read but may still contain some grammatical/other typographical errors.

## 2022-07-01 NOTE — PROGRESS NOTES
[facility] Hematology & Oncology        Inpatient Hematology / Oncology Progress Note      Admission Date: 6/30/2022  9:47 AM  Reason for Admission/Hospital Course: Severe sepsis ([facility] [address] [A41.9, R65.20]      24 Hour Events:  Obtunded in ICU  Opens eyes to stimuli  Denies pain      ROS: Unable to obtain    10 point review of systems is otherwise negative with the exception of the elements mentioned above in the HPI.      Allergies   Allergen Reactions    Lamictal [Lamotrigine] Rash       OBJECTIVE:  Patient Vitals for the past 8 hrs:   BP Temp Temp src Pulse Resp SpO2   07/01/22 0832 (!) 118/56 -- -- (!) 119 29 96 %   07/01/22 0821 -- 98.4 °F (36.9 °C) Axillary -- -- --   07/01/22 0803 122/66 -- -- (!) 117 (!) 38 95 %   07/01/22 0748 125/62 -- -- (!) 119 (!) 42 95 %   07/01/22 0732 (!) 114/57 -- -- (!) 118 (!) 39 94 %   07/01/22 0717 (!) 114/56 -- -- (!) 118 (!) 46 94 %   07/01/22 0703 (!) 121/59 -- -- (!) 119 -- 95 %   07/01/22 0633 129/63 -- -- (!) 117 28 94 %   07/01/22 0631 -- -- -- -- 30 --   07/01/22 0617 (!) 115/57 -- -- (!) 118 -- 94 %   07/01/22 0603 (!) 117/59 -- -- (!) 118 (!) 42 94 %   07/01/22 0548 (!) 113/59 -- -- (!) 120 -- 94 %   07/01/22 0546 -- -- -- -- 30 --   07/01/22 0533 (!) 112/56 -- -- (!) 120 (!) 51 94 %   07/01/22 0517 (!) 118/55 -- -- (!) 114 (!) 49 94 %   07/01/22 0503 (!) 109/56 -- -- (!) 114 (!) 35 94 %   07/01/22 0448 (!) 110/58 -- -- (!) 114 (!) 32 93 %   07/01/22 0432 (!) 115/59 -- -- (!) 116 (!) 36 94 %   07/01/22 0417 (!) 115/55 -- -- (!) 113 29 94 %   07/01/22 0403 (!) 114/58 -- -- (!) 113 (!) 35 94 %   07/01/22 0348 (!) 114/59 -- -- (!) 111 (!) 38 95 %   07/01/22 0333 118/60 -- -- (!) 113 (!) 32 96 %   07/01/22 0317 111/63 -- -- (!) 114 -- 97 %   07/01/22 0316 -- -- -- -- 27 --   07/01/22 0309 119/64 98.4 °F (36.9 °C) Oral (!) 117 (!) 33 --   07/01/22 0303 119/64 -- -- (!) 117 21 --   07/01/22 0248 115/60 -- -- (!) 120 -- --   07/01/22 0233 114/70 -- -- (!) 121 12 --   07/01/22 0217 110/62 -- -- (!) 121 26 95 %   22 0204 -- -- -- -- 30 --   22 0203 (!) 113/57 -- -- (!) 117 -- 94 %   22 0148 (!) 112/57 -- -- (!) 115 -- 94 %   22 0133 (!) 108/55 -- -- (!) 116 -- 93 %   22 0117 115/61 -- -- (!) 117 -- 94 %   22 0104 (!) 125/58 -- -- (!) 116 -- 95 %     Temp (24hrs), Av.2 °F (36.8 °C), Min:97.3 °F (36.3 °C), Max:98.7 °F (37.1 °C)    No intake/output data recorded. Physical Exam:  Constitutional: Ill appearing female in no acute distress lying in ICU bed   HEENT: Normocephalic and atraumatic. Oropharynx is clear, mucous membranes are dry. Extraocular muscles are intact. Sclerae slightly yellow   Lymph node   No palpable submandibular, cervical, supraclavicular, axillary or inguinal lymph nodes. Skin Bullous lesions, healing, slightly improved. Jaundiced. Respiratory Lungs are clear to auscultation bilaterally without wheezes, rales or rhonchi, normal air exchange without accessory muscle use. RR 38   CVS Tachy rate, regular rhythm and normal S1 and S2. No murmurs, gallops, or rubs. Abdomen Soft, nontender and nondistended, normoactive bowel sounds. Neuro Follows some commands   MSK Normal range of motion in general.  No edema and no tenderness.    Psych AMS       Labs:      Recent Labs     22  0920 22  1015 22  0326   WBC 15.4* 17.2* 18.9*   RBC 2.28* 2.95* 2.39*   HGB 7.1* 9.2* 7.6*   HCT 23.4* 29.1* 23.7*   .6* 98.6* 99.2*   MCH 31.1 31.2 31.8   MCHC 30.3* 31.6 32.1   RDW 28.7* 26.5* 27.2*    205 204   MPV 12.0 11.7 12.0        Recent Labs     22  0920 22  1015 22  0326    145 148*   K 3.3* 3.9 2.9*   * 109* 114*   CO2 27 27 26   BUN 7 8 6   GFRAA >60 >60 >60   GLOB 3.3 3.5 3.0   MG  --  2.2 2.0   PHOS 2.6  --   --          Imaging:    Medications:  Current Facility-Administered Medications   Medication Dose Route Frequency    potassium chloride (KLOR-CON M) extended release tablet 40 mEq  40 mEq Oral PRN    Or    potassium bicarb-citric acid (EFFER-K) effervescent tablet 40 mEq  40 mEq Oral PRN    Or    potassium chloride 10 mEq/100 mL IVPB (Peripheral Line)  10 mEq IntraVENous PRN    magnesium sulfate 2000 mg in 50 mL IVPB premix  2,000 mg IntraVENous PRN    lactulose enema   Rectal Q6H    sodium chloride flush 0.9 % injection 5-40 mL  5-40 mL IntraVENous 2 times per day    sodium chloride flush 0.9 % injection 5-40 mL  5-40 mL IntraVENous PRN    0.9 % sodium chloride infusion   IntraVENous PRN    ondansetron (ZOFRAN-ODT) disintegrating tablet 4 mg  4 mg Oral Q8H PRN    Or    ondansetron (ZOFRAN) injection 4 mg  4 mg IntraVENous Q6H PRN    polyethylene glycol (GLYCOLAX) packet 17 g  17 g Oral Daily PRN    nicotine (NICODERM CQ) 14 MG/24HR 1 patch  1 patch TransDERmal Daily    levalbuterol (XOPENEX) nebulizer solution 1.25 mg  1.25 mg Nebulization Q8H PRN    piperacillin-tazobactam (ZOSYN) 3,375 mg in sodium chloride 0.9 % 50 mL IVPB (mini-bag)  3,375 mg IntraVENous Q8H    dextrose 5 % and 0.45 % sodium chloride infusion   IntraVENous Continuous    tuberculin injection 5 Units  5 Units IntraDERmal Once    vancomycin (VANCOCIN) 1250 mg in sodium chloride 0.9% 250 mL IVPB  1,250 mg IntraVENous Q12H    morphine injection 2 mg  2 mg IntraVENous Q3H PRN       PLAN:  Non immune hemalytic anemia with colitis, cholestatic liver disease and bullous impetigo/purpuric rash suggestive of inflammatory or infectious condition  -ANCA panel negative     Recommend:  -repeat Larissa and send super larissa to [facility] lab  -complete gely work up: mri brain, 24 hour urine copper, ceruloplasmin  -BMA/Bx with IR (was scheduled for tomorrow)  -complete coags: PT and PT mixing study; PTT and PTT mixing study; lupus anticoagulatnt panel + but suspect LA and not factor deficiency  -factor VII (seven, 7) activity level  -consult GI, evaluate colitis and liver disease, consider liver biopsy, colonoscopy  -vitamin C levels  -IGLESIA, UDS  -PNH by flow cytometry   -if return of encephalopathy, neuro consult  -reassurance, hold steroids until initial onc work up returned or performed    Prior labs:  Macarena negative  periopheral smear multiple target cells c/w liver injury likely; no shistocytes, mild clumping no blasts  Peripheral flow negative  Elevated PT and TCT  Low CH50  Elevated IGG  Low copper   Ceruloplasmin pending    7/1CT-colitis and fatty liver-GI following- lactulose enemas ordered-checking for C-diff-on vanc/zosyn-RUQ US ordered-may need liver biopsy-24 hour urine copper pending. UA+. UC pending. BC pending. WBC 18.9 (17.2). HGB 7.6 (9.2). Bili 11.0 (13.9). UDS +opiates. Send out labs ordered. Hold on BMbx for now. Pain managed by primary. RD consulted. ID consulted. Skin biopsy results pending.                  Blake , APRN - NP   [facility] Hematology & Oncology  [id number] [place] [address]  Office : [phone]  Fax : [phone]

## 2022-07-01 NOTE — PROGRESS NOTES
Initial visit made to patient and a prayer was provided. The patient appeared to be resting.         Jose Gomez, [address] [facility] [place], [facility]

## 2022-07-01 NOTE — CARE COORDINATION
Chart reviewed s/p admission to ICU severe sepsis. Now with tx orders to floor. Pt has PCP and insurance. Independent. No needs identified at present for d/c. CM to follow for any d/c needs per MD.        07/01/22 1221   Service Assessment   Patient Orientation Alert and Oriented   Cognition Alert   History Provided By Medical Record   Primary [id number]  Parent   Patient's Healthcare Decision Maker is: Legal Next of Kin   PCP Verified by CM Yes   Prior Functional Level Independent in ADLs/IADLs   Can patient return to prior living arrangement Yes   Ability to make needs known: Good   [facility] Resources [facility] (Comment)  [facility]   Social/Functional History   Lives With Parent   ADL Assistance Independent   Ambulation Assistance Independent   Services At/After Discharge   Confirm Follow Up Transport Family   Condition of Participation: Discharge Planning   Freedom of Choice list was provided with basic dialogue that supports the patient's individualized plan of care/goals, treatment preferences, and shares the quality data associated with the providers?   Yes

## 2022-07-01 NOTE — H&P
[facility] Cardiology Initial Cardiac Evaluation                Date of  Admission: 6/30/2022  9:47 AM     PCP: Carline Gates DO  Requested by: Dr Cem Carrillo  Primary Cardiologist: None  APC Attending: Dr Oscar Davies    Reason for Evaluation: Abnormal Echo consider NE      Krista Wright is a 36 y.o. female with hx of HTN, anxiety, gastric bypass, LL Edema from lack of protein, interstitial cystitis, macrocytic anemia. The patient was admitted for metabolic encephalopathy. She was found by the family confused with jaundice skin with a bili showing 13.8. The CT of the head at that time was unremarkable and she was admitted to ICU for sever sepsis with leukocytosis. Thus far the patients BC have been negative, CT of the C/A/P showed upper lobe consolidations, pan colitis, and hepatic steatosis. The patient respiratory panel was negative as well as hepatitis panel with no clear source for infection seen but thought to be pulmonary vrs urine in nature. The patient UDS was also found to be negative as well. An echo performed show normal EF but possible vegetation off the aortic valve. GI is seeing the patient with plans for a RUQ US, lactulose enemas, liver biopsy, along with other labs as well. Today the patient was noted to have K of 3.2 improving, Albumin 1.5, ALT 51, total protien 4.5 billirubin 11.0, WBC 18.9, and Hgb 7.6 near baseline. Per family this started after having some LLE, having syncope with low Hgb, then she was transfused about 1 month ago. After the transfusion the patient and mother noticed a system rash develop, bleeding between her skin folds on her knuckles, and poor hilling wounds from minor injures including take or ekg to her skin. This admission she has been receiving lactulose and has had some loose stool with recent outpatient ABX treatment. She was placed on C Diff precautions today as well.   Since admission she has had trouble swallowing as well and  Speech has been consulted at this time. She has no complaints of palpitations, SOB, CP, or known cardiac problems. Recent Cardiac Synopsis    Echo: 2022   Left Ventricle: Normal left ventricular systolic function with a visually estimated EF of 60 - 65%. Left ventricle size is normal. Normal wall thickness. Normal wall motion. Normal diastolic function.   Aortic Valve: Valve structure is normal.  Possible vegetation seen in parasternal long axis view. Consider NE if concern of endocarditis.   Tricuspid Valve: Mild regurgitation. The estimated RVSP is 43 mmHg.   Technical qualifiers: Color flow Doppler was performed and pulse wave and/or continuous wave Doppler was performed.     EKG: S Tach      Past Medical History:   Diagnosis Date    Anxiety     Bilateral leg edema     if she doesn't eat enough protein    History of blood transfusion 2022    Hypertension 2021    Interstitial cystitis       Past Surgical History:   Procedure Laterality Date    CHOLECYSTECTOMY  2012    GASTRIC BYPASS SURGERY      was 283 lbs    TONSILLECTOMY      WRIST SURGERY       Allergies   Allergen Reactions    Lamictal [Lamotrigine] Rash      Family History   Problem Relation Age of Onset    Other Father         doesn't have contact with    Ovarian Cancer Mother 39   Morse Hedger Other Mother         fibromyalgia    Rheum Arthritis Mother       Social History     Tobacco History     Smoking Status  Current Every Day Smoker Smoking Frequency  0.5 packs/day    Smokeless Tobacco Use  Never Used    Tobacco Comment  Quit smoking: started age 14          Alcohol History     Alcohol Use Status  Not Currently          Drug Use     Drug Use Status  Not Currently          Sexual Activity     Sexually Active  Not Asked                 Medications Prior to Admission: Fe Fum-FePoly-FA-Vit C-Vit B3 (INTEGRA F PO), Take by mouth  Potassium Bicarbonate (EFFER-K PO), Take by mouth  [] cephALEXin (KEFLEX) 500 MG capsule, Take 1 capsule by mouth 3 times daily for 7 days  Multiple Vitamins-Minerals (THERAPEUTIC MULTIVITAMIN-MINERALS) tablet, Take 1 tablet by mouth daily (Patient not taking: Reported on 6/28/2022)  furosemide (LASIX) 20 MG tablet, Take 1 tablet by mouth daily  DULoxetine (CYMBALTA) 30 MG extended release capsule, Take 1 capsule by mouth daily  metoprolol tartrate (LOPRESSOR) 25 MG tablet, Take 1 tablet by mouth 2 times daily     Current Facility-Administered Medications   Medication Dose Route Frequency    potassium chloride (KLOR-CON M) extended release tablet 40 mEq  40 mEq Oral PRN    Or    potassium bicarb-citric acid (EFFER-K) effervescent tablet 40 mEq  40 mEq Oral PRN    Or    potassium chloride 10 mEq/100 mL IVPB (Peripheral Line)  10 mEq IntraVENous PRN    magnesium sulfate 2000 mg in 50 mL IVPB premix  2,000 mg IntraVENous PRN    vancomycin (VANCOCIN) 1,000 mg in sodium chloride 0.9 % 250 mL IVPB ([id number])  1,000 mg IntraVENous Q12H    dextrose 5 % with KCl 20 mEq infusion   IntraVENous Continuous    lactulose (CHRONULAC) 10 GM/15ML solution 20 g  20 g Oral 4x Daily    LORazepam (ATIVAN) tablet 0.5 mg  0.5 mg Oral Q6H PRN    sodium chloride flush 0.9 % injection 5-40 mL  5-40 mL IntraVENous 2 times per day    sodium chloride flush 0.9 % injection 5-40 mL  5-40 mL IntraVENous PRN    0.9 % sodium chloride infusion   IntraVENous PRN    ondansetron (ZOFRAN-ODT) disintegrating tablet 4 mg  4 mg Oral Q8H PRN    Or    ondansetron (ZOFRAN) injection 4 mg  4 mg IntraVENous Q6H PRN    polyethylene glycol (GLYCOLAX) packet 17 g  17 g Oral Daily PRN    nicotine (NICODERM CQ) 14 MG/24HR 1 patch  1 patch TransDERmal Daily    levalbuterol (XOPENEX) nebulizer solution 1.25 mg  1.25 mg Nebulization Q8H PRN    piperacillin-tazobactam (ZOSYN) 3,375 mg in sodium chloride 0.9 % 50 mL IVPB (mini-bag)  3,375 mg IntraVENous Q8H    tuberculin injection 5 Units  5 Units IntraDERmal Once    morphine injection 2 mg  2 mg Ethanol Lvl <3 MG/DL   Culture, Blood 1    Collection Time: 06/30/22  5:10 PM    Specimen: Blood   Result Value Ref Range    Special Requests RIGHT  HAND        Culture NO GROWTH AFTER 13 HOURS     Culture, Urine    Collection Time: 06/30/22  5:28 PM    Specimen: URINE-CLEAN CATCH    URINE   Result Value Ref Range    Special Requests NO SPECIAL REQUESTS      Culture        No growth after short period of incubation. Further results to follow after overnight incubation.    Drug Screen Psych, Urine    Collection Time: 06/30/22  5:28 PM   Result Value Ref Range    Benzodiazepines, Urine Negative      Opiates, Urine Positive      Amphetamine, Urine Negative      Cocaine, Urine Negative      THC, TH-Cannabinol, Urine Positive     Comprehensive Metabolic Panel w/ Reflex to MG    Collection Time: 07/01/22  3:26 AM   Result Value Ref Range    Sodium 148 (H) 136 - 145 mmol/L    Potassium 2.9 (L) 3.5 - 5.1 mmol/L    Chloride 114 (H) 98 - 107 mmol/L    CO2 26 21 - 32 mmol/L    Anion Gap 8 7 - 16 mmol/L    Glucose 113 (H) 65 - 100 mg/dL    BUN 6 6 - 23 MG/DL    CREATININE 0.70 0.6 - 1.0 MG/DL    GFR African American >60 >60 ml/min/1.73m2    GFR Non- >60 >60 ml/min/1.73m2    Calcium 7.2 (L) 8.3 - 10.4 MG/DL    Total Bilirubin 11.0 (H) 0.2 - 1.1 MG/DL    ALT 37 12 - 65 U/L    AST 51 (H) 15 - 37 U/L    Alk Phosphatase 113 50 - 136 U/L    Total Protein 4.5 (L) 6.3 - 8.2 g/dL    Albumin 1.5 (L) 3.5 - 5.0 g/dL    Globulin 3.0 2.3 - 3.5 g/dL    Albumin/Globulin Ratio 0.5 (L) 1.2 - 3.5     CBC with Auto Differential    Collection Time: 07/01/22  3:26 AM   Result Value Ref Range    WBC 18.9 (H) 4.3 - 11.1 K/uL    RBC 2.39 (L) 4.05 - 5.2 M/uL    Hemoglobin 7.6 (L) 11.7 - 15.4 g/dL    Hematocrit 23.7 (L) 35.8 - 46.3 %    MCV 99.2 (H) 79.6 - 97.8 FL    MCH 31.8 26.1 - 32.9 PG    MCHC 32.1 31.4 - 35.0 g/dL    RDW 27.2 (H) 11.9 - 14.6 %    Platelets 435 455 - 419 K/uL    MPV 12.0 9.4 - 12.3 FL    nRBC 0.00 0.0 - 0.2 K/uL Differential Type AUTOMATED      Seg Neutrophils 66 43 - 78 %    Lymphocytes 26 13 - 44 %    Monocytes 7 4.0 - 12.0 %    Eosinophils % 0 (L) 0.5 - 7.8 %    Basophils 0 0.0 - 2.0 %    Immature Granulocytes 1 0.0 - 5.0 %    Segs Absolute 12.4 (H) 1.7 - 8.2 K/UL    Absolute Lymph # 4.9 (H) 0.5 - 4.6 K/UL    Absolute Mono # 1.4 (H) 0.1 - 1.3 K/UL    Absolute Eos # 0.1 0.0 - 0.8 K/UL    Basophils Absolute 0.0 0.0 - 0.2 K/UL    Absolute Immature Granulocyte 0.2 0.0 - 0.5 K/UL   Magnesium    Collection Time: 07/01/22  3:26 AM   Result Value Ref Range    Magnesium 2.0 1.8 - 2.4 mg/dL   Vancomycin Level, Random    Collection Time: 07/01/22  8:45 AM   Result Value Ref Range    Vancomycin Rm 19.3 UG/ML   Respiratory Panel, Molecular, with COVID-19 (Restricted: peds pts or suitable admitted adults)    Collection Time: 07/01/22 10:42 AM    Specimen: Nasopharyngeal   Result Value Ref Range    Adenovirus by PCR NOT DETECTED NOTDET      Coronavirus 229E by PCR NOT DETECTED NOTDET      Coronavirus HKU1 by PCR NOT DETECTED NOTDET      Coronavirus NL63 by PCR NOT DETECTED NOTDET      Coronavirus OC43 by PCR NOT DETECTED NOTDET      SARS-CoV-2, PCR NOT DETECTED NOTDET      Human Metapneumovirus by PCR NOT DETECTED NOTDET      Rhinovirus Enterovirus PCR NOT DETECTED NOTDET      Influenza A by PCR NOT DETECTED NOTDET      INFLUENZA B PCR NOT DETECTED NOTDET      Parainfluenza 1 PCR NOT DETECTED NOTDET      Parainfluenza 2 PCR NOT DETECTED NOTDET      Parainfluenza 3 PCR NOT DETECTED NOTDET      Parainfluenza 4 PCR NOT DETECTED NOTDET      Respiratory Syncytial Virus by PCR NOT DETECTED NOTDET      Bordetella parapertussis by PCR NOT DETECTED NOTDET      Bordetella pertussis by PCR NOT DETECTED NOTDET      Chlamydophila Pneumonia PCR NOT DETECTED NOTDET      Mycoplasma pneumo by PCR NOT DETECTED NOTDET     Transthoracic echocardiogram (TTE) complete with contrast, bubble, strain, and 3D PRN    Collection Time: 07/01/22 11:26 AM   Result Value Ref Range    LA Minor Axis 5.1 cm    LA Major Entriken 5.3 cm    LA Area 2C 15.8 cm2    LA Area 4C 17.6 cm2    LA Volume BP 45 22 - 52 mL    LA Diameter 3.7 cm    AV Mean Velocity 1.3 m/s    AV Mean Gradient 7 mmHg    AV VTI 28.3 cm    AV Peak Velocity 1.7 m/s    AV Peak Gradient 12 mmHg    AV Area by VTI 2.9 cm2    AV Area by Peak Velocity 2.6 cm2    Aortic Root 2.9 cm    Ascending Aorta 2.9 cm    EF 3D 62 %    LVOT SV 82.0 ml    LV EDV 3D 154 mL    LV ESV 3D 58 mL    LV Mass 3D 146.0 g    IVSd 1.0 (A) 0.6 - 0.9 cm    LVIDd 4.7 3.9 - 5.3 cm    LVIDs 3.1 cm    LVOT Diameter 2.0 cm    LVOT Mean Gradient 4 mmHg    LVOT VTI 26.1 cm    LVOT Peak Velocity 1.4 m/s    LVOT Peak Gradient 8 mmHg    LVPWd 1.0 (A) 0.6 - 0.9 cm    LV E' Lateral Velocity 15 cm/s    LV E' Septal Velocity 14 cm/s    LVOT Area 3.1 cm2    IVC Proxmal 2.9 cm    MV E Wave Deceleration Time 122.0 ms    MV A Velocity 1.84 m/s    MV E Velocity 1.31 m/s    RV Basal Dimension 3.1 cm    TAPSE 2.6 1.7 cm    TR Max Velocity 2.95 m/s    TR Peak Gradient 35 mmHg    Body Surface Area 2.01 m2    Fractional Shortening 2D 34 28 - 44 %    LV ESV Index 3D 30 mL/m2    LV EDV Index 3D 79 mL/m2    LVIDd Index 2.40 cm/m2    LVIDs Index 1.58 cm/m2    LV RWT Ratio 0.43     LV Mass 2D 164.5 (A) 67 - 162 g    LV Mass 2D Index 83.9 43 - 95 g/m2    LV Mass Index 3D 74.5 g/m2    MV E/A 0.71     E/E' Ratio (Averaged) 9.05     E/E' Lateral 8.73     E/E' Septal 9.36     LA Volume Index BP 23 16 - 34 ml/m2    LVOT Stroke Volume Index 41.8 mL/m2    LA Size Index 1.89 cm/m2    LA/AO Root Ratio 1.28     Ao Root Index 1.48 cm/m2    Ascending Aorta Index 1.48 cm/m2    AV Velocity Ratio 0.82     LVOT:AV VTI Index 0.92     LASHONDA/BSA VTI 1.5 cm2/m2    LASHONDA/BSA Peak Velocity 1.3 cm2/m2    Est. RA Pressure 8 mmHg    RVSP 43 mmHg   DIRECT ANTIGLOBULIN TEST    Collection Time: 07/01/22 11:37 AM   Result Value Ref Range    Polyspecific [facility] NEG    Prothrombin Time Mixing Study    Collection control, adjustment of the mA and/or kV according to patient size, iterative reconstruction. FINDINGS: Normal appearance of the brain parenchyma without evidence of acute infarction, hemorrhage, or mass lesion. No hydrocephalus or midline shift. No extra-axial mass or hemorrhage. The basal cisterns are patent. The visualized portions of the orbits are normal. The mastoid air cells and paranasal sinuses are patent. The visualized vascular structures have an unremarkable noncontrast appearance. The calvarium and soft tissues appear normal.     No acute intracranial abnormality. CT CERVICAL SPINE WO CONTRAST    Result Date: 6/30/2022  CT CERVICAL SPINE WITHOUT CONTRAST HISTORY:  neck pain; patient was found down today after a blood transfusion. Lethargy. TECHNIQUE: Noncontrast axial images were obtained from the craniocervical junction through T3-T4. Multiplanar reformatted images were generated. All CT scans at this facility used dose modulation, iterative reconstruction and/or weight based dosing when appropriate to reduce radiation dose to as low as reasonably achievable. COMPARISON:  None FINDINGS:  The cervical vertebrae are normal in height and alignment. There is no prevertebral soft tissue swelling. The articular facets overlap appropriately. Axial images demonstrate no displaced cervical spine fracture. Included portions of the lungs demonstrate diffuse upper lobe consolidation. 1. No acute findings in the cervical spine. 2. Diffuse bilateral upper lobe consolidation. US ABDOMEN COMPLETE    Result Date: 6/2/2022  COMPLETE ABDOMINAL SONOGRAPHY, June 2, 2022: CLINICAL HISTORY: Abnormal liver function tests and anemia. FINDINGS: Multiple images from real time ultrasound evaluation of the abdomen show that the gallbladder is surgically absent. The common bile duct is normal in caliber at 5 mm, and no significant intrahepatic bilary ductal dilatation is evident.   Diffuse hepatic parenchymal hyperechogenicity with sonic attenuation is most commonly associated with fatty infiltration. The liver is mildly enlarged at 21 cm. Diffuse hepatic parenchymal hyperechogenicity with sonic attenuation is most commonly associated with fatty infiltration. No focal liver lesion is seen. The spleen is mildly enlarged at 14.2 cm. The pancreas, kidneys, aorta, and IVC appear unremarkable as imaged. 1.  Mild hepatosplenomegaly and probable hepatic steatosis no definite focal liver mass identified. 2.  No biliary ductal dilatation status post cholecystectomy. XR CHEST PORTABLE    Result Date: 6/30/2022  PORTABLE CHEST 1 VIEW HISTORY: Central line placement COMPARISON: One hour prior FINDINGS: A new right IJ catheter terminates in the right atrium. The lung volumes are diminished. There is no visible pneumothorax. Bilateral upper lobe predominant consolidation is present. EKG leads are present. 1. Placement of right IJ catheter without visible pneumothorax. 2. Bilateral consolidation. XR CHEST PORTABLE    Result Date: 6/30/2022  PORTABLE CHEST 1 VIEW HISTORY: Altered level of consciousness COMPARISON: June 2, 2022 FINDINGS: Consolidation is present in the periphery of the right upper lung and to lesser extent left upper lobe. Lung volumes are diminished. EKG leads are present. Low lung volumes with bilateral consolidation. XR CHEST PORTABLE    Result Date: 6/2/2022  Portable chest: History: Dyspnea Comparison: None Findings: A single view of the chest was obtained at 12:36 PM. The cardiac silhouette is normal in size and configuration. The lungs and pleural spaces are clear. The pulmonary vascularity is within normal limits. Unremarkable portable chest radiograph. CT CHEST ABDOMEN PELVIS W CONTRAST    Result Date: 6/30/2022  CT ABDOMEN AND PELVIS WITH CONTRAST 6/30/2022 1:51 PM HISTORY:  Patient was found down today after receiving blood transfusion. Lethargy. Jaundice.  Abdominal discomfort; TECHNIQUE: The patient received 100 mL Isovue-370 nonionic IV contrast. Axial images were obtained through the chest, abdomen and pelvis. Coronal reformatted images were generated. All CT scans at this facility used dose modulation, interactive reconstruction and/or weight based dosing when appropriate to reduce radiation dose to as low as reasonably achievable. COMPARISON: None available FINDINGS: CHEST: A right IJ catheter extends into the bottom of the right atrium. There is no thoracic adenopathy. Consolidation is present throughout both upper lobes and within the lingula. Pleural spaces are clear. There is no pneumothorax. ABDOMEN: Gastric bypass. Gallbladder: Cholecystectomy. Liver: Diffuse hepatic steatosis. No focal hepatic lesions and no intrahepatic biliary ductal dilatation. Pancreas: Normal. Spleen: Normal. Adrenal glands: Normal. Kidneys: The kidneys enhance symmetrically with contrast and there is no hydronephrosis. Bowel: The appendix is not well demonstrated. There is diffuse colon wall thickening. Retroperitoneum:No adenopathy. Abdominal aorta is normal in caliber. PELVIS:The bladder is normal in appearance. There is no free pelvic fluid. The uterus is present. Bones: There are no aggressive osseous lesions. 1. Bilateral upper lobe consolidation. 2. Hepatic steatosis. 3. Pan colitis. Transthoracic echocardiogram (TTE) complete with contrast, bubble, strain, and 3D PRN    Result Date: 7/1/2022    Left Ventricle: Normal left ventricular systolic function with a visually estimated EF of 60 - 65%. Left ventricle size is normal. Normal wall thickness. Normal wall motion. Normal diastolic function.   Aortic Valve: Valve structure is normal.  Possible vegetation seen in parasternal long axis view. Consider NE if concern of endocarditis.   Tricuspid Valve: Mild regurgitation. The estimated RVSP is 43 mmHg.    Technical qualifiers: Color flow Doppler was performed and pulse wave and/or continuous wave Doppler was performed. Initial Recommendations:      Cardiac Problems:    Abnormal Echo:  Unknown etilogy at this point and will review images. Pt has dysphagia currently and will need to be evaluated by speech. ID is now consulted and BC are currently negative at this time. Further recommendations pending clinical course and attending recommendations. Elevated Bilirubin: Per GI, trending down, US showed Hepatomegaly with diffuse fatty infiltration. Anemia: Per Primary team, Oncology on board    S. Tach: Treat underlying condition as driving factor. No A Fib or arrithmia seen at this point. Thank you very much for requesting a Cardiology Evaluation. We appreciate the opportunity to participate in this patient's care. We will follow along with above stated plan. This is initial management plan but please see attendings consult note for full plan of care.     Krzysztof Chaudhary, [place] - CNP AGACNP-BC

## 2022-07-01 NOTE — CONSULTS
Infectious Disease Consult    Today's Date: 7/1/2022   Admit Date: 6/30/2022    Impression:   · Leukocytosis in the setting of complicated clinical picture: upper lobe consolidations and pan colitis seen on CT; Viral RPP negative, Blood cultures so far NG; TTE with possible vegetation  · Hemolytic anemia: [facility] currently working up for immune process  · Hyperbilirubinemia  · Skin lesions with plaque formation     Plan:   · Discontinue antibiotics at this time as no confirmed infectious source. · Will send hepatitis panel, check RPR, CMV IgG  · Will check bartonella and q fever serologies. · Cardiology has been consulted for NE evaluation. · ID will not plan to see over the weekend, but please call with any questions or concerns. Anti-infectives:   · Vanc  · Zosyn     Subjective:   Date of Consultation:  July 1, 2022  Referring Physician: Estefany Hussein NP    Patient is a 36 y.o. female who had recent admission to [facility] for BLE edema and fatigue worsening over the past 3 weeks. She was found to have acute anemia and required transfusion. At that time she had abnormal LFTs with imaging revealing hepatosplenomegaly and probable hepatic steatosis. She was discharged the next day and had been following with her PCP for hospital follow up. She was also sent to Dr. Adolfo Bay for follow up on her anemia. She returned back to her PCP office for development of rash to abdominal and groin on 6/17. She also reportedly had ongoing bilateral lower extremity swelling and then developed blisters. On 6/30, she was found down on her bathroom floor responding to painful stimuli only. She was noted to have yellowing of her skin as well. She was brought into ED by EMS and admitted to hospitalist service for AMS. She has a PMH of gastric bypass. In regards to the anemia- currently undergoing workup by [facility], thought to be immune related. She also had derm eval for her bullous skin process and had biopsy performed, pending.  In ED, WBC 17.2, CT c/a/p revealed bilateral upper lobe consolidation, and diffuse colon thickening concerning for colitis. ID has been consulted for \"sepsis. \" Today patient is seen resting in bed with her mother and father at bedside. She has just been transferred out of ICU. She denies any n/v/d or f/c. Patient Active Problem List   Diagnosis    Status post gastric bypass for obesity    Essential hypertension    Hypoalbuminemia    Normocytic anemia    Bilateral leg edema    Elevated bilirubin    Acute hypokalemia    Macrocytic anemia    Severe sepsis (HCC)    HCAP (healthcare-associated pneumonia)    UTI (urinary tract infection)    Colitis    Hemolytic anemia (HCC)    Low serum copper for age   Theodoro Milder Hypernatremia    Hyperbilirubinemia    Transaminitis    Hyperammonemia (HCC)     Past Medical History:   Diagnosis Date    Anxiety     Bilateral leg edema 2020    if she doesn't eat enough protein    History of blood transfusion 06/02/2022    Hypertension 02/09/2021    Interstitial cystitis 2017      Family History   Problem Relation Age of Onset    Other Father         doesn't have contact with    Ovarian Cancer Mother 39    Other Mother         fibromyalgia    Rheum Arthritis Mother       Social History     Tobacco Use    Smoking status: Current Every Day Smoker     Packs/day: 0.50    Smokeless tobacco: Never Used    Tobacco comment: Quit smoking: started age 14   Substance Use Topics    Alcohol use: Not Currently     Past Surgical History:   Procedure Laterality Date    CHOLECYSTECTOMY  2012    GASTRIC BYPASS SURGERY  2010    was 283 lbs    TONSILLECTOMY      WRIST SURGERY        Prior to Admission medications    Medication Sig Start Date End Date Taking?  Authorizing Provider   Haylee Fum-FePoly-FA-Vit C-Vit B3 (INTEGRA F PO) Take by mouth    Historical Provider, MD   Potassium Bicarbonate (EFFER-K PO) Take by mouth    Historical Provider, MD   Multiple Vitamins-Minerals (THERAPEUTIC MULTIVITAMIN-MINERALS) tablet Take 1 tablet by mouth daily  Patient not taking: Reported on 2022    Historical Provider, MD   furosemide (LASIX) 20 MG tablet Take 1 tablet by mouth daily 22   Cande Flatter, DO   DULoxetine (CYMBALTA) 30 MG extended release capsule Take 1 capsule by mouth daily 22   Cande Flatter, DO   metoprolol tartrate (LOPRESSOR) 25 MG tablet Take 1 tablet by mouth 2 times daily 22   Cande Flatter, DO       Allergies   Allergen Reactions    Lamictal [Lamotrigine] Rash        Review of Systems:  See HPI    Objective:     Visit Vitals  /63   Pulse (!) 114   Temp 98.5 °F (36.9 °C) (Oral)   Resp (!) 45   Ht 5' 6\" (1.676 m)   Wt 190 lb 12.8 oz (86.5 kg)   SpO2 92%   BMI 30.80 kg/m²     Temp (24hrs), Av.4 °F (36.9 °C), Min:98 °F (36.7 °C), Max:98.7 °F (37.1 °C)       Lines:  Peripheral IV:       Physical Exam:    General:  Alert, cooperative, ill appearing   Eyes:  Pupils equally round and reactive to light.    Mouth/Throat: Mucous membranes normal, oral pharynx clear; dentures   Neck: Supple   Lungs:   Clear to auscultation bilaterally, very shallow work of breathing with tachypnea   CV:  Tachycardic rate and regular rhythm,no audible murmur   Abdomen:   Soft, TTP; bowel sounds hyperactive   Extremities: 2+ pitting edema   Skin: Jaundiced; multiple scabbed over plaque like formations generalized abdomen, breast folds; multiple scars/scabs to bilateral UEs, bilateral LEs with thickened skin and healed blisters to tops of feet   Lymph nodes: Cervical and supraclavicular normal   Lines/Devices:  Intact, no erythema, drainage or tenderness   Psych: Alert and oriented, normal mood affect given the setting       Data Review:     CBC:  Recent Labs     22  1015 22  0326   WBC 17.2* 18.9*   HGB 9.2* 7.6*   HCT 29.1* 23.7*    204       BMP:  Recent Labs     22  1015 22  0326   BUN 8 6    148*   K 3.9 2.9*   * 114* CO2 27 26       LFTS:  Recent Labs     06/30/22  1015 07/01/22  0326   ALT 41 37         Imaging:   Reviewed    Signed By: AMERICA Amos - GUILLERMINA     July 1, 2022

## 2022-07-01 NOTE — CONSULTS
Comprehensive Nutrition Assessment    Type and Reason for Visit: Initial,Consult  Poor Intake/Appetite 5 or More Days (Oncology)    Nutrition Recommendations/Plan:    Recommend cosult SLP if oral diet appropriate per GI to determine if pt can safely take po.  Await multiple labs results.  If unable to progress oral diet within 72 hours consider short term enteral feeding for pt if medically appropriate. Malnutrition Assessment:  Malnutrition Status: At risk for malnutrition (Comment) (hx RYGB, unable to determine hx, declined NFPE)    Nutrition Assessment:  Nutrition History: Hx primarily per mom at bedside, pt answers questions. Mom interjects responses compromised po reported recently unable to quantify any provided information. Pt indicates she has not been taking standard bariatric vitamin and mineral supplementation \"recently\" estimated 2-3 mos per family. Do You Have Any Cultural, Anabaptism, or Ethnic Food Preferences?: No   Nutrition Background:   presented to the ED for cc AMS found by her family on the bathroom floor. She was noted to be jaundiced and only responding to painful stimuli. Had blood transfusion yesterday. 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 Recently had skin biopsy from abdomen due to bullous like process. Results pending. Admitted with severe sepsis, elevated bili, macrocytic anemia, jaundice and HE. Nutrition Interval:  Pt is s/p lactulose enema times one at my visit and now alert, slurred speech at times. Oriented to person and place, repeats questions frequently. Likely with malnutrition potentially related to gastric bypass. Pt reports RYGB in 2010 with revision several years following. Per review of records (only dating back to 2018), pt with concern for an ulceration at that time and with significant dental carriers.   Pt currently with upper and lower dentures. Multiple labs pending. Current Nutrition Therapies:  Diet NPO    Current Intake:   Average Meal Intake: NPO        Anthropometric Measures:  Height: 5' 6\" (167.6 cm)  Current Body Wt: 190 lb 11.2 oz (86.5 kg) (6/30), Weight source: Bed Scale  BMI: 30.8  Admission Body Weight: 184 lb (83.5 kg) (wt source unknown )  Ideal Body Weight (Kg) (Calculated): 59 kg (130 lbs), 146.7 %  Usual Body Wt: 206 lb (93.4 kg) (11/3/21 per EMR review), Percent weight change: -7.4       Edema:Peripheral Vascular  Peripheral Vascular (WDL): Within Defined Limits  Edema: Right lower extremity; Left lower extremity  RLE Edema: +2;Non-pitting  LLE Edema: +2;Non-pitting   BMI Category Obese Class 1 (BMI 30.0-34. 9)  Estimated Daily Nutrient Needs:  Energy (kcal/day): 8817-4946 (18-20 kcal/kg) (Kcal/kg Weight used: 86.5 kg (6/30) Current  Protein (g/day):  (1-1.2 g/kg) Weight Used: (Current) 86.5 kg  Fluid (ml/day):   (1 ml/kcal)    Nutrition Diagnosis:   · Inadequate oral intake related to cognitive or neurological impairment,altered GI function as evidenced by NPO or clear liquid status due to medical condition    Nutrition Interventions:   Food and/or Nutrient Delivery: Continue NPO     Coordination of Nutrition Care: Continue to monitor while inpatient       Goals: Active Goal:  (Toelrate inititiation of nutrition regimen within 3 days)       Nutrition Monitoring and Evaluation:      Food/Nutrient Intake Outcomes: Diet Advancement/Tolerance  Physical Signs/Symptoms Outcomes: Biochemical Data,GI Status,Weight    Discharge Planning:     Too soon to determine    DAMIAN VILLELA RD

## 2022-07-01 NOTE — PROGRESS NOTES
.  [facility] Progress Note             Date: 7/1/2022    GI Problem: hemolytic anemia, jaundice, and HE    History of Present Illness:  Patient is a 36 y.o. female who is seen in consultation for hemolytic anemia, jaundice, and HE. Obtunded in ICU. Viral hep negative. CT shows colitis and fatty liver. Tot bili is 11. Etoh neg. Opiates pos. Cocaine neg. WBC 19. NH3 is 95.     Hospital Medications:  Current Facility-Administered Medications   Medication Dose Route Frequency    potassium chloride (KLOR-CON M) extended release tablet 40 mEq  40 mEq Oral PRN    Or    potassium bicarb-citric acid (EFFER-K) effervescent tablet 40 mEq  40 mEq Oral PRN    Or    potassium chloride 10 mEq/100 mL IVPB (Peripheral Line)  10 mEq IntraVENous PRN    magnesium sulfate 2000 mg in 50 mL IVPB premix  2,000 mg IntraVENous PRN    sodium chloride flush 0.9 % injection 5-40 mL  5-40 mL IntraVENous 2 times per day    sodium chloride flush 0.9 % injection 5-40 mL  5-40 mL IntraVENous PRN    0.9 % sodium chloride infusion   IntraVENous PRN    ondansetron (ZOFRAN-ODT) disintegrating tablet 4 mg  4 mg Oral Q8H PRN    Or    ondansetron (ZOFRAN) injection 4 mg  4 mg IntraVENous Q6H PRN    polyethylene glycol (GLYCOLAX) packet 17 g  17 g Oral Daily PRN    nicotine (NICODERM CQ) 14 MG/24HR 1 patch  1 patch TransDERmal Daily    levalbuterol (XOPENEX) nebulizer solution 1.25 mg  1.25 mg Nebulization Q8H PRN    piperacillin-tazobactam (ZOSYN) 3,375 mg in sodium chloride 0.9 % 50 mL IVPB (mini-bag)  3,375 mg IntraVENous Q8H    dextrose 5 % and 0.45 % sodium chloride infusion   IntraVENous Continuous    tuberculin injection 5 Units  5 Units IntraDERmal Once    vancomycin (VANCOCIN) 1250 mg in sodium chloride 0.9% 250 mL IVPB  1,250 mg IntraVENous Q12H    morphine injection 2 mg  2 mg IntraVENous Q3H PRN       Objective:     Physical Exam:  Vitals:  Visit Vitals  BP (!) 114/57   Pulse (!) 118   Temp 98.4 °F (36.9 °C) (Oral)   Resp (!) 39   Ht 5' 6\" (1.676 m)   Wt 190 lb 12.8 oz (86.5 kg)   SpO2 94%   BMI 30.80 kg/m²       General: No acute distress. Skin:  Extremities and face reveal no rashes. No butcher erythema. No telangiectasias on the chest wall. HEENT: Sclerae anicteric. No oral ulcers. No abnormal pigmentation of the lips. The neck is supple. Cardiovascular: Regular rate and rhythm. No murmurs, gallops, or rubs. Respiratory:  Comfortable breathing  With no accessory muscle use. Clear breath sounds with no wheezes, rales, or rhonchi. GI:  Abdomen nondistended, soft, and nontender. Normal active bowel sounds. No enlargement of the liver or spleen. No masses palpable. Musculoskeletal:  No pitting edema of the lower legs. Extremities have good range of motion. Neurological:  Gross memory appears intact. Patient is alert and oriented. Psychiatric:  Mood appears appropriate with judgement intact. Lymphatic:  No cervical or supraclavicular adenopathy. Laboratory:    Recent Labs     07/01/22  0326   WBC 18.9*   RBC 2.39*   HGB 7.6*   HCT 23.7*         Recent Labs     07/01/22  0326   *   K 2.9*   *   CO2 26   BUN 6     Recent Labs     06/30/22  1015   INR 1.6     No results for input(s): ALB, TP, AML in the last 72 hours. Invalid input(s): TBIL, CBIL, SGOT, GPT, AP, LPSE    Assessment:       A 36 y.o. female with hemolytic anemia, jaundice, and HE. Differential includes Jonathan's, sepsis, DILI, substance abuse, autoimmune liver injury, etc.      Plan:       Ceruloplasmin  Urine copper  BRIDGET, ASMA  TTG  RUQ US  Lactulose enemas  Might need liver biopsy  Check C diff  On abx    Signed By: Brenda Arroyo.  MD Jack     July 1, 2022

## 2022-07-01 NOTE — PROGRESS NOTES
TRANSFER - OUT REPORT:    Verbal report given to Stefanie(name) on Vollee  being transferred to [facility](unit) for routine progression of patient care       Report consisted of patients Situation, Background, Assessment and   Recommendations(SBAR). Information from the following report(s) Nurse Handoff Report was reviewed with the receiving nurse. Opportunity for questions and clarification was provided.       Patient transported with:   Registered Nurse

## 2022-07-01 NOTE — CARE COORDINATION
Chart screened by CM for d/c planning. Pt transferred today from CCU room 3111 to room 515 for progression of care. Please see previous following CM's note dated 7/1 for initial assessment information. Pt is followed by Dr. Sherie Steel at the cancer center. Pt admitted with Dx of Severe sepsis, Non-immune hemalytic anemia with colitis, Cholestatic liver Dz, and Bullous impetigo/purpuric rash. There have been no PT, OT, or SLP consults ordered. Pt is independent with ADLs at baseline. Anticipated d/c plan is for pt to return home with mother when medically stable. No d/c needs identified at the present time. CM will continue to follow and remain available if any needs arise. 07/01/22 1221   Service Assessment   Patient Orientation Alert and Oriented   Cognition Alert   History Provided By Medical Record   Primary Caregiver Self   Support Systems Parent   Patient's Healthcare Decision Maker is: Legal Next of Kin   PCP Verified by CM Yes  Marianne Diallo, )   Last Visit to PCP Within last 3 months   Prior Functional Level Independent in ADLs/IADLs   Current Functional Level Independent in ADLs/IADLs   Can patient return to prior living arrangement Yes   Ability to make needs known: Good   Family able to assist with home care needs: Yes   Would you like for me to discuss the discharge plan with any other family members/significant others, and if so, who?  No   Financial Resources Other (Comment)  ([employer])   Social/Functional History   Lives With Parent   Type of [address] Two level   Home Access Stairs to enter without rails   Entrance Stairs - Number of Steps 2   Receives Help From Family   ADL Assistance Independent   Homemaking Assistance Independent   Ambulation Assistance Independent   Transfer Assistance Independent   Occupation Unemployed   Discharge Planning   Type of Mat[place] Parent   Type of [address] None   Patient expects to be

## 2022-07-01 NOTE — ACP (ADVANCE CARE PLANNING)
Advance Care Planning     General Advance Care Planning (ACP) Conversation    Date of Conversation: 6/30/2022  Conducted with: Patient with Decision Making Capacity    Healthcare Decision Maker:    Primary Decision Maker: Dustin Ware - [facility] - [phone]  Click here to complete [place] including selection of the Healthcare Decision Maker Relationship (ie \"Primary\"). Today we documented Decision Maker(s) consistent with Legal Next of Kin hierarchy.     Content/Action Overview:  Has NO ACP documents/care preferences - refer to ACP Clinical Specialist  Reviewed DNR/DNI and patient elects Full Code (Attempt Resuscitation)  ventilation preferences, hospitalization preferences and resuscitation preferences  N/A    Length of Voluntary ACP Conversation in minutes:  <16 minutes (Non-Billable)    ARIADNA Edmond

## 2022-07-01 NOTE — INTERDISCIPLINARY ROUNDS
Interdisciplinary team rounds were held 7/1/2022 with the following team members:Care Management, Nursing, Nurse Practitioner, Palliative Care, Pastoral Care, Pharmacy, Physician, Respiratory Therapy and Clinical Coordinator and the patient. Plan of care discussed. See clinical pathway and/or care plan for interventions and desired outcomes.

## 2022-07-02 ENCOUNTER — APPOINTMENT (OUTPATIENT)
Dept: MRI IMAGING | Age: 32
DRG: 441 | End: 2022-07-02
Payer: COMMERCIAL

## 2022-07-02 ENCOUNTER — APPOINTMENT (OUTPATIENT)
Dept: CARDIAC CATH/INVASIVE PROCEDURES | Age: 32
DRG: 441 | End: 2022-07-02
Payer: COMMERCIAL

## 2022-07-02 ENCOUNTER — APPOINTMENT (OUTPATIENT)
Dept: CT IMAGING | Age: 32
DRG: 441 | End: 2022-07-02
Payer: COMMERCIAL

## 2022-07-02 VITALS
HEART RATE: 110 BPM | TEMPERATURE: 98.2 F | OXYGEN SATURATION: 95 % | RESPIRATION RATE: 30 BRPM | WEIGHT: 190 LBS | SYSTOLIC BLOOD PRESSURE: 120 MMHG | DIASTOLIC BLOOD PRESSURE: 80 MMHG | HEIGHT: 66 IN | BODY MASS INDEX: 30.53 KG/M2

## 2022-07-02 LAB
ALBUMIN SERPL-MCNC: 1.5 G/DL (ref 3.5–5)
ALBUMIN/GLOB SERPL: 0.5 {RATIO} (ref 1.2–3.5)
ALP SERPL-CCNC: 106 U/L (ref 50–136)
ALT SERPL-CCNC: 39 U/L (ref 12–65)
AMMONIA PLAS-SCNC: 131 UMOL/L (ref 11–32)
ANION GAP SERPL CALC-SCNC: 4 MMOL/L (ref 7–16)
AST SERPL-CCNC: 46 U/L (ref 15–37)
BASOPHILS # BLD: 0 K/UL (ref 0–0.2)
BASOPHILS NFR BLD: 0 % (ref 0–2)
BILIRUB SERPL-MCNC: 12.1 MG/DL (ref 0.2–1.1)
BUN SERPL-MCNC: 6 MG/DL (ref 6–23)
CALCIUM SERPL-MCNC: 7.3 MG/DL (ref 8.3–10.4)
CERULOPLASMIN SERPL-MCNC: 8.8 MG/DL (ref 19–39)
CHLORIDE SERPL-SCNC: 114 MMOL/L (ref 98–107)
CO2 SERPL-SCNC: 27 MMOL/L (ref 21–32)
CREAT SERPL-MCNC: 0.5 MG/DL (ref 0.6–1)
DIFFERENTIAL METHOD BLD: ABNORMAL
ECHO BSA: 2 M2
EOSINOPHIL # BLD: 0.2 K/UL (ref 0–0.8)
EOSINOPHIL NFR BLD: 1 % (ref 0.5–7.8)
ERYTHROCYTE [DISTWIDTH] IN BLOOD BY AUTOMATED COUNT: 26.8 % (ref 11.9–14.6)
GLOBULIN SER CALC-MCNC: 2.9 G/DL (ref 2.3–3.5)
GLUCOSE SERPL-MCNC: 105 MG/DL (ref 65–100)
HAV IGM SER QL: NONREACTIVE
HBV CORE IGM SER QL: NONREACTIVE
HBV SURFACE AG SER QL: NONREACTIVE
HCT VFR BLD AUTO: 21.9 % (ref 35.8–46.3)
HCV AB SER QL: NONREACTIVE
HGB BLD-MCNC: 7 G/DL (ref 11.7–15.4)
IGE SERPL-ACNC: 1123 IU/ML (ref 6–495)
IMM GRANULOCYTES # BLD AUTO: 0.2 K/UL (ref 0–0.5)
IMM GRANULOCYTES NFR BLD AUTO: 1 % (ref 0–5)
INR PPP: 1.6
LV EF: 68 %
LVEF MODALITY: NORMAL
LYMPHOCYTES # BLD: 4.7 K/UL (ref 0.5–4.6)
LYMPHOCYTES NFR BLD: 30 % (ref 13–44)
MAGNESIUM SERPL-MCNC: 2 MG/DL (ref 1.8–2.4)
MCH RBC QN AUTO: 32 PG (ref 26.1–32.9)
MCHC RBC AUTO-ENTMCNC: 32 G/DL (ref 31.4–35)
MCV RBC AUTO: 100 FL (ref 79.6–97.8)
MONOCYTES # BLD: 1.2 K/UL (ref 0.1–1.3)
MONOCYTES NFR BLD: 7 % (ref 4–12)
NEUTS SEG # BLD: 9.7 K/UL (ref 1.7–8.2)
NEUTS SEG NFR BLD: 61 % (ref 43–78)
NRBC # BLD: 0 K/UL (ref 0–0.2)
PLATELET # BLD AUTO: 177 K/UL (ref 150–450)
PMV BLD AUTO: 11.8 FL (ref 9.4–12.3)
POTASSIUM SERPL-SCNC: 3.4 MMOL/L (ref 3.5–5.1)
PROT SERPL-MCNC: 4.4 G/DL (ref 6.3–8.2)
PROTHROMBIN TIME: 19.1 SEC (ref 12.6–14.5)
RBC # BLD AUTO: 2.19 M/UL (ref 4.05–5.2)
SODIUM SERPL-SCNC: 145 MMOL/L (ref 136–145)
WBC # BLD AUTO: 16.1 K/UL (ref 4.3–11.1)

## 2022-07-02 PROCEDURE — 70470 CT HEAD/BRAIN W/O & W/DYE: CPT

## 2022-07-02 PROCEDURE — 99024 POSTOP FOLLOW-UP VISIT: CPT | Performed by: INTERNAL MEDICINE

## 2022-07-02 PROCEDURE — 6370000000 HC RX 637 (ALT 250 FOR IP): Performed by: FAMILY MEDICINE

## 2022-07-02 PROCEDURE — 93325 DOPPLER ECHO COLOR FLOW MAPG: CPT | Performed by: INTERNAL MEDICINE

## 2022-07-02 PROCEDURE — 93312 ECHO TRANSESOPHAGEAL: CPT

## 2022-07-02 PROCEDURE — 85025 COMPLETE CBC W/AUTO DIFF WBC: CPT

## 2022-07-02 PROCEDURE — 2580000003 HC RX 258: Performed by: FAMILY MEDICINE

## 2022-07-02 PROCEDURE — 6370000000 HC RX 637 (ALT 250 FOR IP): Performed by: INTERNAL MEDICINE

## 2022-07-02 PROCEDURE — 92610 EVALUATE SWALLOWING FUNCTION: CPT

## 2022-07-02 PROCEDURE — 99152 MOD SED SAME PHYS/QHP 5/>YRS: CPT | Performed by: INTERNAL MEDICINE

## 2022-07-02 PROCEDURE — 93320 DOPPLER ECHO COMPLETE: CPT | Performed by: INTERNAL MEDICINE

## 2022-07-02 PROCEDURE — 6360000004 HC RX CONTRAST MEDICATION: Performed by: INTERNAL MEDICINE

## 2022-07-02 PROCEDURE — 6360000002 HC RX W HCPCS: Performed by: INTERNAL MEDICINE

## 2022-07-02 PROCEDURE — 93312 ECHO TRANSESOPHAGEAL: CPT | Performed by: INTERNAL MEDICINE

## 2022-07-02 PROCEDURE — 99152 MOD SED SAME PHYS/QHP 5/>YRS: CPT

## 2022-07-02 PROCEDURE — 85610 PROTHROMBIN TIME: CPT

## 2022-07-02 PROCEDURE — B246ZZ4 ULTRASONOGRAPHY OF RIGHT AND LEFT HEART, TRANSESOPHAGEAL: ICD-10-PCS | Performed by: INTERNAL MEDICINE

## 2022-07-02 PROCEDURE — 80053 COMPREHEN METABOLIC PANEL: CPT

## 2022-07-02 PROCEDURE — 6370000000 HC RX 637 (ALT 250 FOR IP)

## 2022-07-02 PROCEDURE — 6360000002 HC RX W HCPCS: Performed by: HOSPITALIST

## 2022-07-02 PROCEDURE — 99233 SBSQ HOSP IP/OBS HIGH 50: CPT | Performed by: PEDIATRICS

## 2022-07-02 PROCEDURE — 83735 ASSAY OF MAGNESIUM: CPT

## 2022-07-02 PROCEDURE — 82140 ASSAY OF AMMONIA: CPT

## 2022-07-02 PROCEDURE — 94760 N-INVAS EAR/PLS OXIMETRY 1: CPT

## 2022-07-02 RX ORDER — FENTANYL CITRATE 50 UG/ML
INJECTION, SOLUTION INTRAMUSCULAR; INTRAVENOUS
Status: COMPLETED | OUTPATIENT
Start: 2022-07-02 | End: 2022-07-02

## 2022-07-02 RX ORDER — MIDAZOLAM HYDROCHLORIDE 2 MG/2ML
INJECTION, SOLUTION INTRAMUSCULAR; INTRAVENOUS
Status: COMPLETED | OUTPATIENT
Start: 2022-07-02 | End: 2022-07-02

## 2022-07-02 RX ORDER — LIDOCAINE HYDROCHLORIDE 20 MG/ML
SOLUTION OROPHARYNGEAL
Status: COMPLETED | OUTPATIENT
Start: 2022-07-02 | End: 2022-07-02

## 2022-07-02 RX ORDER — ONDANSETRON 2 MG/ML
INJECTION INTRAMUSCULAR; INTRAVENOUS
Status: COMPLETED | OUTPATIENT
Start: 2022-07-02 | End: 2022-07-02

## 2022-07-02 RX ADMIN — MORPHINE SULFATE 2 MG: 2 INJECTION, SOLUTION INTRAMUSCULAR; INTRAVENOUS at 01:29

## 2022-07-02 RX ADMIN — LACTULOSE 20 G: 20 SOLUTION ORAL at 16:56

## 2022-07-02 RX ADMIN — LORAZEPAM 0.5 MG: 0.5 TABLET ORAL at 14:19

## 2022-07-02 RX ADMIN — MIDAZOLAM HYDROCHLORIDE 1 MG: 1 INJECTION, SOLUTION INTRAMUSCULAR; INTRAVENOUS at 10:26

## 2022-07-02 RX ADMIN — SODIUM CHLORIDE, PRESERVATIVE FREE 10 ML: 5 INJECTION INTRAVENOUS at 09:03

## 2022-07-02 RX ADMIN — FENTANYL CITRATE 25 MCG: 50 INJECTION INTRAMUSCULAR; INTRAVENOUS at 10:30

## 2022-07-02 RX ADMIN — FENTANYL CITRATE 25 MCG: 50 INJECTION INTRAMUSCULAR; INTRAVENOUS at 10:26

## 2022-07-02 RX ADMIN — LACTULOSE 20 G: 20 SOLUTION ORAL at 08:47

## 2022-07-02 RX ADMIN — ONDANSETRON 4 MG: 2 INJECTION INTRAMUSCULAR; INTRAVENOUS at 10:25

## 2022-07-02 RX ADMIN — MIDAZOLAM HYDROCHLORIDE 1 MG: 1 INJECTION, SOLUTION INTRAMUSCULAR; INTRAVENOUS at 10:28

## 2022-07-02 RX ADMIN — MIDAZOLAM HYDROCHLORIDE 1 MG: 1 INJECTION, SOLUTION INTRAMUSCULAR; INTRAVENOUS at 10:27

## 2022-07-02 RX ADMIN — MORPHINE SULFATE 2 MG: 2 INJECTION, SOLUTION INTRAMUSCULAR; INTRAVENOUS at 05:00

## 2022-07-02 RX ADMIN — POTASSIUM CHLORIDE AND DEXTROSE MONOHYDRATE: 150; 5 INJECTION, SOLUTION INTRAVENOUS at 01:37

## 2022-07-02 RX ADMIN — MIDAZOLAM HYDROCHLORIDE 1 MG: 1 INJECTION, SOLUTION INTRAMUSCULAR; INTRAVENOUS at 10:38

## 2022-07-02 RX ADMIN — MORPHINE SULFATE 2 MG: 2 INJECTION, SOLUTION INTRAMUSCULAR; INTRAVENOUS at 16:56

## 2022-07-02 RX ADMIN — MORPHINE SULFATE 2 MG: 2 INJECTION, SOLUTION INTRAMUSCULAR; INTRAVENOUS at 09:06

## 2022-07-02 RX ADMIN — MIDAZOLAM HYDROCHLORIDE 1 MG: 1 INJECTION, SOLUTION INTRAMUSCULAR; INTRAVENOUS at 10:30

## 2022-07-02 RX ADMIN — LACTULOSE 20 G: 20 SOLUTION ORAL at 13:16

## 2022-07-02 RX ADMIN — FENTANYL CITRATE 25 MCG: 50 INJECTION INTRAMUSCULAR; INTRAVENOUS at 10:39

## 2022-07-02 RX ADMIN — IOPAMIDOL 100 ML: 755 INJECTION, SOLUTION INTRAVENOUS at 15:03

## 2022-07-02 RX ADMIN — LIDOCAINE HYDROCHLORIDE 15 ML: 20 SOLUTION ORAL; TOPICAL at 10:10

## 2022-07-02 ASSESSMENT — PAIN SCALES - GENERAL
PAINLEVEL_OUTOF10: 10
PAINLEVEL_OUTOF10: 8
PAINLEVEL_OUTOF10: 4
PAINLEVEL_OUTOF10: 10
PAINLEVEL_OUTOF10: 8
PAINLEVEL_OUTOF10: 4
PAINLEVEL_OUTOF10: 7

## 2022-07-02 ASSESSMENT — PAIN DESCRIPTION - DESCRIPTORS
DESCRIPTORS: ACHING
DESCRIPTORS: ACHING

## 2022-07-02 ASSESSMENT — PAIN DESCRIPTION - LOCATION
LOCATION: ABDOMEN

## 2022-07-02 ASSESSMENT — PAIN DESCRIPTION - ORIENTATION
ORIENTATION: MID
ORIENTATION: MID

## 2022-07-02 NOTE — PROGRESS NOTES
[facility] Hematology & Oncology        Inpatient Hematology / Oncology Progress Note      Admission Date: 2022  9:47 AM  Reason for Admission/Hospital Course: Severe sepsis ([address] [A41.9, R65.20]      24 Hour Events:  Family at bedside   Opens eyes to stimuli  Confused   Denies pain      ROS: Unable to obtain    10 point review of systems is otherwise negative with the exception of the elements mentioned above in the HPI. Allergies   Allergen Reactions    Lamictal [Lamotrigine] Rash       OBJECTIVE:  Patient Vitals for the past 8 hrs:   BP Temp Temp src Pulse Resp SpO2 Height Weight   22 1613 120/80 -- -- (!) 108 -- 95 % -- --   22 1137 120/78 99 °F (37.2 °C) Oral (!) 110 (!) 54 90 % -- --   22 1054 119/76 -- -- -- -- -- 5' 6\" (1.676 m) 190 lb (86.2 kg)   22 0936 -- -- -- -- 18 -- -- --     Temp (24hrs), Av °F (37.2 °C), Min:98.8 °F (37.1 °C), Max:99.5 °F (37.5 °C)    No intake/output data recorded. Physical Exam:  Constitutional: Ill appearing female in no acute distress lying in ICU bed   HEENT: Normocephalic and atraumatic. Oropharynx is clear, mucous membranes are dry. Extraocular muscles are intact. Sclerae slightly yellow   Lymph node   No palpable submandibular, cervical, supraclavicular, axillary or inguinal lymph nodes. Skin Bullous lesions, healing, slightly improved. Jaundiced. Respiratory Lungs are clear to auscultation bilaterally without wheezes, rales or rhonchi, normal air exchange without accessory muscle use. RR 38   CVS Tachy rate, regular rhythm and normal S1 and S2. No murmurs, gallops, or rubs. Abdomen Soft, nontender and nondistended, normoactive bowel sounds. Neuro Follows some commands   MSK Normal range of motion in general.  No edema and no tenderness.    Psych AMS       Labs:      Recent Labs     22  1015 22  1015 22  0326 22  1142 22  0308   WBC 17.2*   < > 18.9* 16.5* 16.1*   RBC 2.95*   < > 2.39* 2.48* 2.19*   HGB 9.2*   < > 7.6* 7.8* 7.0*   HCT 29.1*   < > 23.7* 22.3* 21.9*   MCV 98.6*   < > 99.2* 90 100.0*   MCH 31.2   < > 31.8 31.5 32.0   MCHC 31.6   < > 32.1 35.0 32.0   RDW 26.5*   < > 27.2* 21.7* 26.8*      < > 204 211 177   MPV 11.7  --  12.0  --  11.8    < > = values in this interval not displayed. Recent Labs     06/30/22  1015 06/30/22  1015 07/01/22  0326 07/01/22  1341 07/02/22  0308     --  148*  --  145   K 3.9   < > 2.9* 3.2* 3.4*   *  --  114*  --  114*   CO2 27  --  26  --  27   BUN 8  --  6  --  6   GFRAA >60  --  >60  --  >60   GLOB 3.5  --  3.0  --  2.9   MG 2.2  --  2.0  --  2.0    < > = values in this interval not displayed.          Imaging:    Medications:  Current Facility-Administered Medications   Medication Dose Route Frequency    potassium chloride (KLOR-CON M) extended release tablet 40 mEq  40 mEq Oral PRN    Or    potassium bicarb-citric acid (EFFER-K) effervescent tablet 40 mEq  40 mEq Oral PRN    Or    potassium chloride 10 mEq/100 mL IVPB (Peripheral Line)  10 mEq IntraVENous PRN    magnesium sulfate 2000 mg in 50 mL IVPB premix  2,000 mg IntraVENous PRN    dextrose 5 % with KCl 20 mEq infusion   IntraVENous Continuous    lactulose (CHRONULAC) 10 GM/15ML solution 20 g  20 g Oral 4x Daily    LORazepam (ATIVAN) tablet 0.5 mg  0.5 mg Oral Q6H PRN    nystatin (MYCOSTATIN) ointment   Topical BID PRN    zinc oxide (DESITIN) 40 % ointment   Topical 4x Daily PRN    sodium chloride flush 0.9 % injection 5-40 mL  5-40 mL IntraVENous 2 times per day    sodium chloride flush 0.9 % injection 5-40 mL  5-40 mL IntraVENous PRN    0.9 % sodium chloride infusion   IntraVENous PRN    ondansetron (ZOFRAN-ODT) disintegrating tablet 4 mg  4 mg Oral Q8H PRN    Or    ondansetron (ZOFRAN) injection 4 mg  4 mg IntraVENous Q6H PRN    polyethylene glycol (GLYCOLAX) packet 17 g  17 g Oral Daily PRN    nicotine (NICODERM CQ) 14 MG/24HR 1 patch  1 patch TransDERmal Daily    levalbuterol (XOPENEX) nebulizer solution 1.25 mg  1.25 mg Nebulization Q8H PRN    morphine injection 2 mg  2 mg IntraVENous Q3H PRN       PLAN:  Non immune hemalytic anemia with colitis, cholestatic liver disease and bullous impetigo/purpuric rash suggestive of inflammatory or infectious condition  -ANCA panel negative     Recommend:  -repeat Larissa and send super larissa to [facility]  -complete gely work up: mri brain, 24 hour urine copper, ceruloplasmin  -BMA/Bx with IR (was scheduled for tomorrow)  -complete coags: PT and PT mixing study; PTT and PTT mixing study; lupus anticoagulatnt panel + but suspect LA and not factor deficiency  -factor VII (seven, 7) activity level  -consult GI, evaluate colitis and liver disease, consider liver biopsy, colonoscopy  -vitamin C levels  -IGLESIA, UDS  -PNH by flow cytometry   -if return of encephalopathy, neuro consult  -reassurance, hold steroids until initial onc work up returned or performed    Prior labs:  Larissa negative  periopheral smear multiple target cells c/w liver injury likely; no shistocytes, mild clumping no blasts  Peripheral flow negative  Elevated PT and TCT  Low CH50  Elevated IGG  Low copper   Ceruloplasmin pending    7/1CT-colitis and fatty liver-GI following- lactulose enemas ordered-checking for C-diff-on vanc/zosyn-RUQ US ordered-may need liver biopsy-24 hour urine copper pending. UA+. UC pending. BC pending. WBC 18.9 (17.2). HGB 7.6 (9.2). Bili 11.0 (13.9). UDS +opiates. Send out labs ordered. Hold on BMbx for now. Pain managed by primary. RD consulted. ID consulted. Skin biopsy results pending. 7/2 Appears confused during rounding. work up is pending as send out labs are still in process. ID stopped antibiotics. Cardiology plans for NE once able.                 Lisa Argueta Credit, [address] [facility]ematology and On[facility]/Radiation Oncology   [address] [address]  Office : [phone]  Fax : [phone]

## 2022-07-02 NOTE — PROGRESS NOTES
.  [facility] Progress Note             Date: 7/2/2022    GI Problem: AMS, jaundice, liver failure    History of Present Illness:  Patient is a 36 y.o. female with PMH gastric bypass who is seen in consultation for AMS, jaundice, liver failure. Bili is 12. Ceruloplasmin came back low at 8.8. Echo showed possible aortic valve vegetation. She has nonimmune hemolytic anemia. Has required blood transfusions. She has a weird bullous rash. AST/ ALT 39-40, AlkP 106. Etoh and tylenol negative. Viral hep negative. INR 1.6.  US showed fatty liver. CT showed colitis, but not having diarrhea. WBC 15-17 range.           Hospital Medications:  Current Facility-Administered Medications   Medication Dose Route Frequency    potassium chloride (KLOR-CON M) extended release tablet 40 mEq  40 mEq Oral PRN    Or    potassium bicarb-citric acid (EFFER-K) effervescent tablet 40 mEq  40 mEq Oral PRN    Or    potassium chloride 10 mEq/100 mL IVPB (Peripheral Line)  10 mEq IntraVENous PRN    magnesium sulfate 2000 mg in 50 mL IVPB premix  2,000 mg IntraVENous PRN    dextrose 5 % with KCl 20 mEq infusion   IntraVENous Continuous    lactulose (CHRONULAC) 10 GM/15ML solution 20 g  20 g Oral 4x Daily    LORazepam (ATIVAN) tablet 0.5 mg  0.5 mg Oral Q6H PRN    nystatin (MYCOSTATIN) ointment   Topical BID PRN    zinc oxide (DESITIN) 40 % ointment   Topical 4x Daily PRN    sodium chloride flush 0.9 % injection 5-40 mL  5-40 mL IntraVENous 2 times per day    sodium chloride flush 0.9 % injection 5-40 mL  5-40 mL IntraVENous PRN    0.9 % sodium chloride infusion   IntraVENous PRN    ondansetron (ZOFRAN-ODT) disintegrating tablet 4 mg  4 mg Oral Q8H PRN    Or    ondansetron (ZOFRAN) injection 4 mg  4 mg IntraVENous Q6H PRN    polyethylene glycol (GLYCOLAX) packet 17 g  17 g Oral Daily PRN    nicotine (NICODERM CQ) 14 MG/24HR 1 patch  1 patch TransDERmal Daily    levalbuterol (XOPENEX) nebulizer solution 1.25 mg  1.25 mg Nebulization Q8H PRN    morphine injection 2 mg  2 mg IntraVENous Q3H PRN       Objective:     Physical Exam:  Vitals:  Visit Vitals  /76   Pulse (!) 111   Temp 98.8 °F (37.1 °C) (Oral)   Resp 16   Ht 5' 6\" (1.676 m)   Wt 190 lb 12.8 oz (86.5 kg)   SpO2 90%   BMI 30.80 kg/m²       General: No acute distress. Skin:  Extremities and face reveal no rashes. No butcher erythema. No telangiectasias on the chest wall. HEENT: Sclerae anicteric. No oral ulcers. No abnormal pigmentation of the lips. The neck is supple. Cardiovascular: Regular rate and rhythm. No murmurs, gallops, or rubs. Respiratory:  Comfortable breathing  With no accessory muscle use. Clear breath sounds with no wheezes, rales, or rhonchi. GI:  Abdomen nondistended, soft, and nontender. Normal active bowel sounds. No enlargement of the liver or spleen. No masses palpable. Musculoskeletal:  No pitting edema of the lower legs. Extremities have good range of motion. Neurological:  Gross memory appears intact. Patient is alert and oriented. Psychiatric:  Mood appears appropriate with judgement intact. Lymphatic:  No cervical or supraclavicular adenopathy. Laboratory:    Recent Labs     07/02/22  0308   WBC 16.1*   RBC 2.19*   HGB 7.0*   HCT 21.9*         Recent Labs     07/02/22  0308      K 3.4*   *   CO2 27   BUN 6     Recent Labs     07/01/22  1142   INR 1.6   APTT 52.0*     No results for input(s): ALB, TP, AML in the last 72 hours. Invalid input(s): TBIL, CBIL, SGOT, GPT, AP, LPSE    Assessment:       A 36 y.o. female with encephalopathy, jaundice, coagulopathy and now low ceruloplasmin. Concern for Jonathan's  Also has echo showing possible aortic valve vegetation. Plan:       Very concerned about acute liver failure from  [place]. I discussed her with Theresa Ly at [facility] transplant hepatology.   He favors urgent NE to rule out endocarditis, and then if that is negative then transfer to [facility] for liver transplant eval.  He says little benefit in liver biopsy or starting trientine or d penicillamine at present. Urine copper is ordered. I have asked cards to proceed with NE. On zosyn + vanco per ID. Lidia Loving [phone] is on call for [facility] transplant hepatology this weekend. Signed By: Halie Santiago MD     July 2, 2022

## 2022-07-02 NOTE — PROGRESS NOTES
[facility] CRITICAL CARE OUTREACH NURSE PROGRESS REPORT      SUBJECTIVE: Called to assess patient secondary to transfer from ICU.      @Tipping BucketTapFunder([id number])@  Vitals:    07/02/22 0725 07/02/22 0936 07/02/22 1054 07/02/22 1137   BP: 119/76  119/76 120/78   Pulse: (!) 111   (!) 110   Resp: (!) 46 18  (!) 54   Temp: 98.8 °F (37.1 °C)   99 °F (37.2 °C)   TempSrc: Oral   Oral   SpO2: 90%   90%   Weight:   190 lb (86.2 kg)    Height:   5' 6\" (1.676 m)         LAB DATA:    Recent Labs     06/30/22  1015 06/30/22  1015 07/01/22  0326 07/01/22  1341 07/02/22  0308     --  148*  --  145   K 3.9   < > 2.9* 3.2* 3.4*   *  --  114*  --  114*   CO2 27  --  26  --  27   BUN 8  --  6  --  6   GFRAA >60  --  >60  --  >60   MG 2.2  --  2.0  --  2.0   GLOB 3.5  --  3.0  --  2.9   ALT 41  --  37  --  39    < > = values in this interval not displayed. Recent Labs     07/01/22  0326 07/01/22  1142 07/02/22  0308   WBC 18.9* 16.5* 16.1*   HGB 7.6* 7.8* 7.0*   HCT 23.7* 22.3* 21.9*    211 177          OBJECTIVE: On arrival to room, I found patient to be resting in bed, eyes closed, no distress. Pain Assessment  @BSHSIFLOW(1171)@             @BSHSIFLOW(1180)@             @BSIFLOW(1188)@              ASSESSMENT:  Patient awakens easily to voice however drowsy. Pt able to answer all orientation questions and follow commands however keeps dozing off at times. Pt remains on room air and tolerating, O2sat 92%, . Pt reports mild pain but has just received pain medications. Pt educated on importance of being able to be awake/alert enough to take scheduled medications for example pts ammonia level has increased because pt has not been able to take oral lactulose. Pt agrees to take it to avoid lactulose enema. Pt potassium also low, per cards order they want K of >4, primary RN to replace. PLAN:  Will continue to follow per outreach program protocol.      Bridger Sanabria RN

## 2022-07-02 NOTE — PROGRESS NOTES
[facility] CRITICAL CARE OUTREACH NURSE PROGRESS REPORT      SUBJECTIVE: Called to assess patient secondary to transfer from ICU. Vitals:    07/01/22 1930 07/01/22 1939 07/01/22 1944 07/01/22 2009   BP:       Pulse:   (!) 132    Resp: (!) 50 (!) 52 (!) 60 (!) 54   Temp:       TempSrc:       SpO2:       Weight:       Height:            OBJECTIVE: On arrival to room, I found patient to be resting in bed. ASSESSMENT:  Pt is drowsy but easily arousable and oriented x4. RR is the 30's with shallow breaths. O2 sat 94% on RA. Pt c/o abdominal pain and was given PRN Morphine at 1940. Discussed pt with primary RN at bedside. PLAN:  Will continue to follow per outreach program protocol. Yarely Valdes RN.

## 2022-07-02 NOTE — PROGRESS NOTES
Gastroenterology    [facility]/Hepatology in [place], [place] did not accept pt in transfer due to insurance. [address] ICU and Hepatology have accepted pt in transfer. Dr. Carina Nam spoke with Dr. Rincon who is the accepting provider. Dr. Carina Nam is updating Dr. Deshaun Lemus also. Ordering STAT head CT scan prior to transfer to [address] due to AMS. [facility]

## 2022-07-02 NOTE — PROGRESS NOTES
[facility] CARDIOLOGY PROGRESS NOTE           7/2/2022 8:45 AM    Admit Date: 6/30/2022      Subjective:   Patient appears confused this AM.  Difficult to understand speech. Is able to state she is in the hospital.  No fevers overnight. Echo yesterday showed normal LV function but concerning for possible aortic valve vegetation. Blood cultures from June 30 are negative to date. ID has seen patient. ROS:  Cardiovascular:  As noted above    Objective:      Vitals:    07/01/22 2305 07/02/22 0159 07/02/22 0320 07/02/22 0725   BP: 123/69  123/72 119/76   Pulse: (!) 114  (!) 113 (!) 111   Resp: (!) 44 (!) 39 (!) 36 16   Temp: 98.9 °F (37.2 °C)  98.8 °F (37.1 °C) 98.8 °F (37.1 °C)   TempSrc: Oral  Oral Oral   SpO2: 92%  95% 90%   Weight:       Height:           Physical Exam:  General-No Acute Distress  Neck- supple, no JVD  CV- regular rate and rhythm no MRG  Lung- clear bilaterally  Abd- soft, nontender, nondistended  Ext- no edema bilaterally. Skin- warm and dry      Data Review:   Recent Labs     07/02/22  0308 07/01/22  1142 07/01/22  0326   WBC 16.1* 16.5* 18.9*   HGB 7.0* 7.8* 7.6*   HCT 21.9* 22.3* 23.7*   .0* 90 99.2*    211 204       Recent Labs     07/02/22  0308      K 3.4*   *   CO2 27   BUN 6   CREATININE 0.50*   GLUCOSE 105*   CALCIUM 7.3*   PROT 4.4*   LABALBU 1.5*   BILITOT 12.1*   ALKPHOS 106   AST 46*   ALT 39   LABGLOM >60   GFRAA >60   GLOB 2.9       Recent Labs     06/30/22  1015   CKTOTAL 161              Assessment/Plan:     Active Hospital Problems    Hypernatremia  Improved. Daily BMP. Hemolytic anemia (HCC)  Worse today. Daily CBC with transfusion per primary team.     Possible Aortic valve vegetation: We will plan NE once her clinical course becomes more stable and she is more lucid. Would need to obtain consent. Appreciate ID input.     Altered mental status  Defer management to primary team.                Celestine Valencia, MD

## 2022-07-02 NOTE — PROGRESS NOTES
Called report to Kayla Barillas at [phone] pt going to [facility] bed 5   Kayla Barillas had no questions

## 2022-07-02 NOTE — PROGRESS NOTES
Case discussed with Dr. Holland Ahumada. Clinically important for transesophageal echocardiogram to be performed today to rule out endocarditis. I performed NE and there is no evidence of valvular vegetations. Suspect artifact on transthoracic study. No evidence of endocarditis. Discussed with Dr. Holland Ahumada. Cardiac condition stable. Will sign off. Please call with any questions or clinical changes. Appreciate consultation.         Ember Walton MD

## 2022-07-02 NOTE — PROGRESS NOTES
Hospitalist   Admit Date:  2022  9:47 AM   Name:  Quiana Saucedo   Age:  36 y.o. Sex:  female  :  1990   MRN:  [id number]   Room:        Hospital Course & Interval History:     37 y/o female with a h/o HTN, anxiety, gastric bypass and macrocytic anemia who was admitted to our service on  with acute metabolic encephalopathy. Has been following Hematology outpatient for possible hemolytic anemia. She was found at home by family confused and jaundiced. Labs showed bili 13.9. Head CT unremarkable. Hematology and GI consulted. She was admitted to ICU. Transfer orders for the floor on . Today, negative NE, no fever or acute events    Assessment & Plan:     1- Leukocytosis + RR >20 + acute encephalopathy. No source of infection. Findings:   - Pulmonary source? CT c/a/p showed b/l upper lobe consolidations, pan-colitis and hepatic steatosis. Resp viral swab is negative. Con't abx. ID was consulted. - Blood cultures are negative so far. TTE noted a \"possible vegetation in parasternal long axis view\". NE negative. Has hx of marantic endocarditis. 2- Hyperbilirubinemia  3- Hepatic encephalopathy with  Hyperammonemia  4- Hypoalbuminemia w/ B/l LE edema  5- Hypokalemia, replaced  6- Hypernatremia, mild, improved  7- Hx of HTN and macrocytic anemia, to follow. Stable so far.     Rx:  Antibiotics stopped, per ID  Concern about Jonathan dz, per Gi, follow pending studies  Follow MRI brain, studies for Q fever and bartonella  Lactulose  AM lab    Dispo: [address] Problems           Last Modified POA    * (Principal) Severe sepsis ([facility] [address] 2022 Yes    Hypoalbuminemia 2022 Yes    Normocytic anemia 2022 Yes    Bilateral leg edema 2022 Yes    Elevated bilirubin 2022 Yes    Acute hypokalemia 2022 Yes    Macrocytic anemia 2022 Yes    HCAP (healthcare-associated pneumonia) 2022 Yes    UTI (urinary tract infection) 2022 Yes    Colitis 2022 Yes Hemolytic anemia (HCC) 6/30/2022 Yes    Low serum copper for age 6/30/2022 Yes    Hypernatremia 7/1/2022 Yes    Hyperbilirubinemia 7/1/2022 Yes    Transaminitis 7/1/2022 Yes    Hyperammonemia ([address] 7/1/2022 Yes    Status post gastric bypass for obesity 6/30/2022 Yes    Essential hypertension 6/30/2022 Yes    Overview Signed 3/19/2022  6:45 PM by Doris Wise     Last Assessment & Plan:   Has been having elevated BP readings in doctors offices over the past few   months. Tried lisinopril without AE, changed to amlodipine 5 mg daily which she is   tolerating well no constipation or LE edema  No longer having headaches in the mornings  Insurance company wouldn't fill her BP cuff  Plan: ambulatory monitoring, continue current therapy                 Objective:     Patient Vitals for the past 24 hrs:   Temp Pulse Resp BP SpO2   07/02/22 1137 99 °F (37.2 °C) (!) 110 (!) 54 120/78 90 %   07/02/22 1054 -- -- -- 119/76 --   07/02/22 0936 -- -- 18 -- --   07/02/22 0725 98.8 °F (37.1 °C) (!) 111 (!) 46 119/76 90 %   07/02/22 0320 98.8 °F (37.1 °C) (!) 113 (!) 36 123/72 95 %   07/02/22 0159 -- -- (!) 39 -- --   07/01/22 2305 98.9 °F (37.2 °C) (!) 114 (!) 44 123/69 92 %   07/01/22 2009 -- -- (!) 54 -- --   07/01/22 1944 -- (!) 132 (!) 60 -- --   07/01/22 1939 -- -- (!) 52 -- --   07/01/22 1930 -- -- (!) 50 -- --   07/01/22 1920 99.5 °F (37.5 °C) (!) 124 (!) 56 133/70 93 %   07/01/22 1512 98.6 °F (37 °C) (!) 125 29 123/83 96 %   07/01/22 1433 -- (!) 121 28 117/60 96 %   07/01/22 1403 -- (!) 120 (!) 31 (!) 116/59 95 %   07/01/22 1333 -- (!) 114 (!) 45 130/63 92 %   07/01/22 1303 -- (!) 110 -- 121/74 95 %       Estimated body mass index is 30.67 kg/m² as calculated from the following:    Height as of this encounter: 5' 6\" (1.676 m). Weight as of this encounter: 190 lb (86.2 kg).     Intake/Output Summary (Last 24 hours) at 7/2/2022 1253  Last data filed at 7/2/2022 0331  Gross per 24 hour   Intake 1005 ml   Output --   Net 1005 ml         Physical Exam:   Blood pressure 120/78, pulse (!) 110, temperature 99 °F (37.2 °C), temperature source Oral, resp. rate (!) 54, height 5' 6\" (1.676 m), weight 190 lb (86.2 kg), last menstrual period 06/07/2022, SpO2 90 %. General:    Pale, icteric, moderated distress  CV:   Tachycardia. No m/r/g. S1S2  Lungs:   CTAB. No wheezing, rhonchi, or rales. Tachypnea at rest.  Abdomen: Bowel sounds present. Soft, nondistended. Generalized tenderness to palpation. Extremities: No cyanosis or clubbing. No edema. Skin:     Warm and dry. Jaundice, dark reddish rash to LEs and abdomen. Neuro:  Unable to fully assess due to patient condition, however she will follow commands and move all extremities spontaneously. Psych:  As above.     I have reviewed ordered lab tests and independently visualized imaging below:    Recent Labs:  Recent Results (from the past 48 hour(s))   POC Blood, Cord, Arterial    Collection Time: 06/30/22  1:03 PM   Result Value Ref Range    DEVICE ROOM AIR      FIO2 21 %    pH, Arterial, POC 7.53 (H) 7.35 - 7.45      pCO2, Arterial, POC 29.8 (L) 35 - 45 MMHG    pO2, Arterial, POC 73 (L) 75 - 100 MMHG    HCO3, Mixed 25.1 22 - 26 MMOL/L    SO2c, Arterial, POC 96.3 95 - 98 %    Base Excess 2.5 mmol/L    POC Oliver's Test Positive      Site RIGHT RADIAL      Specimen type: ARTERIAL      Performed by: Molly     POC TCO2 25 (H) 13 - 23 MMOL/L   Lactic Acid    Collection Time: 06/30/22  2:48 PM   Result Value Ref Range    Lactic Acid, Plasma 1.7 0.4 - 2.0 MMOL/L   Ethanol    Collection Time: 06/30/22  4:49 PM   Result Value Ref Range    Ethanol Lvl <3 MG/DL   Culture, Blood 1    Collection Time: 06/30/22  5:10 PM    Specimen: Blood   Result Value Ref Range    Special Requests RIGHT  HAND        Culture NO GROWTH AFTER 13 HOURS     Culture, Urine    Collection Time: 06/30/22  5:28 PM    Specimen: URINE-CLEAN CATCH    URINE   Result Value Ref Range    Special Requests NO SPECIAL REQUESTS      Culture (A)       >100,000 COLONIES/mL GRAM NEGATIVE RODS IDENTIFICATION AND SUSCEPTIBILITY TO FOLLOW   Drug Screen Psych, Urine    Collection Time: 06/30/22  5:28 PM   Result Value Ref Range    Benzodiazepines, Urine Negative      Opiates, Urine Positive      Amphetamine, Urine Negative      Cocaine, Urine Negative      THC, TH-Cannabinol, Urine Positive     Comprehensive Metabolic Panel w/ Reflex to MG    Collection Time: 07/01/22  3:26 AM   Result Value Ref Range    Sodium 148 (H) 136 - 145 mmol/L    Potassium 2.9 (L) 3.5 - 5.1 mmol/L    Chloride 114 (H) 98 - 107 mmol/L    CO2 26 21 - 32 mmol/L    Anion Gap 8 7 - 16 mmol/L    Glucose 113 (H) 65 - 100 mg/dL    BUN 6 6 - 23 MG/DL    CREATININE 0.70 0.6 - 1.0 MG/DL    GFR African American >60 >60 ml/min/1.73m2    GFR Non- >60 >60 ml/min/1.73m2    Calcium 7.2 (L) 8.3 - 10.4 MG/DL    Total Bilirubin 11.0 (H) 0.2 - 1.1 MG/DL    ALT 37 12 - 65 U/L    AST 51 (H) 15 - 37 U/L    Alk Phosphatase 113 50 - 136 U/L    Total Protein 4.5 (L) 6.3 - 8.2 g/dL    Albumin 1.5 (L) 3.5 - 5.0 g/dL    Globulin 3.0 2.3 - 3.5 g/dL    Albumin/Globulin Ratio 0.5 (L) 1.2 - 3.5     CBC with Auto Differential    Collection Time: 07/01/22  3:26 AM   Result Value Ref Range    WBC 18.9 (H) 4.3 - 11.1 K/uL    RBC 2.39 (L) 4.05 - 5.2 M/uL    Hemoglobin 7.6 (L) 11.7 - 15.4 g/dL    Hematocrit 23.7 (L) 35.8 - 46.3 %    MCV 99.2 (H) 79.6 - 97.8 FL    MCH 31.8 26.1 - 32.9 PG    MCHC 32.1 31.4 - 35.0 g/dL    RDW 27.2 (H) 11.9 - 14.6 %    Platelets 784 347 - 793 K/uL    MPV 12.0 9.4 - 12.3 FL    nRBC 0.00 0.0 - 0.2 K/uL    Differential Type AUTOMATED      Seg Neutrophils 66 43 - 78 %    Lymphocytes 26 13 - 44 %    Monocytes 7 4.0 - 12.0 %    Eosinophils % 0 (L) 0.5 - 7.8 %    Basophils 0 0.0 - 2.0 %    Immature Granulocytes 1 0.0 - 5.0 %    Segs Absolute 12.4 (H) 1.7 - 8.2 K/UL    Absolute Lymph # 4.9 (H) 0.5 - 4.6 K/UL    Absolute Mono # 1.4 (H) 0.1 - 1.3 K/UL    Absolute Eos # 0.1 0.0 - 0.8 K/UL    Basophils Absolute 0.0 0.0 - 0.2 K/UL    Absolute Immature Granulocyte 0.2 0.0 - 0.5 K/UL   Ceruloplasmin    Collection Time: 07/01/22  3:26 AM   Result Value Ref Range    Ceruloplasmin 8.8 (L) 19.0 - 39.0 mg/dL   Magnesium    Collection Time: 07/01/22  3:26 AM   Result Value Ref Range    Magnesium 2.0 1.8 - 2.4 mg/dL   Vancomycin Level, Random    Collection Time: 07/01/22  8:45 AM   Result Value Ref Range    Vancomycin Rm 19.3 UG/ML   Respiratory Panel, Molecular, with COVID-19 (Restricted: peds pts or suitable admitted adults)    Collection Time: 07/01/22 10:42 AM    Specimen: Nasopharyngeal   Result Value Ref Range    Adenovirus by PCR NOT DETECTED NOTDET      Coronavirus 229E by PCR NOT DETECTED NOTDET      Coronavirus HKU1 by PCR NOT DETECTED NOTDET      Coronavirus NL63 by PCR NOT DETECTED NOTDET      Coronavirus OC43 by PCR NOT DETECTED NOTDET      SARS-CoV-2, PCR NOT DETECTED NOTDET      Human Metapneumovirus by PCR NOT DETECTED NOTDET      Rhinovirus Enterovirus PCR NOT DETECTED NOTDET      Influenza A by PCR NOT DETECTED NOTDET      INFLUENZA B PCR NOT DETECTED NOTDET      Parainfluenza 1 PCR NOT DETECTED NOTDET      Parainfluenza 2 PCR NOT DETECTED NOTDET      Parainfluenza 3 PCR NOT DETECTED NOTDET      Parainfluenza 4 PCR NOT DETECTED NOTDET      Respiratory Syncytial Virus by PCR NOT DETECTED NOTDET      Bordetella parapertussis by PCR NOT DETECTED NOTDET      Bordetella pertussis by PCR NOT DETECTED NOTDET      Chlamydophila Pneumonia PCR NOT DETECTED NOTDET      Mycoplasma pneumo by PCR NOT DETECTED NOTDET     Transthoracic echocardiogram (TTE) complete with contrast, bubble, strain, and 3D PRN    Collection Time: 07/01/22 11:26 AM   Result Value Ref Range    LA Minor Axis 5.1 cm    LA Major [place] 5.3 cm    LA Area 2C 15.8 cm2    LA Area 4C 17.6 cm2    LA Volume BP 45 22 - 52 mL    LA Diameter 3.7 cm    AV Mean Velocity 1.3 m/s    AV Mean Gradient 7 mmHg    AV VTI 28.3 cm    AV Peak Velocity 1.7 m/s    AV Peak Gradient 12 mmHg    AV Area by VTI 2.9 cm2    AV Area by Peak Velocity 2.6 cm2    Aortic Root 2.9 cm    Ascending Aorta 2.9 cm    EF 3D 62 %    LVOT SV 82.0 ml    LV EDV 3D 154 mL    LV ESV 3D 58 mL    LV Mass 3D 146.0 g    IVSd 1.0 (A) 0.6 - 0.9 cm    LVIDd 4.7 3.9 - 5.3 cm    LVIDs 3.1 cm    LVOT Diameter 2.0 cm    LVOT Mean Gradient 4 mmHg    LVOT VTI 26.1 cm    LVOT Peak Velocity 1.4 m/s    LVOT Peak Gradient 8 mmHg    LVPWd 1.0 (A) 0.6 - 0.9 cm    LV E' Lateral Velocity 15 cm/s    LV E' Septal Velocity 14 cm/s    LVOT Area 3.1 cm2    IVC Proxmal 2.9 cm    MV E Wave Deceleration Time 122.0 ms    MV A Velocity 1.84 m/s    MV E Velocity 1.31 m/s    RV Basal Dimension 3.1 cm    TAPSE 2.6 1.7 cm    TR Max Velocity 2.95 m/s    TR Peak Gradient 35 mmHg    Body Surface Area 2.01 m2    Fractional Shortening 2D 34 28 - 44 %    LV ESV Index 3D 30 mL/m2    LV EDV Index 3D 79 mL/m2    LVIDd Index 2.40 cm/m2    LVIDs Index 1.58 cm/m2    LV RWT Ratio 0.43     LV Mass 2D 164.5 (A) 67 - 162 g    LV Mass 2D Index 83.9 43 - 95 g/m2    LV Mass Index 3D 74.5 g/m2    MV E/A 0.71     E/E' Ratio (Averaged) 9.05     E/E' Lateral 8.73     E/E' Septal 9.36     LA Volume Index BP 23 16 - 34 ml/m2    LVOT Stroke Volume Index 41.8 mL/m2    LA Size Index 1.89 cm/m2    LA/AO Root Ratio 1.28     Ao Root Index 1.48 cm/m2    Ascending Aorta Index 1.48 cm/m2    AV Velocity Ratio 0.82     LVOT:AV VTI Index 0.92     LASHONDA/BSA VTI 1.5 cm2/m2    LASHONDA/BSA Peak Velocity 1.3 cm2/m2    Est. RA Pressure 8 mmHg    RVSP 43 mmHg   DIRECT ANTIGLOBULIN TEST    Collection Time: 07/01/22 11:37 AM   Result Value Ref Range    Polyspecific [place] NEG    T and B Lymph/Natural Killer    Collection Time: 07/01/22 11:42 AM   Result Value Ref Range    Abs CD19 + Lymphs PENDING /uL    % Cd19 + Lymphs PENDING %    CD3 Abs PENDING /uL    % CD3 Pos Lymph PENDING %    Absolute CD 4 [place] PENDING /uL    % CD4 Pos Lymph PENDING %    Abs CD8 Suppressor PENDING /uL    % CD8 Pos Lymph PENDING %    CD4/CD8 Ratio PENDING     Abs NK (CD56/16) PENDING /uL    % NK (CD56/16) PENDING %    WBC 16.5 (H) 3.4 - 10.8 x10E3/uL    RBC 2.48 (L) 3.77 - 5.28 x10E6/uL    Hemoglobin 7.8 (L) 11.1 - 15.9 g/dL    Hematocrit 22.3 (L) 34.0 - 46.6 %    MCV 90 79 - 97 fL    MCH 31.5 26.6 - 33.0 pg    MCHC 35.0 31.5 - 35.7 g/dL    RDW 21.7 (H) 11.7 - 15.4 %    Platelets 564 182 - 930 x10E3/uL    Neutrophils 69 Not Estab. %    Lymphocytes 21 Not Estab. %    Monocytes 8 Not Estab. %    Eosinophils 1 Not Estab. %    Basophils 0 Not Estab. %    Segs Absolute 11.5 (H) 1.4 - 7.0 x10E3/uL    Lymphocytes Absolute 3.4 (H) 0.7 - 3.1 x10E3/uL    Monocytes Absolute 1.3 (H) 0.1 - 0.9 x10E3/uL    Eosinophils Absolute 0.1 0.0 - 0.4 x10E3/uL    Basophils Absolute 0.0 0.0 - 0.2 x10E3/uL    Immature Granulocytes 1 Not Estab. %    IMMATURE GRANULOCYTES ABSOLUTE 0.1 0.0 - 0.1 X10E3/uL   Prothrombin Time Mixing Study    Collection Time: 07/01/22 11:42 AM   Result Value Ref Range    PT mixing study          Protime 19.2 (H) 12.6 - 14.5 sec    INR 1.6      PT 1:1 Mix patient 17.1 sec    PT Incubated PAT 16.7 sec   Mixing Study aPTT    Collection Time: 07/01/22 11:42 AM   Result Value Ref Range    PTT Mixing Studty          PTT 52.0 (H) 23.4 - 34.5 sec    PTT 1:1 Mix 41.4 sec    PTT Incubated Pat 45.9 sec   Potassium    Collection Time: 07/01/22  1:41 PM   Result Value Ref Range    Potassium 3.2 (L) 3.5 - 5.1 mmol/L   PLEASE READ & DOCUMENT PPD TEST IN 24 HRS    Collection Time: 07/01/22  4:07 PM   Result Value Ref Range    PPD, (POC) Negative Negative    mm Induration 0.0 0 - 5 mm   Hepatitis Panel, Acute    Collection Time: 07/01/22  5:42 PM   Result Value Ref Range    Hep A IgM NONREACTIVE NR      Hep B Core Ab, IgM NONREACTIVE NR      Hepatitis B Surface Ag NONREACTIVE NR      Hepatitis C Ab NONREACTIVE NR     CBC with Auto Differential    Collection Time: 07/02/22  3:08 AM   Result Value Ref Range WBC 16.1 (H) 4.3 - 11.1 K/uL    RBC 2.19 (L) 4.05 - 5.2 M/uL    Hemoglobin 7.0 (L) 11.7 - 15.4 g/dL    Hematocrit 21.9 (L) 35.8 - 46.3 %    .0 (H) 79.6 - 97.8 FL    MCH 32.0 26.1 - 32.9 PG    MCHC 32.0 31.4 - 35.0 g/dL    RDW 26.8 (H) 11.9 - 14.6 %    Platelets 485 559 - 017 K/uL    MPV 11.8 9.4 - 12.3 FL    nRBC 0.00 0.0 - 0.2 K/uL    Differential Type AUTOMATED      Seg Neutrophils 61 43 - 78 %    Lymphocytes 30 13 - 44 %    Monocytes 7 4.0 - 12.0 %    Eosinophils % 1 0.5 - 7.8 %    Basophils 0 0.0 - 2.0 %    Immature Granulocytes 1 0.0 - 5.0 %    Segs Absolute 9.7 (H) 1.7 - 8.2 K/UL    Absolute Lymph # 4.7 (H) 0.5 - 4.6 K/UL    Absolute Mono # 1.2 0.1 - 1.3 K/UL    Absolute Eos # 0.2 0.0 - 0.8 K/UL    Basophils Absolute 0.0 0.0 - 0.2 K/UL    Absolute Immature Granulocyte 0.2 0.0 - 0.5 K/UL   Comprehensive Metabolic Panel w/ Reflex to MG    Collection Time: 07/02/22  3:08 AM   Result Value Ref Range    Sodium 145 136 - 145 mmol/L    Potassium 3.4 (L) 3.5 - 5.1 mmol/L    Chloride 114 (H) 98 - 107 mmol/L    CO2 27 21 - 32 mmol/L    Anion Gap 4 (L) 7 - 16 mmol/L    Glucose 105 (H) 65 - 100 mg/dL    BUN 6 6 - 23 MG/DL    CREATININE 0.50 (L) 0.6 - 1.0 MG/DL    GFR African American >60 >60 ml/min/1.73m2    GFR Non- >60 >60 ml/min/1.73m2    Calcium 7.3 (L) 8.3 - 10.4 MG/DL    Total Bilirubin 12.1 (H) 0.2 - 1.1 MG/DL    ALT 39 12 - 65 U/L    AST 46 (H) 15 - 37 U/L    Alk Phosphatase 106 50 - 136 U/L    Total Protein 4.4 (L) 6.3 - 8.2 g/dL    Albumin 1.5 (L) 3.5 - 5.0 g/dL    Globulin 2.9 2.3 - 3.5 g/dL    Albumin/Globulin Ratio 0.5 (L) 1.2 - 3.5     Ammonia    Collection Time: 07/02/22  3:08 AM   Result Value Ref Range    Ammonia 131 (H) 11 - 32 UMOL/L   Magnesium    Collection Time: 07/02/22  3:08 AM   Result Value Ref Range    Magnesium 2.0 1.8 - 2.4 mg/dL   Transesophageal echocardiogram (NE) with contrast and 3D PRN    Collection Time: 07/02/22 10:39 AM   Result Value Ref Range    Body Surface Area 2 m2         Other Studies:  CT HEAD WO CONTRAST    Result Date: 6/30/2022  HEAD CT WITHOUT CONTRAST  6/30/2022 HISTORY:   AMS  patient was found down at home today after receiving a blood transfusion. Lethargy. TECHNIQUE: Noncontrast axial images were obtained through the brain. All CT scans at this facility used dose modulation, interactive reconstruction and/or weight based dosing when appropriate to reduce radiation dose to as low as reasonably achievable. COMPARISON: June 2, 2022 FINDINGS: There is no acute intracranial hemorrhage, significant mass effect or CT evidence of acute large artery territorial infarction. Please note that a hyperacute infarct or small vessel infarct may not be apparent on initial CT imaging. There is no hydrocephalus , intra-axial mass or abnormal extra-axial fluid collection. There are no displaced skull fractures. The mastoid air cells and paranasal sinuses are clear where imaged. No acute findings. CT CERVICAL SPINE WO CONTRAST    Result Date: 6/30/2022  CT CERVICAL SPINE WITHOUT CONTRAST HISTORY:  neck pain; patient was found down today after a blood transfusion. Lethargy. TECHNIQUE: Noncontrast axial images were obtained from the craniocervical junction through T3-T4. Multiplanar reformatted images were generated. All CT scans at this facility used dose modulation, iterative reconstruction and/or weight based dosing when appropriate to reduce radiation dose to as low as reasonably achievable. COMPARISON:  None FINDINGS:  The cervical vertebrae are normal in height and alignment. There is no prevertebral soft tissue swelling. The articular facets overlap appropriately. Axial images demonstrate no displaced cervical spine fracture. Included portions of the lungs demonstrate diffuse upper lobe consolidation. 1. No acute findings in the cervical spine. 2. Diffuse bilateral upper lobe consolidation.     XR CHEST PORTABLE    Result Date: 6/30/2022  PORTABLE CHEST 1 VIEW HISTORY: Central line placement COMPARISON: One hour prior FINDINGS: A new right IJ catheter terminates in the right atrium. The lung volumes are diminished. There is no visible pneumothorax. Bilateral upper lobe predominant consolidation is present. EKG leads are present. 1. Placement of right IJ catheter without visible pneumothorax. 2. Bilateral consolidation. XR CHEST PORTABLE    Result Date: 6/30/2022  PORTABLE CHEST 1 VIEW HISTORY: Altered level of consciousness COMPARISON: June 2, 2022 FINDINGS: Consolidation is present in the periphery of the right upper lung and to lesser extent left upper lobe. Lung volumes are diminished. EKG leads are present. Low lung volumes with bilateral consolidation. CT CHEST ABDOMEN PELVIS W CONTRAST    Result Date: 6/30/2022  CT ABDOMEN AND PELVIS WITH CONTRAST 6/30/2022 1:51 PM HISTORY:  Patient was found down today after receiving blood transfusion. Lethargy. Jaundice. Abdominal discomfort; TECHNIQUE: The patient received 100 mL Isovue-370 nonionic IV contrast. Axial images were obtained through the chest, abdomen and pelvis. Coronal reformatted images were generated. All CT scans at this facility used dose modulation, interactive reconstruction and/or weight based dosing when appropriate to reduce radiation dose to as low as reasonably achievable. COMPARISON: None available FINDINGS: CHEST: A right IJ catheter extends into the bottom of the right atrium. There is no thoracic adenopathy. Consolidation is present throughout both upper lobes and within the lingula. Pleural spaces are clear. There is no pneumothorax. ABDOMEN: Gastric bypass. Gallbladder: Cholecystectomy. Liver: Diffuse hepatic steatosis. No focal hepatic lesions and no intrahepatic biliary ductal dilatation. Pancreas: Normal. Spleen: Normal. Adrenal glands: Normal. Kidneys: The kidneys enhance symmetrically with contrast and there is no hydronephrosis. Bowel:  The appendix is not well demonstrated. There is diffuse colon wall thickening. Retroperitoneum:No adenopathy. Abdominal aorta is normal in caliber. PELVIS:The bladder is normal in appearance. There is no free pelvic fluid. The uterus is present. Bones: There are no aggressive osseous lesions. 1. Bilateral upper lobe consolidation. 2. Hepatic steatosis. 3. Pan colitis.         Current Meds:  Current Facility-Administered Medications   Medication Dose Route Frequency    potassium chloride (KLOR-CON M) extended release tablet 40 mEq  40 mEq Oral PRN    Or    potassium bicarb-citric acid (EFFER-K) effervescent tablet 40 mEq  40 mEq Oral PRN    Or    potassium chloride 10 mEq/100 mL IVPB (Peripheral Line)  10 mEq IntraVENous PRN    magnesium sulfate 2000 mg in 50 mL IVPB premix  2,000 mg IntraVENous PRN    dextrose 5 % with KCl 20 mEq infusion   IntraVENous Continuous    lactulose (CHRONULAC) 10 GM/15ML solution 20 g  20 g Oral 4x Daily    LORazepam (ATIVAN) tablet 0.5 mg  0.5 mg Oral Q6H PRN    nystatin (MYCOSTATIN) ointment   Topical BID PRN    zinc oxide (DESITIN) 40 % ointment   Topical 4x Daily PRN    sodium chloride flush 0.9 % injection 5-40 mL  5-40 mL IntraVENous 2 times per day    sodium chloride flush 0.9 % injection 5-40 mL  5-40 mL IntraVENous PRN    0.9 % sodium chloride infusion   IntraVENous PRN    ondansetron (ZOFRAN-ODT) disintegrating tablet 4 mg  4 mg Oral Q8H PRN    Or    ondansetron (ZOFRAN) injection 4 mg  4 mg IntraVENous Q6H PRN    polyethylene glycol (GLYCOLAX) packet 17 g  17 g Oral Daily PRN    nicotine (NICODERM CQ) 14 MG/24HR 1 patch  1 patch TransDERmal Daily    levalbuterol (XOPENEX) nebulizer solution 1.25 mg  1.25 mg Nebulization Q8H PRN    morphine injection 2 mg  2 mg IntraVENous Q3H PRN       Signed:  Angela Agudelo MD

## 2022-07-02 NOTE — PROGRESS NOTES
TRANSFER - OUT REPORT:    Verbal report given to RN(name) on [facility] being transferred to 5th floor(unit) for routine progression of patient care       Report consisted of patient's Situation, Background, Assessment and   Recommendations(SBAR). Information from the following report(s) Nurse Handoff Report was reviewed with the receiving nurse. Opportunity for questions and clarification was provided.       Patient transported with:   Monitor     NE w/ Dr. Sana Gillis  5 mg versed  75 mcg fentanyl  Cannot rule out vegetation

## 2022-07-02 NOTE — PROGRESS NOTES
LTG: Patient will tolerate least restrictive oral diet without overt s/sx of airway compromise. STG: Patient will tolerated minced and moist with thin liquids with no straws without overt s/sx of airway compromise. STG Pt will participate in a Modified barium swallow study if indicated  STG Pt will participate in a full ST evaluation if indicated    SPEECH LANGUAGE PATHOLOGY: DYSPHAGIA  Initial Assessment    NAME: Gabriela Sauer  : 1990  MRN: [id number]    ADMISSION DATE: 2022  ADMITTING DIAGNOSIS: has Status post gastric bypass for obesity; Essential hypertension; Hypoalbuminemia; Normocytic anemia; Bilateral leg edema; Elevated bilirubin; Acute hypokalemia; Macrocytic anemia; Severe sepsis ([address] HCAP (healthcare-associated pneumonia); UTI (urinary tract infection); Colitis; Hemolytic anemia ([address] Low serum copper for age; Hypernatremia; Hyperbilirubinemia; Transaminitis; and Hyperammonemia ([address] on their problem list.    ICD-10: Treatment Diagnosis: R13.12 Dysphagia, Oropharyngeal Phase    RECOMMENDATIONS   Diet:  Diet Solids Recommendation: Minced & Moist  Liquid Consistency Recommendation: Thin    Medications: Crushed in puree as able     Recommendations: Dysphagia treatment; Modified barium swallow study; Setup assist;Supervision with meals;Assistance with meals   Compensatory Swallowing Strategies:Small bites/sips;Upright as possible for all oral intake;Assist feed; No straws; Set up assist;Eat/Feed slowly; Remain upright for 30-45 minutes after meals   Therapeutic Intervention:Oral care;Diet tolerance monitoring;Oral motor exercises; Laryngeal exercises; Pharyngeal exercises; Patient/Family education   Patient continues to require skilled intervention: Yes  D/C Recommendations: Ongoing speech therapy is recommended during this hospitalization     ASSESSMENT    Dysphagia Diagnosis: Mild to moderate oral stage dysphagia;Mild oral stage dysphagia  Dysphagia Impression : Pt seen with liquid medication via nurse with noted decreased rate. Pt with dysarthria like speech. Pt with positive signs. sx of aspiration on thin liquids via straw; weak cough. Pt tolerated pureed and mixed with noted slow oral prep. Timely swallow. Minmal oral residue. Solid deferred at this time. Recommend minced and moist with thin liquids with medication crushed/whole in pureed. Pt needs to be fully awake and alert and fully upright for all meals. Will follow for dysphagia management, dysarthria assessment and treatment and fully ST evaluation. GENERAL    History of Present Injury/Illness: Ms. Carrie Sorensen  has a past medical history of Anxiety, Bilateral leg edema, History of blood transfusion, Hypertension, and Interstitial cystitis. . She also  has a past surgical history that includes Gastric bypass surgery (2010); Cholecystectomy (2012); Tonsillectomy; and Wrist surgery. Chart Reviewed: Yes  Behavior/Cognition: Alert  Patient Position: upright in bed  Communication Observation: Dysarthria  Follows Directions: Simple                 Prior Dysphagia History: coughing     Current Diet : NPO  Current Liquid Diet : NPO  Pain:   Patient c/o pain                                        OBJECTIVE    Patient Positioning: Upright in bed   Oral Motor   Labial: Decreased rate  Dentition: Upper dentures; Lower dentures  Oral Hygiene: Dried secretions  Oral Hygiene Comments: provided oral care  Lingual: Decreased rate  Dentition: Dentures top;Dentures bottom     Volitional Cough: Weak  Baseline Vocal Quality: Weak  Oropharyngeal Phase:  Mixed Consistencies; Thin - cup; Thin - straw;Pureed  Neuromuscular Estim Used: No  Assessment Method(s): Observation  Patient Position: upright in bed  Vocal Quality: Fatigue;Breathy  Consistency Presented: Pureed;Mixed consistency; Thin  How Presented: SLP-fed/Presented;Spoon;Straw;Cup/sip  Bolus Acceptance: No impairment  Bolus Formation/Control: Impaired  Type of Impairment: Mastication  Propulsion: No impairment  Oral Residue: Less than 10% of bolus  Initiation of Swallow: No impairment  Laryngeal Elevation: Decreased  Aspiration Signs/Symptoms: Delayed cough/throat clear;Weak cough  Pharyngeal Phase Characteristics: Easily fatigued  Effective Modifications: Cup/sip;Small sips and bites        Oral Phase - Comment: mild  Pharyngeal Phase: mild  PLAN    Duration/Frequency: Continue to follow patient 3x/week for duration of hospitalization and/or until goals met    Dysphagia Outcome and Severity Scale (KAYLA)  Dysphagia Outcome Severity Scale: Level 4: Mild moderate dysphagia- Intermittent supervision/cueing. One - two diet consistencies restricted  Interpretation of Tool: The Dysphagia Outcome and Severity Scale (KAYLA) is a simple, easy-to-use, 7-point scale developed to systematically rate the functional severity of dysphagia based on objective assessment and make recommendations for diet level, independence level, and type of nutrition. Normal(7), Functional(6), Mild(5), Mild-Moderate(4), Moderate(3), Moderate-Severe(2), Severe(1)    Speech Therapy Prognosis  Prognosis: Good    Education: Sabrina Knight  Patient Education: aspiration risk, positioning, dysphgia  Patient Education Response: Verbalizes understanding    Current Medications:   No current facility-administered medications on file prior to encounter.      Current Outpatient Medications on File Prior to Encounter   Medication Sig Dispense Refill    Fe Fum-FePoly-FA-Vit C-Vit B3 (INTEGRA F PO) Take by mouth      Potassium Bicarbonate (EFFER-K PO) Take by mouth      Multiple Vitamins-Minerals (THERAPEUTIC MULTIVITAMIN-MINERALS) tablet Take 1 tablet by mouth daily (Patient not taking: Reported on 6/28/2022)      furosemide (LASIX) 20 MG tablet Take 1 tablet by mouth daily 30 tablet 0    DULoxetine (CYMBALTA) 30 MG extended release capsule Take 1 capsule by mouth daily 90 capsule 1    metoprolol tartrate (LOPRESSOR) 25 MG tablet Take 1 tablet by mouth 2 times daily 180 tablet 1       PRECAUTIONS/ALLERGIES: Lamictal [lamotrigine]   Safety Devices in place: Yes  Type of devices: Call light within reach    Therapy Time  SLP Individual Minutes  Time In: 7224  Time Out: 2313  Minutes: [address]  7/2/2022 9:24 AM

## 2022-07-02 NOTE — DISCHARGE SUMMARY
[unfilled]    Physician Discharge Summary     Patient ID:  Malia Mirza  [id number]  89 y.o.  1990    Admit date: 6/30/2022    Discharge date: 7-2-2022    Diagnosis:    See Records for details:    1- Leukocytosis + RR >20 + acute encephalopathy. No source of infection. Findings:              - Initially suspected pulmonary source. CT c/a/p showed b/l upper lobe consolidations, pan-colitis and hepatic steatosis. Resp viral swab is negative. ID was consulted and antibiotics stopped. - Blood cultures are negative so far. TTE noted a \"possible vegetation in parasternal long axis view\". NE negative on 7-2-22. Has history of marantic endocarditis. 2- Hyperbilirubinemia and elevated INR 1.6. In acute liver failure. 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 3- Hepatic encephalopathy with  Hyperammonemia. Unremarkable CT head and cervical spines. Treated with lactulose orally. 4- Hypoalbuminemia with bilateral lower limbs edema. 5- Hypokalemia, replaced. 6- Hypernatremia, mild, improved. 7- History of hypertension and macrocytic anemia, to follow. Stable so far. Transfer to [address] under Hepatologist Dr Pastor Wright, has been accepted for liver transplant evaluation. US abdomen: The liver is enlarged at 22.2 cm. There is diffuse increase in   echogenicity. No focal lesions evident. The patient is status post   cholecystectomy. The common bile duct measures 5.4 mm. The imaged portion the   pancreas is unremarkable. The right kidney is normal in size measuring 11 cm. There is no hydronephrosis. Echogenicity is normal. The aorta and IVC are patent   and unremarkable. Impression:  Hepatomegaly with diffuse fatty infiltration. CT abdomen and pelvis:  1. Bilateral upper lobe consolidation. 2. Hepatic steatosis. 3. Pan colitis. Hospital course:   Per Gi: 36 y.o. female with PMH gastric bypass who is seen for AMS, jaundice, liver failure. Bili is 12. Ceruloplasmin came back low at 8.8. Echo showed possible aortic valve vegetation. She has nonimmune hemolytic anemia. Has required blood transfusions. She has a weird bullous rash. AST/ ALT 39-40, AlkP 106. Etoh and tylenol negative. Viral hep negative. INR 1.6.  US showed fatty liver. CT showed colitis, but not having diarrhea. WBC 15-17 range. Patient admitted and treated as above. On day of discharge, patient exam showed icterus, patient looking ill but oriented. Mother and father at bedside, updated about condition, today's resulted and transfer plan to [address]. PCP: Richard Huff, DO    Time 35 min  Please send copy to primary physician.     Signed:  Nithin Tadeo MD  7/2/2022  2:03 PM

## 2022-07-02 NOTE — PROGRESS NOTES
Date of Outreach Update:  Elizabeth Cortez was seen and assessed. Previous Outreach assessment has been reviewed. There have been no significant clinical changes since the completion of the last dated Outreach assessment. Will continue to follow up per outreach protocol.     Signed By:   Nina Burleson RN    July 2, 2022 2:30 AM

## 2022-07-02 NOTE — PROGRESS NOTES
TRANSFER - IN REPORT:    Verbal report received from RN(name) on [employer]  being received from 5th floor(unit) for routine progression of patient care      Report consisted of patients Situation, Background, Assessment and   Recommendations(SBAR). Information from the following report(s) Nurse Handoff Report was reviewed with the receiving nurse. Opportunity for questions and clarification was provided.       Assessment completed upon patients arrival to unit and care assume

## 2022-07-03 LAB
BACTERIA SPEC CULT: ABNORMAL
BACTERIA SPEC CULT: ABNORMAL
CERULOPLASMIN SERPL-MCNC: 9.5 MG/DL (ref 19–39)
CMV IGG SERPL IA-ACNC: 9 U/ML (ref 0–0.59)
SERVICE CMNT-IMP: ABNORMAL
VIT C SERPL-MCNC: 0.2 MG/DL (ref 0.4–2)

## 2022-07-04 LAB
RPR SER QL: NON REACTIVE
SMA IGG SER-ACNC: 10 UNITS (ref 0–19)
SMA IGG SER-ACNC: 12 UNITS (ref 0–19)

## 2022-07-05 LAB
ANA SER QL: NEGATIVE
BACTERIA SPEC CULT: NORMAL
BACTERIA SPEC CULT: NORMAL
C BURNET PH1 IGG SER-ACNC: NEGATIVE
C BURNET PH2 IGG SER-ACNC: NEGATIVE
HLA-B27 QL NAA+PROBE: NEGATIVE
MITOCHONDRIA M2 IGG SER-ACNC: <20 UNITS (ref 0–20)
PATH REV BLD -IMP: NORMAL
SERVICE CMNT-IMP: NORMAL
SERVICE CMNT-IMP: NORMAL

## 2022-07-06 LAB
B HENSELAE IGG TITR SER IF: NEGATIVE TITER
B HENSELAE IGM TITR SER IF: NEGATIVE TITER
B QUINTANA IGG TITR SER: NEGATIVE TITER
B QUINTANA IGM TITR SER: NEGATIVE TITER
Lab: NORMAL
Lab: NORMAL
REFERENCE LAB: NORMAL

## 2022-07-07 LAB — TTG IGA SER-ACNC: 8 U/ML (ref 0–3)

## 2022-07-08 LAB
BASOPHILS # BLD AUTO: 0 X10E3/UL (ref 0–0.2)
BASOPHILS NFR BLD AUTO: 0 %
CD19 CELLS # BLD: 411 /UL (ref 12–645)
CD19 CELLS NFR BLD: 12.1 % (ref 3.3–25.4)
CD3 CELLS # BLD: 2693 /UL (ref 622–2402)
CD3 CELLS NFR BLD: 79.2 % (ref 57.5–86.2)
CD3+CD16+CD56+ CELLS # BLD: 258 /UL (ref 24–406)
CD3+CD16+CD56+ CELLS NFR BLD: 7.6 % (ref 1.4–19.4)
CD3+CD4+ CELLS # BLD: 1999 /UL (ref 359–1519)
CD3+CD4+ CELLS NFR BLD: 58.8 % (ref 30.8–58.5)
CD3+CD4+ CELLS/CD3+CD8+ CLL BLD: 3.09 % (ref 0.92–3.72)
CD3+CD8+ CELLS # BLD: 646 /UL (ref 109–897)
CD3+CD8+ CELLS NFR BLD: 19 % (ref 12–35.5)
DRVVT RATIO: 1.1 RATIO (ref 0.8–1.2)
EOSINOPHIL # BLD AUTO: 0.1 X10E3/UL (ref 0–0.4)
EOSINOPHIL NFR BLD AUTO: 1 %
ERYTHROCYTE [DISTWIDTH] IN BLOOD BY AUTOMATED COUNT: 21.7 % (ref 11.7–15.4)
FACT VII ACT/NOR PPP: 42 % (ref 51–186)
FLOW CYTOMETRY RESULTS: NORMAL
HCT VFR BLD AUTO: 22.3 % (ref 34–46.6)
HEXAGONAL PHASE PHOSPHOLIPID: 5 SEC (ref 0–11)
HGB BLD-MCNC: 7.8 G/DL (ref 11.1–15.9)
IMM GRANULOCYTES # BLD: 0.1 X10E3/UL (ref 0–0.1)
IMM GRANULOCYTES NFR BLD: 1 %
LYMPHOCYTES # BLD AUTO: 3.4 X10E3/UL (ref 0.7–3.1)
LYMPHOCYTES NFR BLD AUTO: 21 %
MCH RBC QN AUTO: 31.5 PG (ref 26.6–33)
MCHC RBC AUTO-ENTMCNC: 35 G/DL (ref 31.5–35.7)
MCV RBC AUTO: 90 FL (ref 79–97)
MIXING DRVVT: 45.7 SEC (ref 0–40.4)
MONOCYTES # BLD AUTO: 1.3 X10E3/UL (ref 0.1–0.9)
MONOCYTES NFR BLD AUTO: 8 %
NEUTROPHILS # BLD AUTO: 11.5 X10E3/UL (ref 1.4–7)
NEUTROPHILS NFR BLD AUTO: 69 %
PLATELET # BLD AUTO: 211 X10E3/UL (ref 150–450)
PTT-LA MIX: 56.6 SEC (ref 0–48.9)
RBC # BLD AUTO: 2.48 X10E6/UL (ref 3.77–5.28)
SCREEN APTT: 67.8 SEC (ref 0–51.9)
SCREEN DRVVT: 72.4 SEC (ref 0–47)
SPECIMEN SOURCE: NORMAL
TEST ORDERED: NORMAL
VIT C SERPL-MCNC: 0.2 MG/DL (ref 0.4–2)
WBC # BLD AUTO: 16.5 X10E3/UL (ref 3.4–10.8)

## 2022-07-30 PROBLEM — N39.0 UTI (URINARY TRACT INFECTION): Status: RESOLVED | Noted: 2022-06-30 | Resolved: 2022-07-30

## 2022-08-31 NOTE — ED NOTES
[facility] Provider Note       This is a late entry addendum to the ED note from date 6/2/22  Please add the below information to the original ED note. Past Medical History:   Diagnosis Date    Anxiety     Bilateral leg edema 2020    if she doesn't eat enough protein    History of blood transfusion 06/02/2022    Hypertension 02/09/2021    Interstitial cystitis 2017        Past Surgical History:   Procedure Laterality Date    CHOLECYSTECTOMY  2012    GASTRIC BYPASS SURGERY  2010    was 283 lbs    TONSILLECTOMY      WRIST SURGERY          Family History   Problem Relation Age of Onset    Other Father         doesn't have contact with    Ovarian Cancer Mother 39    Other Mother         fibromyalgia    Rheum Arthritis Mother         Social History     Socioeconomic History    Marital status: Single     Spouse name: None    Number of children: None    Years of education: None    Highest education level: None   Tobacco Use    Smoking status: Every Day     Packs/day: 0.50     Types: Cigarettes    Smokeless tobacco: Never    Tobacco comments:     Quit smoking: started age 14   Substance and Sexual Activity    Alcohol use: Not Currently    Drug use: Not Currently         Voice dictation software was used during the making of this note. This software is not perfect and grammatical and other typographical errors may be present. This note has not been completely proofread for errors.          Janece Hamman, [place]  08/31/22 [id number]

## 2022-10-23 ENCOUNTER — HOME HEALTH ADMISSION (OUTPATIENT)
Dept: HOME HEALTH SERVICES | Facility: HOME HEALTH | Age: 32
End: 2022-10-23